# Patient Record
Sex: FEMALE | Race: WHITE | NOT HISPANIC OR LATINO | Employment: OTHER | URBAN - METROPOLITAN AREA
[De-identification: names, ages, dates, MRNs, and addresses within clinical notes are randomized per-mention and may not be internally consistent; named-entity substitution may affect disease eponyms.]

---

## 2017-01-26 RX ORDER — MELOXICAM 15 MG/1
TABLET ORAL
Refills: 0 | OUTPATIENT
Start: 2017-01-26

## 2017-03-19 RX ORDER — MELOXICAM 15 MG/1
TABLET ORAL
Qty: 30 TABLET | Refills: 0 | OUTPATIENT
Start: 2017-03-19

## 2017-03-20 DIAGNOSIS — M17.11 PRIMARY OSTEOARTHRITIS OF RIGHT KNEE: Primary | ICD-10-CM

## 2017-03-20 RX ORDER — MELOXICAM 15 MG/1
15 TABLET ORAL DAILY
Qty: 30 TABLET | Refills: 3 | Status: SHIPPED | OUTPATIENT
Start: 2017-03-20 | End: 2017-05-01

## 2017-05-01 ENCOUNTER — PATIENT MESSAGE (OUTPATIENT)
Dept: ORTHOPEDICS | Facility: CLINIC | Age: 72
End: 2017-05-01

## 2017-05-01 RX ORDER — MELOXICAM 15 MG/1
15 TABLET ORAL DAILY
Qty: 30 TABLET | Refills: 2 | Status: SHIPPED | OUTPATIENT
Start: 2017-05-01 | End: 2017-05-31

## 2017-05-16 ENCOUNTER — PATIENT MESSAGE (OUTPATIENT)
Dept: INTERNAL MEDICINE | Facility: CLINIC | Age: 72
End: 2017-05-16

## 2017-06-08 ENCOUNTER — OFFICE VISIT (OUTPATIENT)
Dept: INTERNAL MEDICINE | Facility: CLINIC | Age: 72
End: 2017-06-08
Payer: MEDICARE

## 2017-06-08 VITALS
WEIGHT: 179 LBS | SYSTOLIC BLOOD PRESSURE: 112 MMHG | BODY MASS INDEX: 25.62 KG/M2 | HEIGHT: 70 IN | HEART RATE: 67 BPM | DIASTOLIC BLOOD PRESSURE: 82 MMHG | OXYGEN SATURATION: 97 %

## 2017-06-08 DIAGNOSIS — M17.11 PRIMARY OSTEOARTHRITIS OF RIGHT KNEE: ICD-10-CM

## 2017-06-08 DIAGNOSIS — B35.1 ONYCHOMYCOSIS: Primary | ICD-10-CM

## 2017-06-08 DIAGNOSIS — R20.0 NUMBNESS OF FOOT: ICD-10-CM

## 2017-06-08 PROCEDURE — 1126F AMNT PAIN NOTED NONE PRSNT: CPT | Mod: S$GLB,,, | Performed by: INTERNAL MEDICINE

## 2017-06-08 PROCEDURE — 1159F MED LIST DOCD IN RCRD: CPT | Mod: S$GLB,,, | Performed by: INTERNAL MEDICINE

## 2017-06-08 PROCEDURE — 99214 OFFICE O/P EST MOD 30 MIN: CPT | Mod: S$GLB,,, | Performed by: INTERNAL MEDICINE

## 2017-06-08 PROCEDURE — 99999 PR PBB SHADOW E&M-EST. PATIENT-LVL III: CPT | Mod: PBBFAC,,, | Performed by: INTERNAL MEDICINE

## 2017-06-08 RX ORDER — TERBINAFINE HYDROCHLORIDE 250 MG/1
TABLET ORAL
Qty: 30 TABLET | Refills: 1 | Status: SHIPPED | OUTPATIENT
Start: 2017-06-08 | End: 2017-12-13

## 2017-06-08 NOTE — PROGRESS NOTES
Subjective:       Patient ID: Aurora Castle is a 71 y.o. female.    Chief Complaint: Hyperglycemia (109 glucose) and Nail Problem    HPI   Had wellness check at work.  130/78.  Bone density nl.    Fasting sugar 109 and she was advised to see a doctor immediately.   Her mother had pre-diabetes.    Walking less due to knee pain.  She has beenless careful with diet lately and BMI 25.6.    Slipped on steps and twisted R knee late april.  Since then r ankle swollen and foot numb.  Knee was swollen for a while.  She has had fluid aspirated and synvisc injection in past.  Taking meloxicam.  Knee much improved.  Intends to see ortho later for synvisc.      Also with L thumb oychomycosis.  Had similar infection of toenail and antibiotics helps.  She is a jeweler.  Review of Systems   Constitutional: Negative for fever and unexpected weight change.   HENT: Negative for congestion and postnasal drip.    Eyes: Negative for pain, discharge and visual disturbance.   Respiratory: Negative for cough, chest tightness, shortness of breath and wheezing.    Cardiovascular: Negative for chest pain and leg swelling.   Gastrointestinal: Negative for abdominal pain, constipation, diarrhea and nausea.   Genitourinary: Negative for difficulty urinating, dysuria and hematuria.   Musculoskeletal: Positive for arthralgias and joint swelling. Negative for back pain and gait problem.   Skin: Negative for rash.   Neurological: Positive for numbness. Negative for headaches.   Psychiatric/Behavioral: Negative for dysphoric mood and sleep disturbance. The patient is not nervous/anxious.        Objective:      Physical Exam   Constitutional: She is oriented to person, place, and time. She appears well-developed and well-nourished.   Musculoskeletal: She exhibits edema (minimal , of R ankle).        Right knee: She exhibits effusion (mild). She exhibits normal range of motion, no ecchymosis, no deformity and no erythema.        Right ankle: She  exhibits swelling (minimal). She exhibits no ecchymosis and no deformity. No tenderness.   Neurological: She is alert and oriented to person, place, and time.   Skin: No rash noted. She is not diaphoretic. No erythema.   White discoloration L medial thumbnail.   Psychiatric: She has a normal mood and affect. Her behavior is normal.       Assessment:       1. Onychomycosis    2. Primary osteoarthritis of right knee    3. Numbness of foot     4.     Isolated prediabetes glucose reading.  Plan:       Aurora was seen today for hyperglycemia and nail problem.    Diagnoses and all orders for this visit:    Onychomycosis  -     AST (SGOT); Future  -     ALT (SGPT); Future    Primary osteoarthritis of right knee    Numbness of foot    Other orders  -     terbinafine HCl (LAMISIL) 250 mg tablet; 1 tab po q day for 6 weeks       Wt loss     rtc PE with labs.     We discussed EMG if foot numbness doesn't resolve       F/u ortho

## 2017-06-22 ENCOUNTER — TELEPHONE (OUTPATIENT)
Dept: INTERNAL MEDICINE | Facility: CLINIC | Age: 72
End: 2017-06-22

## 2017-06-22 DIAGNOSIS — Z12.31 ENCOUNTER FOR SCREENING MAMMOGRAM FOR MALIGNANT NEOPLASM OF BREAST: Primary | ICD-10-CM

## 2017-06-22 NOTE — TELEPHONE ENCOUNTER
----- Message from Liyah Bui sent at 6/22/2017 12:20 PM CDT -----  Contact: pt   Pt is requesting orders for mammogram.    Pt can be reached at [phone].

## 2017-06-28 ENCOUNTER — HOSPITAL ENCOUNTER (OUTPATIENT)
Dept: RADIOLOGY | Facility: HOSPITAL | Age: 72
Discharge: HOME | End: 2017-06-28
Attending: INTERNAL MEDICINE
Payer: MEDICARE

## 2017-06-28 VITALS — WEIGHT: 170 LBS | BODY MASS INDEX: 24.34 KG/M2 | HEIGHT: 70 IN

## 2017-06-28 DIAGNOSIS — Z12.31 ENCOUNTER FOR SCREENING MAMMOGRAM FOR MALIGNANT NEOPLASM OF BREAST: ICD-10-CM

## 2017-06-28 PROCEDURE — 77067 SCR MAMMO BI INCL CAD: CPT | Mod: TC

## 2017-06-28 PROCEDURE — 77067 SCR MAMMO BI INCL CAD: CPT | Mod: 26,,, | Performed by: RADIOLOGY

## 2017-07-07 ENCOUNTER — LAB VISIT (OUTPATIENT)
Dept: LAB | Facility: HOSPITAL | Age: 72
End: 2017-07-07
Attending: INTERNAL MEDICINE
Payer: MEDICARE

## 2017-07-07 DIAGNOSIS — B35.1 ONYCHOMYCOSIS: ICD-10-CM

## 2017-07-07 LAB
ALT SERPL W/O P-5'-P-CCNC: 20 U/L
AST SERPL-CCNC: 20 U/L

## 2017-07-07 PROCEDURE — 84460 ALANINE AMINO (ALT) (SGPT): CPT

## 2017-07-07 PROCEDURE — 84450 TRANSFERASE (AST) (SGOT): CPT

## 2017-07-07 PROCEDURE — 36415 COLL VENOUS BLD VENIPUNCTURE: CPT

## 2017-07-10 RX ORDER — MELOXICAM 15 MG/1
TABLET ORAL
Qty: 90 TABLET | Refills: 2 | Status: SHIPPED | OUTPATIENT
Start: 2017-07-10 | End: 2018-09-06

## 2017-08-17 ENCOUNTER — OFFICE VISIT (OUTPATIENT)
Dept: ORTHOPEDICS | Facility: CLINIC | Age: 72
End: 2017-08-17
Payer: MEDICARE

## 2017-08-17 VITALS
DIASTOLIC BLOOD PRESSURE: 70 MMHG | WEIGHT: 170 LBS | HEIGHT: 70 IN | BODY MASS INDEX: 24.34 KG/M2 | HEART RATE: 69 BPM | SYSTOLIC BLOOD PRESSURE: 139 MMHG

## 2017-08-17 DIAGNOSIS — M17.11 PRIMARY OSTEOARTHRITIS OF RIGHT KNEE: Primary | ICD-10-CM

## 2017-08-17 PROCEDURE — 1159F MED LIST DOCD IN RCRD: CPT | Mod: S$GLB,,, | Performed by: PHYSICIAN ASSISTANT

## 2017-08-17 PROCEDURE — 99213 OFFICE O/P EST LOW 20 MIN: CPT | Mod: 25,S$GLB,, | Performed by: PHYSICIAN ASSISTANT

## 2017-08-17 PROCEDURE — 3008F BODY MASS INDEX DOCD: CPT | Mod: S$GLB,,, | Performed by: PHYSICIAN ASSISTANT

## 2017-08-17 PROCEDURE — 99999 PR PBB SHADOW E&M-EST. PATIENT-LVL III: CPT | Mod: PBBFAC,,, | Performed by: PHYSICIAN ASSISTANT

## 2017-08-17 PROCEDURE — 20610 DRAIN/INJ JOINT/BURSA W/O US: CPT | Mod: RT,S$GLB,, | Performed by: PHYSICIAN ASSISTANT

## 2017-08-17 NOTE — PROGRESS NOTES
Aurora Castle is a 71 y.o. year old her here today for her 1st Synvisc injection for degenerative changes of herright knee . she was last seen and treated in the clinic on 5/27/2016.  She has received an excess of 1 year very good relief from her Synvisc injections is beginning have some soreness and pain in the medial aspect of the right knee and is made phone calls to the clinic to schedule an repeat injections beginning today There has been no significant change in her medical status since her last visit. No Fever, chills, malaise, or unexplained weight change.      Allergies, Medications, past medical and surgical history were reviewed .    Examination of the knee demonstrates  No evidence of edema, erythema , echymosis strength and range of motion are unchanged from previous visit.    The injection site was identified and the skin was prepared with a betadine solution. The right knee joint was injected with 2 ml of Synvisc solution under sterile technique. Aurora Castle tolerated the procedure well, she was advised to rest the knee today, ice and elevation. I may take 3 -6 weeks following the last injection to get the full benefit of the medication.  I will see her back in 1 week. Sooner if he has any problems or concerns.           .

## 2017-08-25 ENCOUNTER — OFFICE VISIT (OUTPATIENT)
Dept: ORTHOPEDICS | Facility: CLINIC | Age: 72
End: 2017-08-25
Payer: MEDICARE

## 2017-08-25 DIAGNOSIS — M17.11 PRIMARY OSTEOARTHRITIS OF RIGHT KNEE: Primary | ICD-10-CM

## 2017-08-25 PROCEDURE — 20610 DRAIN/INJ JOINT/BURSA W/O US: CPT | Mod: RT,S$GLB,, | Performed by: PHYSICIAN ASSISTANT

## 2017-08-25 PROCEDURE — 99499 UNLISTED E&M SERVICE: CPT | Mod: S$GLB,,, | Performed by: PHYSICIAN ASSISTANT

## 2017-08-25 PROCEDURE — 99999 PR PBB SHADOW E&M-EST. PATIENT-LVL II: CPT | Mod: PBBFAC,,, | Performed by: PHYSICIAN ASSISTANT

## 2017-08-25 NOTE — PROGRESS NOTES
Aurora Castle is a 71 y.o. year old her here today for her 2nd Synvisc injection for degenerative changes of her right knee . she was last seen and treated in the clinic on 8/17/2017. There has been no significant change in her medical status since her last visit. No Fever, chills, malaise, or unexplained weight change.      Allergies, Medications, past medical and surgical history were reviewed .    Examination of the knee demonstrates  No evidence of edema, erythema , echymosis strength and range of motion are unchanged from previous visit.    The injection site was identified and the skin was prepared with a betadine solution. The right knee joint was injected with 2 ml of Euflexxa solution under sterile technique. Aurora Castle tolerated the procedure well, she was advised to rest the knee today, ice and elevation. I may take 3 -6 weeks following the last injection to get the full benefit of the medication.  I will see her back in 1 week. Sooner if he has any problems or concerns.           .

## 2017-09-01 ENCOUNTER — OFFICE VISIT (OUTPATIENT)
Dept: ORTHOPEDICS | Facility: CLINIC | Age: 72
End: 2017-09-01
Payer: MEDICARE

## 2017-09-01 VITALS
WEIGHT: 173.38 LBS | HEART RATE: 57 BPM | DIASTOLIC BLOOD PRESSURE: 80 MMHG | BODY MASS INDEX: 24.82 KG/M2 | SYSTOLIC BLOOD PRESSURE: 148 MMHG | HEIGHT: 70 IN

## 2017-09-01 DIAGNOSIS — M17.11 PRIMARY OSTEOARTHRITIS OF RIGHT KNEE: Primary | ICD-10-CM

## 2017-09-01 PROCEDURE — 20610 DRAIN/INJ JOINT/BURSA W/O US: CPT | Mod: RT,S$GLB,, | Performed by: PHYSICIAN ASSISTANT

## 2017-09-01 PROCEDURE — 99499 UNLISTED E&M SERVICE: CPT | Mod: S$GLB,,, | Performed by: PHYSICIAN ASSISTANT

## 2017-09-01 PROCEDURE — 99999 PR PBB SHADOW E&M-EST. PATIENT-LVL III: CPT | Mod: PBBFAC,,, | Performed by: PHYSICIAN ASSISTANT

## 2017-09-01 NOTE — PROGRESS NOTES
Aurora Castle is a 71 y.o. year old her here today for her 3 rd Synvisc injection for degenerative changes of her right knee . she was last seen and treated in the clinic on 8/25/2017. There has been no significant change in her medical status since her last visit. No Fever, chills, malaise, or unexplained weight change.      Allergies, Medications, past medical and surgical history were reviewed .    Examination of the knee demonstrates  No evidence of edema, erythema , echymosis strength and range of motion are unchanged from previous visit.    The injection site was identified and the skin was prepared with a betadine solution. The  right knee  joint was injected with 2 ml of synvisc solution under sterile technique. Aurora Castle tolerated the procedure well, she was advised to rest the knee today, ice and elevation. I may take 3 -6 weeks following the last injection to get the full benefit of the medication.  I will see her back in 3 -6 months. Sooner if he has any problems or concerns.           .

## 2017-10-23 ENCOUNTER — PATIENT MESSAGE (OUTPATIENT)
Dept: INTERNAL MEDICINE | Facility: CLINIC | Age: 72
End: 2017-10-23

## 2017-10-23 DIAGNOSIS — B35.1 ONYCHOMYCOSIS: Primary | ICD-10-CM

## 2017-11-02 ENCOUNTER — INITIAL CONSULT (OUTPATIENT)
Dept: DERMATOLOGY | Facility: CLINIC | Age: 72
End: 2017-11-02
Payer: MEDICARE

## 2017-11-02 DIAGNOSIS — B35.1 ONYCHOMYCOSIS: Primary | ICD-10-CM

## 2017-11-02 PROCEDURE — 99999 PR PBB SHADOW E&M-EST. PATIENT-LVL III: CPT | Mod: PBBFAC,,, | Performed by: NURSE PRACTITIONER

## 2017-11-02 PROCEDURE — 87106 FUNGI IDENTIFICATION YEAST: CPT

## 2017-11-02 PROCEDURE — 87101 SKIN FUNGI CULTURE: CPT

## 2017-11-02 PROCEDURE — 99202 OFFICE O/P NEW SF 15 MIN: CPT | Mod: S$GLB,,, | Performed by: NURSE PRACTITIONER

## 2017-11-02 RX ORDER — NEOMYCIN SULFATE, POLYMYXIN B SULFATE AND HYDROCORTISONE 10; 3.5; 1 MG/ML; MG/ML; [USP'U]/ML
SUSPENSION/ DROPS AURICULAR (OTIC)
Qty: 30 ML | Refills: 3 | Status: SHIPPED | OUTPATIENT
Start: 2017-11-02 | End: 2018-09-06

## 2017-11-02 NOTE — PROGRESS NOTES
Subjective:       Patient ID:  Aurora Castle is a 72 y.o. female who presents for   Chief Complaint   Patient presents with    Nail Problem     left thumb     Nail Problem  - Initial  Affected locations: left fingers  Duration: 4 months  Signs / symptoms: swelling, scaling and rough  Severity: mild to moderate  Timing: constant  Aggravated by: nothing  Relieving factors/Treatments tried: OTC antifungals (Comlpeted Lamisil 250mg PO qday x6 weeks. Completed last month.)  Improvement on treatment: no relief        Review of Systems   Constitutional: Negative for fever, chills, fatigue, night sweats and malaise.   Skin: Positive for daily sunscreen use.   Hematologic/Lymphatic: Does not bruise/bleed easily.        Objective:    Physical Exam   Constitutional: She appears well-developed and well-nourished.   Neurological: She is alert and oriented to person, place, and time.   Psychiatric: She has a normal mood and affect.   Skin:   Areas Examined (abnormalities noted in diagram):   Nails and Digits Inspection Performed             Diagram Legend     Erythematous scaling macule/papule c/w actinic keratosis       Vascular papule c/w angioma      Pigmented verrucoid papule/plaque c/w seborrheic keratosis      Yellow umbilicated papule c/w sebaceous hyperplasia      Irregularly shaped tan macule c/w lentigo     1-2 mm smooth white papules consistent with Milia      Movable subcutaneous cyst with punctum c/w epidermal inclusion cyst      Subcutaneous movable cyst c/w pilar cyst      Firm pink to brown papule c/w dermatofibroma      Pedunculated fleshy papule(s) c/w skin tag(s)      Evenly pigmented macule c/w junctional nevus     Mildly variegated pigmented, slightly irregular-bordered macule c/w mildly atypical nevus      Flesh colored to evenly pigmented papule c/w intradermal nevus       Pink pearly papule/plaque c/w basal cell carcinoma      Erythematous hyperkeratotic cursted plaque c/w SCC      Surgical scar with  no sign of skin cancer recurrence      Open and closed comedones      Inflammatory papules and pustules      Verrucoid papule consistent consistent with wart     Erythematous eczematous patches and plaques     Dystrophic onycholytic nail with subungual debris c/w onychomycosis     Umbilicated papule    Erythematous-base heme-crusted tan verrucoid plaque consistent with inflamed seborrheic keratosis     Erythematous Silvery Scaling Plaque c/w Psoriasis     See annotation      Assessment / Plan:        Onychomycosis- left thumb  -     neomycin-polymyxin-hydrocortisone (CORTISPORIN) 3.5-10,000-1 mg/mL-unit/mL-% otic suspension; 2 drops under affected nail(s) bid  Dispense: 30 mL; Refill: 3  -     Fungal culture , skin, hair, or nails    Recommend trimming nails.Keep your feet clean and dry. Avoid sharing nail tools, such as clippers and scissors. Wear flip-flops or other footwear in a gym shower or locker room    Okay to continue off Lamisil. Will contact patient with results of culture.           Return if symptoms worsen or fail to improve.

## 2017-11-02 NOTE — LETTER
November 2, 2017      Isabelle Vincent MD  [address]  [place] [address]           [facility]  [address]  [place] [address]  Phone: [phone]  Fax: [phone]          Patient: Aurora Castle   MR Number: [id number]   YOB: 1945   Date of Visit: 11/2/2017       Dear Dr. Isabelle Vincent:    Thank you for referring Aurora Castle to me for evaluation. Attached you will find relevant portions of my assessment and plan of care.    If you have questions, please do not hesitate to call me. I look forward to following Aurora Castle along with you.    Sincerely,    Roxie Kramer, NP    Enclosure  CC:  No Recipients    If you would like to receive this communication electronically, please contact [email or web address] or [phone] to request more information on [facility] access.    For providers and/or their staff who would like to refer a patient to [facility], please contact us through our one-stop-shop provider referral line, [facility], at [phone].    If you feel you have received this communication in error or would no longer like to receive these types of communications, please e-mail [email or web address]

## 2017-11-09 ENCOUNTER — PATIENT MESSAGE (OUTPATIENT)
Dept: DERMATOLOGY | Facility: CLINIC | Age: 72
End: 2017-11-09

## 2017-11-09 RX ORDER — CICLOPIROX OLAMINE 7.7 MG/100ML
SUSPENSION TOPICAL 2 TIMES DAILY
Qty: 30 ML | Refills: 2 | Status: SHIPPED | OUTPATIENT
Start: 2017-11-09 | End: 2020-06-02 | Stop reason: CLARIF

## 2017-11-27 ENCOUNTER — PATIENT MESSAGE (OUTPATIENT)
Dept: INTERNAL MEDICINE | Facility: CLINIC | Age: 72
End: 2017-11-27

## 2017-12-06 LAB — FUNGUS BLD CULT: NORMAL

## 2017-12-13 ENCOUNTER — OFFICE VISIT (OUTPATIENT)
Dept: INTERNAL MEDICINE | Facility: CLINIC | Age: 72
End: 2017-12-13
Payer: MEDICARE

## 2017-12-13 VITALS
HEIGHT: 70 IN | BODY MASS INDEX: 25.62 KG/M2 | DIASTOLIC BLOOD PRESSURE: 86 MMHG | HEART RATE: 60 BPM | SYSTOLIC BLOOD PRESSURE: 136 MMHG | WEIGHT: 179 LBS

## 2017-12-13 DIAGNOSIS — G47.00 INSOMNIA, UNSPECIFIED TYPE: ICD-10-CM

## 2017-12-13 DIAGNOSIS — Z00.00 ENCOUNTER FOR PREVENTIVE HEALTH EXAMINATION: Primary | ICD-10-CM

## 2017-12-13 DIAGNOSIS — H91.92 HEARING DECREASED, LEFT: ICD-10-CM

## 2017-12-13 DIAGNOSIS — M17.11 PRIMARY OSTEOARTHRITIS OF RIGHT KNEE: ICD-10-CM

## 2017-12-13 DIAGNOSIS — E55.9 MILD VITAMIN D DEFICIENCY: ICD-10-CM

## 2017-12-13 DIAGNOSIS — Z86.010 HISTORY OF ADENOMATOUS POLYP OF COLON: ICD-10-CM

## 2017-12-13 PROCEDURE — G0439 PPPS, SUBSEQ VISIT: HCPCS | Mod: S$GLB,,, | Performed by: NURSE PRACTITIONER

## 2017-12-13 PROCEDURE — 99999 PR PBB SHADOW E&M-EST. PATIENT-LVL V: CPT | Mod: PBBFAC,,, | Performed by: NURSE PRACTITIONER

## 2017-12-13 NOTE — PROGRESS NOTES
"Aurora Castle presented for a  Medicare AWV and comprehensive Health Risk Assessment today. The following components were reviewed and updated:    · Medical history  · Family History  · Social history  · Allergies and Current Medications  · Health Risk Assessment  · Health Maintenance  · Care Team     ** See Completed Assessments for Annual Wellness Visit within the encounter summary.**       The following assessments were completed:  · Living Situation  · CAGE  · Depression Screening  · Timed Get Up and Go  · Whisper Test  · Cognitive Function Screening  · Nutrition Screening  · ADL Screening  · PAQ Screening    Vitals:    12/13/17 0913   BP: 136/86   Pulse: 60   Weight: 81.2 kg (179 lb 0.2 oz)   Height: 5' 10" (1.778 m)     Body mass index is 25.69 kg/m².  Physical Exam   Constitutional: She is oriented to person, place, and time. She appears well-developed and well-nourished.   HENT:   Head: Normocephalic and atraumatic.   Nose: Nose normal.   Eyes: Conjunctivae and EOM are normal.   Cardiovascular: Normal rate, regular rhythm, normal heart sounds and intact distal pulses.    No murmur heard.  Pulmonary/Chest: Effort normal and breath sounds normal.   Musculoskeletal: Normal range of motion.   Neurological: She is alert and oriented to person, place, and time.   Skin: Skin is warm and dry.   Psychiatric: She has a normal mood and affect. Her behavior is normal. Judgment and thought content normal.   Nursing note and vitals reviewed.        Diagnoses and health risks identified today and associated recommendations/orders:    1. Encounter for preventive health examination  Assessment performed. Health maintenance updated. Chart review completed.    2. Hearing decreased, left  - Ambulatory Referral to Audiology    3. Mild vitamin D deficiency  Stable with supplement. Followed by PCP.    4. History of adenomatous polyp of colon  Stable. Colonoscopy due 2018. Followed by PCP.    5. Primary osteoarthritis of right " knee  S/P knee injections. Still has some pain. Followed by Orthopedics.    6. Insomnia, unspecified type  Chronic. Stable with current regimen. Followed by PCP.      Provided Aurora with a 5-10 year written screening schedule and personal prevention plan. Recommendations were developed using the USPSTF age appropriate recommendations. Education, counseling, and referrals were provided as needed. After Visit Summary printed and given to patient which includes a list of additional screenings\tests needed.    Return for follow up with Primary Care Provider as instructed, ;sooner if problems, HRA in 1 year.    TAJ Williamson

## 2017-12-13 NOTE — PATIENT INSTRUCTIONS
Counseling and Referral of Other Preventative  (Italic type indicates deductible and co-insurance are waived)    Patient Name: Aurora Castle  Today's Date: 12/13/2017      SERVICE LIMITATIONS RECOMMENDATION    Vaccines    · Pneumococcal (once after 65)    · Influenza (annually)    · Hepatitis B (if medium/high risk)    · Prevnar 13      Hepatitis B medium/high risk factors:       - End-stage renal disease       - Hemophiliacs who received Factor VII or         IX concentrates       - Clients of institutions for the mentally             retarded       - Persons who live in the same house as          a HepB carrier       - Homosexual men       - Illicit injectable drug abusers     Pneumococcal: Done, no repeat necessary     Influenza: Done, repeat in one year     Hepatitis B: N/A     Prevnar 13: Done, no repeat necessary    Mammogram (biennial age 50-74)  Annually (age 40 or over)  Done this year, repeat every year    Pap (up to age 70 and after 70 if unknown history or abnormal study last 10 years)    N/A     The USPSTF recommends against screening for cervical cancer in women older than age 65 years who have had adequate prior screening and are not otherwise at high risk for cervical cancer.      Colorectal cancer screening (to age 75)    · Fecal occult blood test (annual)  · Flexible sigmoidoscopy (5y)  · Screening colonoscopy (10y)  · Barium enema   Last done 3/25/2013, recommend to repeat every 5  years    Diabetes self-management training (no USPSTF recommendations)  Requires referral by treating physician for patient with diabetes or renal disease. 10 hours of initial DSMT sessions of no less than 30 minutes each in a continuous 12-month period. 2 hours of follow-up DSMT in subsequent years.  N/A    Bone mass measurements (age 65 & older, biennial)  Requires diagnosis related to osteoporosis or estrogen deficiency. Biennial benefit unless patient has history of long-term glucocorticoid  Last done 5/23/2014,  recommend to repeat every in 4 years    Glaucoma screening (no USPSTF recommendation)  Diabetes mellitus, family history   , age 50 or over    American, age 65 or over  Done this year, repeat every year    Medical nutrition therapy for diabetes or renal disease (no recommended schedule)  Requires referral by treating physician for patient with diabetes or renal disease or kidney transplant within the past 3 years.  Can be provided in same year as diabetes self-management training (DSMT), and CMS recommends medical nutrition therapy take place after DSMT. Up to 3 hours for initial year and 2 hours in subsequent years.  N/A    Cardiovascular screening blood tests (every 5 years)  · Fasting lipid panel  Order as a panel if possible  Done this year, repeat every year    Diabetes screening tests (at least every 3 years, Medicare covers annually or at 6-month intervals for prediabetic patients)  · Fasting blood sugar (FBS) or glucose tolerance test (GTT)  Patient must be diagnosed with one of the following:       - Hypertension       - Dyslipidemia       - Obesity (BMI 30kg/m2)       - Previous elevated impaired FBS or GTT       ... or any two of the following:       - Overweight (BMI 25 but <30)       - Family history of diabetes       - Age 65 or older       - History of gestational diabetes or birth of baby weighing more than 9 pounds  Done this year, repeat every year    HIV screening (annually for increased risk patients)  · HIV-1 and HIV-2 by EIA, or JOE, rapid antibody test or oral mucosa transudate  Patients must be at increased risk for HIV infection per USPSTF guidelines or pregnant. Tests covered annually for patient at increased risk or as requested by the patient. Pregnant patients may receive up to 3 tests during pregnancy.  Risks discussed, screening is not recommended    Smoking cessation counseling (up to 8 sessions per year)  Patients must be asymptomatic of tobacco-related  conditions to receive as a preventative service.  Non-smoker    Subsequent annual wellness visit  At least 12 months since last AWV  Return in one year     The following information is provided to all patients.  This information is to help you find resources for any of the problems found today that may be affecting your health:                Living healthy guide: www.[facility].[place].[facility]      Understanding Diabetes: www.diabetes.org      Eating healthy: www.cdc.gov/healthyweight      CDC home safety checklist: www.cdc.gov/steadi/patient.html      Agency on Aging: www.goea.[place].[facility]      Alcoholics anonymous (AA): www.aa.org      Physical Activity: www.jim.nih.gov/vm3zuau      Tobacco use: www.quitwithusla.org

## 2018-01-24 ENCOUNTER — CLINICAL SUPPORT (OUTPATIENT)
Dept: AUDIOLOGY | Facility: CLINIC | Age: 73
End: 2018-01-24
Payer: MEDICARE

## 2018-01-24 DIAGNOSIS — H90.3 SENSORINEURAL HEARING LOSS, BILATERAL: Primary | ICD-10-CM

## 2018-01-24 PROCEDURE — 92550 TYMPANOMETRY & REFLEX THRESH: CPT | Mod: S$GLB,,, | Performed by: AUDIOLOGIST

## 2018-01-24 PROCEDURE — 92557 COMPREHENSIVE HEARING TEST: CPT | Mod: S$GLB,,, | Performed by: AUDIOLOGIST

## 2018-01-29 ENCOUNTER — OFFICE VISIT (OUTPATIENT)
Dept: URGENT CARE | Facility: CLINIC | Age: 73
End: 2018-01-29
Payer: MEDICARE

## 2018-01-29 VITALS
RESPIRATION RATE: 16 BRPM | WEIGHT: 179 LBS | HEART RATE: 62 BPM | OXYGEN SATURATION: 96 % | BODY MASS INDEX: 25.62 KG/M2 | SYSTOLIC BLOOD PRESSURE: 116 MMHG | DIASTOLIC BLOOD PRESSURE: 76 MMHG | TEMPERATURE: 99 F | HEIGHT: 70 IN

## 2018-01-29 DIAGNOSIS — J10.1 INFLUENZA A: Primary | ICD-10-CM

## 2018-01-29 DIAGNOSIS — R05.9 COUGH: ICD-10-CM

## 2018-01-29 LAB
CTP QC/QA: YES
FLUAV AG NPH QL: POSITIVE
FLUBV AG NPH QL: NEGATIVE

## 2018-01-29 PROCEDURE — 87804 INFLUENZA ASSAY W/OPTIC: CPT | Mod: 59,QW,S$GLB, | Performed by: NURSE PRACTITIONER

## 2018-01-29 PROCEDURE — 99214 OFFICE O/P EST MOD 30 MIN: CPT | Mod: S$GLB,,, | Performed by: NURSE PRACTITIONER

## 2018-01-29 RX ORDER — BENZONATATE 200 MG/1
200 CAPSULE ORAL 3 TIMES DAILY PRN
Qty: 30 CAPSULE | Refills: 0 | Status: SHIPPED | OUTPATIENT
Start: 2018-01-29 | End: 2019-01-29

## 2018-01-29 RX ORDER — OSELTAMIVIR PHOSPHATE 75 MG/1
75 CAPSULE ORAL 2 TIMES DAILY
Qty: 10 CAPSULE | Refills: 0 | Status: SHIPPED | OUTPATIENT
Start: 2018-01-29 | End: 2018-02-03

## 2018-01-29 NOTE — PATIENT INSTRUCTIONS
Influenza (Adult)    Influenza is also called the flu. It is a viral illness that affects the air passages of your lungs. It is different from the common cold. The flu can easily be passed from one to person to another. It may be spread through the air by coughing and sneezing. Or it can be spread by touching the sick person and then touching your own eyes, nose, or mouth.  The flu starts 1 to 3 days after you are exposed to the flu virus. It may last for 1 to 2 weeks but many people feel tired or fatigued for many weeks afterward. You usually dont need to take antibiotics unless you have a complication. This might be an ear or sinus infection or pneumonia.  Symptoms of the flu may be mild or severe. They can include extreme tiredness (wanting to stay in bed all day), chills, fevers, muscle aches, soreness with eye movement, headache, and a dry, hacking cough.  Home care  Follow these guidelines when caring for yourself at home:  · Avoid being around cigarette smoke, whether yours or other peoples.  · Acetaminophen or ibuprofen will help ease your fever, muscle aches, and headache. Dont give aspirin to anyone younger than 18 who has the flu. Aspirin can harm the liver.  · Nausea and loss of appetite are common with the flu. Eat light meals. Drink 6 to 8 glasses of liquids every day. Good choices are water, sport drinks, soft drinks without caffeine, juices, tea, and soup. Extra fluids will also help loosen secretions in your nose and lungs.  · Over-the-counter cold medicines will not make the flu go away faster. But the medicines may help with coughing, sore throat, and congestion in your nose and sinuses. Dont use a decongestant if you have high blood pressure.  · Stay home until your fever has been gone for at least 24 hours without using medicine to reduce fever.  Follow-up care  Follow up with your healthcare provider, or as advised, if you are not getting better over the next week.  If you are age 65 or  older, talk with your provider about getting a pneumococcal vaccine every 5 years. You should also get this vaccine if you have chronic asthma or COPD. All adults should get a flu vaccine every fall. Ask your provider about this.  When to seek medical advice  Call your healthcare provider right away if any of these occur:  · Cough with lots of colored mucus (sputum) or blood in your mucus  · Chest pain, shortness of breath, wheezing, or trouble breathing  · Severe headache, or face, neck, or ear pain  · New rash with fever  · Fever of 100.4°F (38°C) or higher, or as directed by your healthcare provider  · Confusion, behavior change, or seizure  · Severe weakness or dizziness  · You get a new fever or cough after getting better for a few days  Date Last Reviewed: 1/1/2017  © 0054-8239 [employer]. [address] [place] [address]. All rights reserved. This information is not intended as a substitute for professional medical care. Always follow your healthcare professional's instructions.    Please arrange follow up with your primary medical clinic as soon as possible. You must understand that you've received an Urgent Care treatment only and that you may be released before all of your medical problems are known or treated. You, the patient, will arrange for follow up as instructed. If your symptoms worsen or fail to improve you should go to the Emergency Room.

## 2018-01-29 NOTE — PROGRESS NOTES
Subjective:       Patient ID: Aurora Castle is a 72 y.o. female.    Vitals:    01/29/18 0940   BP: 116/76   Pulse: 62   Resp: 16   Temp: 98.9 °F (37.2 °C)       Chief Complaint: Cough    Pt presents to clinic with c/o cough, fever and sore throat x2 days. + exposure to pneumonia in the home.       Cough   This is a new problem. The current episode started yesterday (1 week). The problem has been gradually worsening. The problem occurs every few minutes. The cough is non-productive. Associated symptoms include a fever (at home yesterday 101), headaches, postnasal drip, rhinorrhea and a sore throat. Pertinent negatives include no chest pain, chills, ear congestion, ear pain, eye redness, myalgias, nasal congestion, shortness of breath or wheezing. The symptoms are aggravated by lying down. She has tried OTC cough suppressant for the symptoms. The treatment provided no relief. There is no history of asthma or bronchitis.     Review of Systems   Constitution: Positive for fever (at home yesterday 101). Negative for chills and malaise/fatigue.   HENT: Positive for hoarse voice, postnasal drip, rhinorrhea and sore throat. Negative for congestion and ear pain.    Eyes: Negative for discharge and redness.   Cardiovascular: Negative for chest pain, dyspnea on exertion and leg swelling.   Respiratory: Positive for cough (Dry). Negative for shortness of breath, sputum production and wheezing.    Musculoskeletal: Negative for myalgias.   Gastrointestinal: Negative for abdominal pain and nausea.   Neurological: Positive for headaches.   All other systems reviewed and are negative.      Objective:      Physical Exam   Constitutional: She is oriented to person, place, and time. She appears well-developed and well-nourished. She is cooperative.  Non-toxic appearance. She does not appear ill. No distress.   HENT:   Head: Normocephalic and atraumatic.   Right Ear: Hearing, tympanic membrane, external ear and ear canal normal.    Left Ear: Hearing, tympanic membrane, external ear and ear canal normal.   Nose: Rhinorrhea present. No mucosal edema or nasal deformity. No epistaxis. Right sinus exhibits no maxillary sinus tenderness and no frontal sinus tenderness. Left sinus exhibits no maxillary sinus tenderness and no frontal sinus tenderness.   Mouth/Throat: Uvula is midline and mucous membranes are normal. No trismus in the jaw. Normal dentition. No uvula swelling. Posterior oropharyngeal erythema present. No oropharyngeal exudate, posterior oropharyngeal edema or tonsillar abscesses. Tonsils are 0 on the right. Tonsils are 0 on the left. No tonsillar exudate.   Eyes: Conjunctivae and lids are normal. No scleral icterus.   Sclera clear bilat   Neck: Trachea normal, full passive range of motion without pain and phonation normal. Neck supple.   Cardiovascular: Normal rate, regular rhythm, normal heart sounds, intact distal pulses and normal pulses.    Pulmonary/Chest: Effort normal and breath sounds normal. No respiratory distress.   Abdominal: Soft. Normal appearance and bowel sounds are normal. She exhibits no distension. There is no tenderness.   Musculoskeletal: Normal range of motion. She exhibits no edema or deformity.   Lymphadenopathy:        Head (right side): No submental, no submandibular, no tonsillar, no preauricular and no posterior auricular adenopathy present.        Head (left side): No submental, no submandibular, no tonsillar, no preauricular and no posterior auricular adenopathy present.   Neurological: She is alert and oriented to person, place, and time. She exhibits normal muscle tone. Coordination normal.   Skin: Skin is warm, dry and intact. She is not diaphoretic. No pallor.   Psychiatric: She has a normal mood and affect. Her speech is normal and behavior is normal. Judgment and thought content normal. Cognition and memory are normal.   Nursing note and vitals reviewed.      Results for orders placed or performed  in visit on 01/29/18   POCT Influenza A/B   Result Value Ref Range    Rapid Influenza A Ag Positive (A) Negative    Rapid Influenza B Ag Negative Negative     Acceptable Yes          Assessment:       1. Influenza A    2. Cough        Plan:       Aurora was seen today for cough.    Diagnoses and all orders for this visit:    Influenza A    Cough  -     POCT Influenza A/B    Other orders  -     oseltamivir (TAMIFLU) 75 MG capsule; Take 1 capsule (75 mg total) by mouth 2 (two) times daily.  -     benzonatate (TESSALON) 200 MG capsule; Take 1 capsule (200 mg total) by mouth 3 (three) times daily as needed.

## 2018-03-22 ENCOUNTER — TELEPHONE (OUTPATIENT)
Dept: INTERNAL MEDICINE | Facility: CLINIC | Age: 73
End: 2018-03-22

## 2018-03-22 DIAGNOSIS — Z00.00 ANNUAL PHYSICAL EXAM: Primary | ICD-10-CM

## 2018-03-22 DIAGNOSIS — Z13.6 SCREENING FOR CARDIOVASCULAR CONDITION: ICD-10-CM

## 2018-03-22 NOTE — TELEPHONE ENCOUNTER
----- Message from Maryam Hilton sent at 3/22/2018  2:10 PM CDT -----  Lab Orders Needed    I have scheduled the above patients annual physical. Lab orders need to be placed and scheduled.    Date of Annual Physical: 05/02/2018    Thank You

## 2018-05-02 ENCOUNTER — LAB VISIT (OUTPATIENT)
Dept: LAB | Facility: HOSPITAL | Age: 73
End: 2018-05-02
Attending: INTERNAL MEDICINE
Payer: MEDICARE

## 2018-05-02 ENCOUNTER — OFFICE VISIT (OUTPATIENT)
Dept: INTERNAL MEDICINE | Facility: CLINIC | Age: 73
End: 2018-05-02
Payer: MEDICARE

## 2018-05-02 VITALS
HEIGHT: 70 IN | SYSTOLIC BLOOD PRESSURE: 140 MMHG | OXYGEN SATURATION: 95 % | HEART RATE: 55 BPM | BODY MASS INDEX: 25.41 KG/M2 | DIASTOLIC BLOOD PRESSURE: 78 MMHG | WEIGHT: 177.5 LBS

## 2018-05-02 DIAGNOSIS — Z86.010 PERSONAL HISTORY OF COLONIC POLYPS: ICD-10-CM

## 2018-05-02 DIAGNOSIS — N95.9 MENOPAUSAL DISORDER: ICD-10-CM

## 2018-05-02 DIAGNOSIS — Z00.00 ANNUAL PHYSICAL EXAM: ICD-10-CM

## 2018-05-02 DIAGNOSIS — Z12.31 ENCOUNTER FOR SCREENING MAMMOGRAM FOR MALIGNANT NEOPLASM OF BREAST: ICD-10-CM

## 2018-05-02 DIAGNOSIS — M17.11 PRIMARY OSTEOARTHRITIS OF RIGHT KNEE: ICD-10-CM

## 2018-05-02 DIAGNOSIS — Z86.010 HISTORY OF ADENOMATOUS POLYP OF COLON: ICD-10-CM

## 2018-05-02 DIAGNOSIS — Z12.11 COLON CANCER SCREENING: Primary | ICD-10-CM

## 2018-05-02 LAB
ALBUMIN SERPL BCP-MCNC: 4 G/DL
ALP SERPL-CCNC: 61 U/L
ALT SERPL W/O P-5'-P-CCNC: 19 U/L
ANION GAP SERPL CALC-SCNC: 9 MMOL/L
AST SERPL-CCNC: 22 U/L
BASOPHILS # BLD AUTO: 0.03 K/UL
BASOPHILS NFR BLD: 0.6 %
BILIRUB SERPL-MCNC: 0.6 MG/DL
BUN SERPL-MCNC: 19 MG/DL
CALCIUM SERPL-MCNC: 9.3 MG/DL
CHLORIDE SERPL-SCNC: 108 MMOL/L
CO2 SERPL-SCNC: 24 MMOL/L
CREAT SERPL-MCNC: 0.7 MG/DL
DIFFERENTIAL METHOD: ABNORMAL
EOSINOPHIL # BLD AUTO: 0.2 K/UL
EOSINOPHIL NFR BLD: 4 %
ERYTHROCYTE [DISTWIDTH] IN BLOOD BY AUTOMATED COUNT: 13.9 %
EST. GFR  (AFRICAN AMERICAN): >60 ML/MIN/1.73 M^2
EST. GFR  (NON AFRICAN AMERICAN): >60 ML/MIN/1.73 M^2
GLUCOSE SERPL-MCNC: 96 MG/DL
HCT VFR BLD AUTO: 38.7 %
HGB BLD-MCNC: 12.7 G/DL
LYMPHOCYTES # BLD AUTO: 1.4 K/UL
LYMPHOCYTES NFR BLD: 29.3 %
MCH RBC QN AUTO: 31.8 PG
MCHC RBC AUTO-ENTMCNC: 32.8 G/DL
MCV RBC AUTO: 97 FL
MONOCYTES # BLD AUTO: 0.4 K/UL
MONOCYTES NFR BLD: 8.2 %
NEUTROPHILS # BLD AUTO: 2.8 K/UL
NEUTROPHILS NFR BLD: 57.7 %
PLATELET # BLD AUTO: 196 K/UL
PMV BLD AUTO: 10.5 FL
POTASSIUM SERPL-SCNC: 4.3 MMOL/L
PROT SERPL-MCNC: 6.6 G/DL
RBC # BLD AUTO: 3.99 M/UL
SODIUM SERPL-SCNC: 141 MMOL/L
WBC # BLD AUTO: 4.78 K/UL

## 2018-05-02 PROCEDURE — 36415 COLL VENOUS BLD VENIPUNCTURE: CPT

## 2018-05-02 PROCEDURE — 85025 COMPLETE CBC W/AUTO DIFF WBC: CPT

## 2018-05-02 PROCEDURE — 80053 COMPREHEN METABOLIC PANEL: CPT

## 2018-05-02 PROCEDURE — 99397 PER PM REEVAL EST PAT 65+ YR: CPT | Mod: S$GLB,,, | Performed by: INTERNAL MEDICINE

## 2018-05-02 PROCEDURE — 99999 PR PBB SHADOW E&M-EST. PATIENT-LVL IV: CPT | Mod: PBBFAC,,, | Performed by: INTERNAL MEDICINE

## 2018-05-02 RX ORDER — NAPROXEN SODIUM 220 MG
220 TABLET ORAL 2 TIMES DAILY WITH MEALS
COMMUNITY
End: 2019-06-18

## 2018-05-02 NOTE — PROGRESS NOTES
Subjective:       Patient ID: Aurora Castle is a 72 y.o. female.    Chief Complaint: Annual Exam and Hip Pain (both)    HPI   Continued knee pain due to degenerative disease.  Has seen ortho.  Rides her bike a lot.  Painful hips.  At 5 am has to lie on back.  Sometimes keeps her awake.  No back pain.  Takes aleve or meloxicam.      No h/o htn.    Review of Systems   Constitutional: Negative for activity change and unexpected weight change.   HENT: Negative for hearing loss, rhinorrhea and trouble swallowing.    Eyes: Negative for discharge and visual disturbance.   Respiratory: Negative for chest tightness and wheezing.    Cardiovascular: Negative for chest pain and palpitations.   Gastrointestinal: Negative for blood in stool, constipation, diarrhea and vomiting.   Endocrine: Negative for polydipsia and polyuria.   Genitourinary: Negative for difficulty urinating, dysuria, hematuria and menstrual problem.   Musculoskeletal: Positive for arthralgias. Negative for joint swelling and neck pain.   Neurological: Negative for weakness and headaches.   Psychiatric/Behavioral: Negative for confusion and dysphoric mood.       Objective:      Physical Exam   Constitutional: She is oriented to person, place, and time. She appears well-developed and well-nourished. No distress.   HENT:   Head: Normocephalic and atraumatic.   Right Ear: External ear normal.   Left Ear: External ear normal.   Nose: Nose normal.   Mouth/Throat: Oropharynx is clear and moist.   Eyes: Conjunctivae and EOM are normal. Pupils are equal, round, and reactive to light. Right eye exhibits no discharge. Left eye exhibits no discharge. No scleral icterus.   Neck: Normal range of motion. Neck supple. No JVD present. No thyromegaly present.   Cardiovascular: Normal rate, regular rhythm and normal heart sounds.  Exam reveals no gallop.    No murmur heard.  Pulmonary/Chest: Effort normal and breath sounds normal. No respiratory distress. She has no  wheezes. She has no rales.   Abdominal: Soft. Bowel sounds are normal. She exhibits no distension and no mass. There is no tenderness. There is no rebound and no guarding.   Genitourinary: No breast tenderness, discharge or bleeding.   Musculoskeletal: Normal range of motion. She exhibits no edema or tenderness.   Lymphadenopathy:     She has no cervical adenopathy.   Neurological: She is alert and oriented to person, place, and time. No cranial nerve deficit. Coordination normal.   Skin: Skin is warm and dry. No rash noted.   Psychiatric: She has a normal mood and affect. Her behavior is normal. Judgment and thought content normal.      146/82  Assessment:       1. Colon cancer screening    2. Personal history of colonic polyps    3. Encounter for screening mammogram for malignant neoplasm of breast    4. Menopausal disorder    5. Primary osteoarthritis of right knee    6. History of adenomatous polyp of colon        Plan:       Aurora was seen today for annual exam and hip pain.    Diagnoses and all orders for this visit:    Colon cancer screening  -     Case request GI: COLONOSCOPY    Personal history of colonic polyps  -     Case request GI: COLONOSCOPY    Encounter for screening mammogram for malignant neoplasm of breast  -     Mammo Digital Screening Bilat with Tomosynthesis CAD; Future    Menopausal disorder  -     DXA Bone Density Spine And Hip; Future    Primary osteoarthritis of right knee    History of adenomatous polyp of colon       Monitor BP

## 2018-05-07 DIAGNOSIS — Z12.11 ENCOUNTER FOR COLONOSCOPY DUE TO HISTORY OF COLONIC POLYP: Primary | ICD-10-CM

## 2018-05-07 DIAGNOSIS — Z86.010 ENCOUNTER FOR COLONOSCOPY DUE TO HISTORY OF COLONIC POLYP: Primary | ICD-10-CM

## 2018-05-07 RX ORDER — POLYETHYLENE GLYCOL 3350, SODIUM SULFATE ANHYDROUS, SODIUM BICARBONATE, SODIUM CHLORIDE, POTASSIUM CHLORIDE 236; 22.74; 6.74; 5.86; 2.97 G/4L; G/4L; G/4L; G/4L; G/4L
4 POWDER, FOR SOLUTION ORAL ONCE
Qty: 4000 ML | Refills: 0 | Status: SHIPPED | OUTPATIENT
Start: 2018-05-07 | End: 2018-05-07

## 2018-05-10 ENCOUNTER — PATIENT MESSAGE (OUTPATIENT)
Dept: ADMINISTRATIVE | Facility: OTHER | Age: 73
End: 2018-05-10

## 2018-06-01 ENCOUNTER — SURGERY (OUTPATIENT)
Age: 73
End: 2018-06-01

## 2018-06-01 ENCOUNTER — ANESTHESIA (OUTPATIENT)
Dept: ENDOSCOPY | Facility: HOSPITAL | Age: 73
End: 2018-06-01
Payer: MEDICARE

## 2018-06-01 ENCOUNTER — ANESTHESIA EVENT (OUTPATIENT)
Dept: ENDOSCOPY | Facility: HOSPITAL | Age: 73
End: 2018-06-01
Payer: MEDICARE

## 2018-06-01 ENCOUNTER — HOSPITAL ENCOUNTER (OUTPATIENT)
Facility: HOSPITAL | Age: 73
Discharge: HOME | End: 2018-06-01
Attending: INTERNAL MEDICINE | Admitting: INTERNAL MEDICINE
Payer: MEDICARE

## 2018-06-01 VITALS
HEART RATE: 54 BPM | DIASTOLIC BLOOD PRESSURE: 67 MMHG | TEMPERATURE: 98 F | OXYGEN SATURATION: 100 % | SYSTOLIC BLOOD PRESSURE: 162 MMHG | HEIGHT: 70 IN | BODY MASS INDEX: 24.34 KG/M2 | RESPIRATION RATE: 18 BRPM | WEIGHT: 170 LBS

## 2018-06-01 DIAGNOSIS — Z12.11 COLON CANCER SCREENING: Primary | ICD-10-CM

## 2018-06-01 DIAGNOSIS — Z12.11 SCREENING FOR COLON CANCER: ICD-10-CM

## 2018-06-01 PROCEDURE — 37000009 HC ANESTHESIA EA ADD 15 MINS: Performed by: INTERNAL MEDICINE

## 2018-06-01 PROCEDURE — G0105 COLORECTAL SCRN; HI RISK IND: HCPCS | Mod: GC,,, | Performed by: INTERNAL MEDICINE

## 2018-06-01 PROCEDURE — 25000003 PHARM REV CODE 250: Performed by: NURSE ANESTHETIST, CERTIFIED REGISTERED

## 2018-06-01 PROCEDURE — E9220 PRA ENDO ANESTHESIA: HCPCS | Mod: ,,, | Performed by: NURSE ANESTHETIST, CERTIFIED REGISTERED

## 2018-06-01 PROCEDURE — 37000008 HC ANESTHESIA 1ST 15 MINUTES: Performed by: INTERNAL MEDICINE

## 2018-06-01 PROCEDURE — 25000003 PHARM REV CODE 250: Performed by: INTERNAL MEDICINE

## 2018-06-01 PROCEDURE — G0105 COLORECTAL SCRN; HI RISK IND: HCPCS | Performed by: INTERNAL MEDICINE

## 2018-06-01 PROCEDURE — 63600175 PHARM REV CODE 636 W HCPCS: Performed by: NURSE ANESTHETIST, CERTIFIED REGISTERED

## 2018-06-01 RX ORDER — PROPOFOL 10 MG/ML
VIAL (ML) INTRAVENOUS CONTINUOUS PRN
Status: DISCONTINUED | OUTPATIENT
Start: 2018-06-01 | End: 2018-06-01

## 2018-06-01 RX ORDER — GLYCOPYRROLATE 0.2 MG/ML
INJECTION INTRAMUSCULAR; INTRAVENOUS
Status: DISCONTINUED | OUTPATIENT
Start: 2018-06-01 | End: 2018-06-01

## 2018-06-01 RX ORDER — SODIUM CHLORIDE 9 MG/ML
INJECTION, SOLUTION INTRAVENOUS CONTINUOUS
Status: DISCONTINUED | OUTPATIENT
Start: 2018-06-01 | End: 2018-06-01 | Stop reason: HOSPADM

## 2018-06-01 RX ORDER — LIDOCAINE HCL/PF 100 MG/5ML
SYRINGE (ML) INTRAVENOUS
Status: DISCONTINUED | OUTPATIENT
Start: 2018-06-01 | End: 2018-06-01

## 2018-06-01 RX ORDER — PROPOFOL 10 MG/ML
VIAL (ML) INTRAVENOUS
Status: DISCONTINUED | OUTPATIENT
Start: 2018-06-01 | End: 2018-06-01

## 2018-06-01 RX ORDER — LIDOCAINE HYDROCHLORIDE 10 MG/ML
1 INJECTION INFILTRATION; PERINEURAL ONCE
Status: DISCONTINUED | OUTPATIENT
Start: 2018-06-01 | End: 2018-06-01 | Stop reason: HOSPADM

## 2018-06-01 RX ADMIN — LIDOCAINE HYDROCHLORIDE 50 MG: 20 INJECTION, SOLUTION INTRAVENOUS at 09:06

## 2018-06-01 RX ADMIN — PROPOFOL 70 MG: 10 INJECTION, EMULSION INTRAVENOUS at 09:06

## 2018-06-01 RX ADMIN — SODIUM CHLORIDE: 9 INJECTION, SOLUTION INTRAVENOUS at 09:06

## 2018-06-01 RX ADMIN — GLYCOPYRROLATE 0.2 MG: 0.2 INJECTION, SOLUTION INTRAMUSCULAR; INTRAVENOUS at 09:06

## 2018-06-01 RX ADMIN — PROPOFOL 150 MCG/KG/MIN: 10 INJECTION, EMULSION INTRAVENOUS at 09:06

## 2018-06-01 RX ADMIN — SODIUM CHLORIDE: 9 INJECTION, SOLUTION INTRAVENOUS at 08:06

## 2018-06-01 NOTE — DISCHARGE INSTRUCTIONS
Colonoscopy     A camera attached to a flexible tube with a viewing lens is used to take video pictures.     Colonoscopy is a test to view the inside of your lower digestive tract (colon and rectum). Sometimes it can show the last part of the small intestine (ileum). During the test, small pieces of tissue may be removed for testing. This is called a biopsy. Small growths, such as polyps, may also be removed.   Why is colonoscopy done?  The test is done to help look for colon cancer. And it can help find the source of abdominal pain, bleeding, and changes in bowel habits. It may be needed once a year, depending on factors such as your:  · Age  · Health history  · Family health history  · Symptoms  · Results from any prior colonoscopy  Risks and possible complications  These include:  · Bleeding               · A puncture or tear in the colon   · Risks of anesthesia  · A cancer lesion not being seen  Getting ready   To prepare for the test:  · Talk with your healthcare provider about the risks of the test (see below). Also ask your healthcare provider about alternatives to the test.  · Tell your healthcare provider about any medicines you take. Also tell him or her about any health conditions you may have.  · Make sure your rectum and colon are empty for the test. Follow the diet and bowel prep instructions exactly. If you dont, the test may need to be rescheduled.  · Plan for a friend or family member to drive you home after the test.     Colonoscopy provides an inside view of the entire colon.     You may discuss the results with your doctor right away or at a future visit.  During the test   The test is usually done in the hospital on an outpatient basis. This means you go home the same day. The procedure takes about 30 minutes. During that time:  · You are given relaxing (sedating) medicine through an IV line. You may be drowsy, or fully asleep.  · The healthcare provider will first give you a physical exam to  check for anal and rectal problems.  · Then the anus is lubricated and the scope inserted.  · If you are awake, you may have a feeling similar to needing to have a bowel movement. You may also feel pressure as air is pumped into the colon. Its OK to pass gas during the procedure.  · Biopsy, polyp removal, or other treatments may be done during the test.  After the test   You may have gas right after the test. It can help to try to pass it to help prevent later bloating. Your healthcare provider may discuss the results with you right away. Or you may need to schedule a follow-up visit to talk about the results. After the test, you can go back to your normal eating and other activities. You may be tired from the sedation and need to rest for a few hours.  Date Last Reviewed: 11/1/2016 © 2000-2017 [employer]. [address] [place] [address], [place] [address]. All rights reserved. This information is not intended as a substitute for professional medical care. Always follow your healthcare professional's instructions.

## 2018-06-01 NOTE — ANESTHESIA POSTPROCEDURE EVALUATION
"Anesthesia Post Evaluation    Patient: Aurora Castle    Procedure(s) Performed: Procedure(s) (LRB):  COLONOSCOPY (N/A)    Final Anesthesia Type: general  Patient location during evaluation: PACU  Patient participation: Yes- Able to Participate  Level of consciousness: awake and alert and oriented  Post-procedure vital signs: reviewed and stable  Pain management: adequate  Airway patency: patent  PONV status at discharge: No PONV  Anesthetic complications: no      Cardiovascular status: blood pressure returned to baseline  Respiratory status: unassisted, room air and spontaneous ventilation  Hydration status: euvolemic  Follow-up not needed.        Visit Vitals  /74   Pulse 61   Temp 36.7 °C (98.1 °F)   Resp 20   Ht 5' 10" (1.778 m)   Wt 77.1 kg (170 lb)   SpO2 100%   Breastfeeding? No   BMI 24.39 kg/m²       Pain/Shean Score: Pain Assessment Performed: Yes (6/1/2018  9:53 AM)  Presence of Pain: non-verbal indicators absent (6/1/2018  9:53 AM)  Shena Score: 10 (6/1/2018 10:08 AM)      "

## 2018-06-01 NOTE — [employer] PATIENT INSTRUCTIONS
Discharge Summary/Instructions after an Endoscopic Procedure  Patient Name: Aurora Wray  Patient MRN: [id number]  Patient YOB: 1945 Friday, June 01, 2018  Jim Borges MD  RESTRICTIONS:  During your procedure today, you received medications for sedation.  These   medications may affect your judgment, balance and coordination.  Therefore,   for 24 hours, you have the following restrictions:   - DO NOT drive a car, operate machinery, make legal/financial decisions,   sign important papers or drink alcohol.    ACTIVITY:  The following day: return to full activity including work, except no heavy   lifting, straining or running for 3 days if polyps were removed.  DIET:  Eat and drink normally unless instructed otherwise.     TREATMENT FOR COMMON SIDE EFFECTS:  - Mild abdominal pain, nausea, belching, bloating or excessive gas:  rest,   eat lightly and use a heating pad.  - Sore Throat: treat with throat lozenges and/or gargle with warm salt   water.  - Because air was used during the procedure, expelling large amounts of air   from your rectum or belching is normal.  - If a bowel prep was taken, you may not have a bowel movement for 1-3 days.    This is normal.  SYMPTOMS TO WATCH FOR AND REPORT TO YOUR PHYSICIAN:  1. Abdominal pain or bloating, other than gas cramps.  2. Chest pain.  3. Back pain.  4. Signs of infection such as: chills or fever occurring within 24 hours   after the procedure.  5. Rectal bleeding, which would show as bright red, maroon, or black stools.   (A tablespoon of blood from the rectum is not serious, especially if   hemorrhoids are present.)  6. Vomiting.  7. Weakness or dizziness.  GO DIRECTLY TO THE NEAREST EMERGENCY ROOM IF YOU HAVE ANY OF THE FOLLOWING:      Difficulty breathing              Chills and/or fever over 101 F   Persistent vomiting and/or vomiting blood   Severe abdominal pain   Severe chest pain   Black, tarry stools   Bleeding- more than one tablespoon   Any  other symptom or condition that you feel may need urgent attention  Your doctor recommends these additional instructions:  If any biopsies were taken, your doctors clinic will contact you in 1 to 2   weeks with any results.  - Patient has a contact number available for emergencies.  The signs and   symptoms of potential delayed complications were discussed with the   patient.  Return to normal activities tomorrow.  Written discharge   instructions were provided to the patient.   - Discharge patient to home.   - Resume previous diet.   - Continue present medications.   - Repeat colonoscopy in 5 years for surveillance.   For questions, problems or results please call your physician - Jim Borges MD at Work:  [phone].  [facility], EMERGENCY ROOM PHONE NUMBER: [phone]  IF A COMPLICATION OR EMERGENCY SITUATION ARISES AND YOU ARE UNABLE TO REACH   YOUR PHYSICIAN - GO DIRECTLY TO THE EMERGENCY ROOM.  Jim Borges MD  6/1/2018 9:52:46 AM  This report has been verified and signed electronically.  [employer]

## 2018-06-01 NOTE — PLAN OF CARE
Patient states ready for discharge. Patient educated on discharge instructions. Patient verbalizes understanding.

## 2018-06-01 NOTE — TRANSFER OF CARE
"Anesthesia Transfer of Care Note    Patient: Aurora Castle    Procedure(s) Performed: Procedure(s) (LRB):  COLONOSCOPY (N/A)    Patient location: PACU    Anesthesia Type: general    Transport from OR: Transported from OR on room air with adequate spontaneous ventilation    Post pain: adequate analgesia    Post assessment: no apparent anesthetic complications and tolerated procedure well    Post vital signs: stable    Level of consciousness: sedated    Nausea/Vomiting: no nausea/vomiting    Complications: none    Transfer of care protocol was followed      Last vitals:   Visit Vitals  /83   Pulse (!) 54   Temp 36.7 °C (98.1 °F)   Resp 18   Ht 5' 10" (1.778 m)   Wt 77.1 kg (170 lb)   SpO2 98%   Breastfeeding? No   BMI 24.39 kg/m²     "

## 2018-06-01 NOTE — ANESTHESIA PREPROCEDURE EVALUATION
2018  Aurora Castle is a 72 y.o., female.  Past Medical History:   Diagnosis Date    Arthritis     History of adenomatous polyp of colon     Joint pain     [facility]neumonia     during [facility]     Tendonitis of ankle or foot     Trouble in sleeping     Urinary incontinence      Past Surgical History:   Procedure Laterality Date     SECTION  2009    hemorrhiodectomy      tendon surgery L foot           Anesthesia Evaluation    I have reviewed the Patient Summary Reports.    I have reviewed the Nursing Notes.   I have reviewed the Medications.     Review of Systems  Anesthesia Hx:  No problems with previous Anesthesia  Neg history of prior surgery. Denies Family Hx of Anesthesia complications.   Denies Personal Hx of Anesthesia complications.   Hematology/Oncology:  Hematology Normal   Oncology Normal     EENT/Dental:EENT/Dental Normal   Cardiovascular:  Cardiovascular Normal     Pulmonary:  Pulmonary Normal    Renal/:  Renal/ Normal     Hepatic/GI:  Hepatic/GI Normal    Musculoskeletal:   Arthritis     Neurological:  Neurology Normal    Endocrine:  Endocrine Normal    Dermatological:  Skin Normal    Psych:  Psychiatric Normal           Physical Exam  General:  Well nourished    Airway/Jaw/Neck:  Airway Findings: Mouth Opening: Normal General Airway Assessment: Adult  Mallampati: II  TM Distance: Normal, at least 6 cm        Eyes/Ears/Nose:  EYES/EARS/NOSE FINDINGS: Normal   Dental:  Dental Findings:   Chest/Lungs:  Chest/Lungs Clear    Heart/Vascular:  Heart Findings: Normal Heart murmur: negative Vascular Findings: Normal    Abdomen:  Abdomen Findings: Normal    Musculoskeletal:  Musculoskeletal Findings: Normal   Skin:  Skin Findings: Normal    Mental Status:  Mental Status Findings: Normal        Anesthesia Plan  Type of Anesthesia, risks & benefits discussed:  Anesthesia Type:   general  Patient's Preference: General  Intra-op Monitoring Plan: standard ASA monitors  Intra-op Monitoring Plan Comments:   Post Op Pain Control Plan:   Post Op Pain Control Plan Comments:   Induction:   IV  Beta Blocker:  Patient is not currently on a Beta-Blocker (No further documentation required).       Informed Consent: Patient understands risks and agrees with Anesthesia plan.  Questions answered. Anesthesia consent signed with patient.  ASA Score: 2     Day of Surgery Review of History & Physical: I have interviewed and examined the patient. I have reviewed the patient's H&P dated:  There are no significant changes.  H&P update referred to the surgeon.         Ready For Surgery From Anesthesia Perspective.

## 2018-06-08 ENCOUNTER — TELEPHONE (OUTPATIENT)
Dept: ENDOSCOPY | Facility: HOSPITAL | Age: 73
End: 2018-06-08

## 2018-07-05 ENCOUNTER — HOSPITAL ENCOUNTER (OUTPATIENT)
Dept: RADIOLOGY | Facility: HOSPITAL | Age: 73
Discharge: HOME | End: 2018-07-05
Attending: INTERNAL MEDICINE
Payer: MEDICARE

## 2018-07-05 DIAGNOSIS — Z12.31 ENCOUNTER FOR SCREENING MAMMOGRAM FOR MALIGNANT NEOPLASM OF BREAST: ICD-10-CM

## 2018-07-05 PROCEDURE — 77067 SCR MAMMO BI INCL CAD: CPT | Mod: 26,,, | Performed by: RADIOLOGY

## 2018-07-05 PROCEDURE — 77063 BREAST TOMOSYNTHESIS BI: CPT | Mod: 26,,, | Performed by: RADIOLOGY

## 2018-07-05 PROCEDURE — 77067 SCR MAMMO BI INCL CAD: CPT | Mod: TC

## 2018-08-02 ENCOUNTER — HOSPITAL ENCOUNTER (OUTPATIENT)
Dept: RADIOLOGY | Facility: CLINIC | Age: 73
Discharge: HOME | End: 2018-08-02
Attending: INTERNAL MEDICINE
Payer: MEDICARE

## 2018-08-02 DIAGNOSIS — N95.9 MENOPAUSAL DISORDER: ICD-10-CM

## 2018-08-02 PROCEDURE — 77080 DXA BONE DENSITY AXIAL: CPT | Mod: 26,,, | Performed by: INTERNAL MEDICINE

## 2018-08-02 PROCEDURE — 77080 DXA BONE DENSITY AXIAL: CPT | Mod: TC

## 2018-08-03 ENCOUNTER — PATIENT MESSAGE (OUTPATIENT)
Dept: INTERNAL MEDICINE | Facility: CLINIC | Age: 73
End: 2018-08-03

## 2018-08-14 ENCOUNTER — PES CALL (OUTPATIENT)
Dept: ADMINISTRATIVE | Facility: CLINIC | Age: 73
End: 2018-08-14

## 2018-08-15 DIAGNOSIS — R93.7 ABNORMAL X-RAY OF LUMBAR SPINE: ICD-10-CM

## 2018-08-15 DIAGNOSIS — R93.7 ABNORMAL BONE DENSITY SCREENING: Primary | ICD-10-CM

## 2018-08-16 ENCOUNTER — TELEPHONE (OUTPATIENT)
Dept: INTERNAL MEDICINE | Facility: CLINIC | Age: 73
End: 2018-08-16

## 2018-08-16 NOTE — TELEPHONE ENCOUNTER
----- Message from Isabelle Vincent MD sent at 8/15/2018  5:50 PM CDT -----  pls call - bone density is normal, but 5th lumbar vertebra is denser than others so we should get xray to rule out fracture.  pls schedule.

## 2018-08-17 ENCOUNTER — TELEPHONE (OUTPATIENT)
Dept: INTERNAL MEDICINE | Facility: CLINIC | Age: 73
End: 2018-08-17

## 2018-09-06 ENCOUNTER — HOSPITAL ENCOUNTER (OUTPATIENT)
Dept: RADIOLOGY | Facility: HOSPITAL | Age: 73
Discharge: HOME | End: 2018-09-06
Attending: INTERNAL MEDICINE
Payer: MEDICARE

## 2018-09-06 ENCOUNTER — OFFICE VISIT (OUTPATIENT)
Dept: INTERNAL MEDICINE | Facility: CLINIC | Age: 73
End: 2018-09-06
Payer: MEDICARE

## 2018-09-06 VITALS
BODY MASS INDEX: 25.31 KG/M2 | HEIGHT: 70 IN | WEIGHT: 176.81 LBS | OXYGEN SATURATION: 99 % | DIASTOLIC BLOOD PRESSURE: 70 MMHG | SYSTOLIC BLOOD PRESSURE: 136 MMHG | HEART RATE: 63 BPM

## 2018-09-06 DIAGNOSIS — Z00.00 ENCOUNTER FOR PREVENTIVE HEALTH EXAMINATION: Primary | ICD-10-CM

## 2018-09-06 DIAGNOSIS — Z86.010 HISTORY OF ADENOMATOUS POLYP OF COLON: ICD-10-CM

## 2018-09-06 DIAGNOSIS — R93.7 ABNORMAL BONE DENSITY SCREENING: ICD-10-CM

## 2018-09-06 DIAGNOSIS — M17.11 PRIMARY OSTEOARTHRITIS OF RIGHT KNEE: ICD-10-CM

## 2018-09-06 DIAGNOSIS — E55.9 MILD VITAMIN D DEFICIENCY: ICD-10-CM

## 2018-09-06 DIAGNOSIS — G47.00 INSOMNIA, UNSPECIFIED TYPE: ICD-10-CM

## 2018-09-06 PROCEDURE — 99213 OFFICE O/P EST LOW 20 MIN: CPT | Mod: PBBFAC,25 | Performed by: NURSE PRACTITIONER

## 2018-09-06 PROCEDURE — 72100 X-RAY EXAM L-S SPINE 2/3 VWS: CPT | Mod: 26,,, | Performed by: RADIOLOGY

## 2018-09-06 PROCEDURE — 99999 PR PBB SHADOW E&M-EST. PATIENT-LVL III: CPT | Mod: PBBFAC,,, | Performed by: NURSE PRACTITIONER

## 2018-09-06 PROCEDURE — 72100 X-RAY EXAM L-S SPINE 2/3 VWS: CPT | Mod: TC

## 2018-09-06 PROCEDURE — G0439 PPPS, SUBSEQ VISIT: HCPCS | Mod: ,,, | Performed by: NURSE PRACTITIONER

## 2018-09-06 NOTE — PROGRESS NOTES
"Aurora Castle presented for a  Medicare AWV and comprehensive Health Risk Assessment today. The following components were reviewed and updated:    · Medical history  · Family History  · Social history  · Allergies and Current Medications  · Health Risk Assessment  · Health Maintenance  · Care Team     ** See Completed Assessments for Annual Wellness Visit within the encounter summary.**       The following assessments were completed:  · Living Situation  · CAGE  · Depression Screening  · Timed Get Up and Go  · Whisper Test  · Cognitive Function Screening        · Nutrition Screening  · ADL Screening  · PAQ Screening    Vitals:    09/06/18 1046   BP: 136/70   BP Location: Left arm   Patient Position: Sitting   BP Method: Large (Manual)   Pulse: 63   SpO2: 99%   Weight: 80.2 kg (176 lb 12.9 oz)   Height: 5' 10" (1.778 m)     Body mass index is 25.37 kg/m².  Physical Exam   Constitutional: She is oriented to person, place, and time. Vital signs are normal. She appears well-developed and well-nourished.   HENT:   Head: Normocephalic.   Right Ear: Hearing, tympanic membrane, external ear and ear canal normal.   Left Ear: Hearing, tympanic membrane, external ear and ear canal normal.   Eyes: Conjunctivae, EOM and lids are normal. Pupils are equal, round, and reactive to light. Lids are everted and swept, no foreign bodies found.   Neck: Trachea normal, normal range of motion and full passive range of motion without pain. Neck supple. No JVD present. Carotid bruit is not present.   Cardiovascular: Normal rate, regular rhythm, S1 normal, S2 normal, normal heart sounds, intact distal pulses and normal pulses.   Pulmonary/Chest: Effort normal and breath sounds normal.   Abdominal: Soft. Normal appearance and bowel sounds are normal. There is no hepatosplenomegaly.   Musculoskeletal: Normal range of motion.   Neurological: She is alert and oriented to person, place, and time. She has normal strength and normal reflexes. "   Skin: Skin is warm, dry and intact. Capillary refill takes less than 2 seconds.   Psychiatric: She has a normal mood and affect. Her speech is normal and behavior is normal. Judgment and thought content normal. Cognition and memory are normal.   Nursing note and vitals reviewed.        Diagnoses and health risks identified today and associated recommendations/orders:    1. Encounter for preventive health examination  Exam done    Health Maintenance updated    Records reviewed    2. History of adenomatous polyp of colon  Stable, followed by PCP  C-scope up to date and done this year    3. Mild vitamin D deficiency  Stable, followed by PCP    4. Insomnia, unspecified type  Stable, followed by PCP    5. Primary osteoarthritis of right knee  Stable, followed by PCP    6. BMI 25.0-25.9,adult  BMI reviewed      Provided Aurora with a 5-10 year written screening schedule and personal prevention plan. Recommendations were developed using the USPSTF age appropriate recommendations. Education, counseling, and referrals were provided as needed. After Visit Summary printed and given to patient which includes a list of additional screenings\tests needed.    Follow-up in about 8 months (around 5/6/2019), or with PCP Dr. Vincent for annual PE.    Lorrie Nelson DNP

## 2018-09-06 NOTE — PATIENT INSTRUCTIONS
Counseling and Referral of Other Preventative  (Italic type indicates deductible and co-insurance are waived)    Patient Name: Aurora Castle  Today's Date: 9/6/2018    Health Maintenance       Date Due Completion Date    Influenza Vaccine 10/25/2018 (Originally 8/1/2018) 1/29/2018 (Done)    Override on 1/29/2018: Done    Override on 11/30/2017: Done ([facility] [address])    Override on 2/22/2016: Done    Lipid Panel 06/24/2020 6/24/2015    Mammogram 07/05/2020 7/5/2018    DEXA SCAN 08/02/2022 8/2/2018    TETANUS VACCINE 03/04/2023 3/4/2013    Colonoscopy 06/01/2023 6/1/2018        No orders of the defined types were placed in this encounter.    The following information is provided to all patients.  This information is to help you find resources for any of the problems found today that may be affecting your health:                Living healthy guide: www.Counts include 234 beds at the [facility]      Understanding Diabetes: www.diabetes.org      Eating healthy: www.cdc.gov/healthyweight      CDC home safety checklist: www.cdc.gov/steadi/patient.html      Agency on Aging: www.goea.[place].[facility]      Alcoholics anonymous (AA): www.aa.org      Physical Activity: www.jim.nih.gov/un9zoqd      Tobacco use: www.quitwithusla.org

## 2019-04-18 ENCOUNTER — PES CALL (OUTPATIENT)
Dept: ADMINISTRATIVE | Facility: CLINIC | Age: 74
End: 2019-04-18

## 2019-06-04 ENCOUNTER — PATIENT OUTREACH (OUTPATIENT)
Dept: ADMINISTRATIVE | Facility: HOSPITAL | Age: 74
End: 2019-06-04

## 2019-06-04 ENCOUNTER — TELEPHONE (OUTPATIENT)
Dept: ADMINISTRATIVE | Facility: HOSPITAL | Age: 74
End: 2019-06-04

## 2019-06-04 DIAGNOSIS — Z00.00 ANNUAL PHYSICAL EXAM: Primary | ICD-10-CM

## 2019-06-04 DIAGNOSIS — Z79.1 NSAID LONG-TERM USE: ICD-10-CM

## 2019-06-04 NOTE — PROGRESS NOTES
Health Maintenance Due   Topic Date Due    Mammogram  07/05/2019     Shingles vaccine due.    Pre-visit chart audit complete.

## 2019-06-11 ENCOUNTER — LAB VISIT (OUTPATIENT)
Dept: LAB | Facility: HOSPITAL | Age: 74
End: 2019-06-11
Attending: INTERNAL MEDICINE
Payer: MEDICARE

## 2019-06-11 DIAGNOSIS — Z00.00 ANNUAL PHYSICAL EXAM: ICD-10-CM

## 2019-06-11 DIAGNOSIS — Z79.1 NSAID LONG-TERM USE: ICD-10-CM

## 2019-06-11 LAB
ALBUMIN SERPL BCP-MCNC: 4.1 G/DL (ref 3.5–5.2)
ALP SERPL-CCNC: 60 U/L (ref 55–135)
ALT SERPL W/O P-5'-P-CCNC: 14 U/L (ref 10–44)
ANION GAP SERPL CALC-SCNC: 11 MMOL/L (ref 8–16)
AST SERPL-CCNC: 19 U/L (ref 10–40)
BASOPHILS # BLD AUTO: 0.05 K/UL (ref 0–0.2)
BASOPHILS NFR BLD: 1.1 % (ref 0–1.9)
BILIRUB SERPL-MCNC: 0.6 MG/DL (ref 0.1–1)
BUN SERPL-MCNC: 29 MG/DL (ref 8–23)
CALCIUM SERPL-MCNC: 9.3 MG/DL (ref 8.7–10.5)
CHLORIDE SERPL-SCNC: 106 MMOL/L (ref 95–110)
CO2 SERPL-SCNC: 24 MMOL/L (ref 23–29)
CREAT SERPL-MCNC: 0.7 MG/DL (ref 0.5–1.4)
DIFFERENTIAL METHOD: ABNORMAL
EOSINOPHIL # BLD AUTO: 0.2 K/UL (ref 0–0.5)
EOSINOPHIL NFR BLD: 4 % (ref 0–8)
ERYTHROCYTE [DISTWIDTH] IN BLOOD BY AUTOMATED COUNT: 13.2 % (ref 11.5–14.5)
EST. GFR  (AFRICAN AMERICAN): >60 ML/MIN/1.73 M^2
EST. GFR  (NON AFRICAN AMERICAN): >60 ML/MIN/1.73 M^2
GLUCOSE SERPL-MCNC: 93 MG/DL (ref 70–110)
HCT VFR BLD AUTO: 40 % (ref 37–48.5)
HGB BLD-MCNC: 12.9 G/DL (ref 12–16)
LYMPHOCYTES # BLD AUTO: 1.4 K/UL (ref 1–4.8)
LYMPHOCYTES NFR BLD: 31.3 % (ref 18–48)
MCH RBC QN AUTO: 32.5 PG (ref 27–31)
MCHC RBC AUTO-ENTMCNC: 32.3 G/DL (ref 32–36)
MCV RBC AUTO: 101 FL (ref 82–98)
MONOCYTES # BLD AUTO: 0.4 K/UL (ref 0.3–1)
MONOCYTES NFR BLD: 9.6 % (ref 4–15)
NEUTROPHILS # BLD AUTO: 2.4 K/UL (ref 1.8–7.7)
NEUTROPHILS NFR BLD: 54 % (ref 38–73)
PLATELET # BLD AUTO: 195 K/UL (ref 150–350)
PMV BLD AUTO: 10.8 FL (ref 9.2–12.9)
POTASSIUM SERPL-SCNC: 4 MMOL/L (ref 3.5–5.1)
PROT SERPL-MCNC: 6.6 G/DL (ref 6–8.4)
RBC # BLD AUTO: 3.97 M/UL (ref 4–5.4)
SODIUM SERPL-SCNC: 141 MMOL/L (ref 136–145)
WBC # BLD AUTO: 4.48 K/UL (ref 3.9–12.7)

## 2019-06-11 PROCEDURE — 36415 COLL VENOUS BLD VENIPUNCTURE: CPT | Mod: HCNC

## 2019-06-11 PROCEDURE — 80053 COMPREHEN METABOLIC PANEL: CPT | Mod: HCNC

## 2019-06-11 PROCEDURE — 85025 COMPLETE CBC W/AUTO DIFF WBC: CPT | Mod: HCNC

## 2019-06-18 ENCOUNTER — OFFICE VISIT (OUTPATIENT)
Dept: INTERNAL MEDICINE | Facility: CLINIC | Age: 74
End: 2019-06-18
Payer: MEDICARE

## 2019-06-18 VITALS
HEIGHT: 70 IN | BODY MASS INDEX: 23.99 KG/M2 | OXYGEN SATURATION: 97 % | HEART RATE: 60 BPM | DIASTOLIC BLOOD PRESSURE: 80 MMHG | WEIGHT: 167.56 LBS | SYSTOLIC BLOOD PRESSURE: 128 MMHG

## 2019-06-18 DIAGNOSIS — M17.11 PRIMARY OSTEOARTHRITIS OF RIGHT KNEE: ICD-10-CM

## 2019-06-18 DIAGNOSIS — Z00.00 ANNUAL PHYSICAL EXAM: Primary | ICD-10-CM

## 2019-06-18 PROBLEM — Z12.11 SCREENING FOR COLON CANCER: Status: RESOLVED | Noted: 2018-06-01 | Resolved: 2019-06-18

## 2019-06-18 PROCEDURE — 99999 PR PBB SHADOW E&M-EST. PATIENT-LVL III: CPT | Mod: PBBFAC,HCNC,, | Performed by: INTERNAL MEDICINE

## 2019-06-18 PROCEDURE — 99397 PR PREVENTIVE VISIT,EST,65 & OVER: ICD-10-PCS | Mod: HCNC,S$GLB,, | Performed by: INTERNAL MEDICINE

## 2019-06-18 PROCEDURE — 99397 PER PM REEVAL EST PAT 65+ YR: CPT | Mod: HCNC,S$GLB,, | Performed by: INTERNAL MEDICINE

## 2019-06-18 PROCEDURE — 99999 PR PBB SHADOW E&M-EST. PATIENT-LVL III: ICD-10-PCS | Mod: PBBFAC,HCNC,, | Performed by: INTERNAL MEDICINE

## 2019-06-26 ENCOUNTER — TELEPHONE (OUTPATIENT)
Dept: INTERNAL MEDICINE | Facility: CLINIC | Age: 74
End: 2019-06-26

## 2019-06-26 DIAGNOSIS — Z12.31 ENCOUNTER FOR SCREENING MAMMOGRAM FOR MALIGNANT NEOPLASM OF BREAST: Primary | ICD-10-CM

## 2019-06-26 NOTE — TELEPHONE ENCOUNTER
----- Message from Radha Miller sent at 6/26/2019 11:50 AM CDT -----  Contact: Patient [phone]  Caller is requesting to schedule their annual screening mammogram appointment. Order is not listed in Epic.  Please enter order and contact patient to schedule.    Thank you

## 2019-06-26 NOTE — TELEPHONE ENCOUNTER
----- Message from Radha Millre sent at 6/26/2019 11:50 AM CDT -----  Contact: Patient [phone]  Caller is requesting to schedule their annual screening mammogram appointment. Order is not listed in Epic.  Please enter order and contact patient to schedule.    Thank you

## 2019-07-02 ENCOUNTER — OFFICE VISIT (OUTPATIENT)
Dept: INTERNAL MEDICINE | Facility: CLINIC | Age: 74
End: 2019-07-02
Payer: MEDICARE

## 2019-07-02 VITALS
DIASTOLIC BLOOD PRESSURE: 80 MMHG | WEIGHT: 170.44 LBS | HEIGHT: 70 IN | BODY MASS INDEX: 24.4 KG/M2 | SYSTOLIC BLOOD PRESSURE: 146 MMHG | HEART RATE: 56 BPM

## 2019-07-02 DIAGNOSIS — Z86.010 HISTORY OF ADENOMATOUS POLYP OF COLON: ICD-10-CM

## 2019-07-02 DIAGNOSIS — Z00.00 ENCOUNTER FOR PREVENTIVE HEALTH EXAMINATION: Primary | ICD-10-CM

## 2019-07-02 DIAGNOSIS — E55.9 MILD VITAMIN D DEFICIENCY: ICD-10-CM

## 2019-07-02 DIAGNOSIS — M17.11 PRIMARY OSTEOARTHRITIS OF RIGHT KNEE: ICD-10-CM

## 2019-07-02 DIAGNOSIS — G47.00 INSOMNIA, UNSPECIFIED TYPE: ICD-10-CM

## 2019-07-02 PROCEDURE — 99999 PR PBB SHADOW E&M-EST. PATIENT-LVL IV: CPT | Mod: PBBFAC,HCNC,, | Performed by: NURSE PRACTITIONER

## 2019-07-02 PROCEDURE — G0439 PR MEDICARE ANNUAL WELLNESS SUBSEQUENT VISIT: ICD-10-PCS | Mod: HCNC,S$GLB,, | Performed by: NURSE PRACTITIONER

## 2019-07-02 PROCEDURE — 99999 PR PBB SHADOW E&M-EST. PATIENT-LVL IV: ICD-10-PCS | Mod: PBBFAC,HCNC,, | Performed by: NURSE PRACTITIONER

## 2019-07-02 PROCEDURE — G0439 PPPS, SUBSEQ VISIT: HCPCS | Mod: HCNC,S$GLB,, | Performed by: NURSE PRACTITIONER

## 2019-07-02 NOTE — PROGRESS NOTES
I offered to discuss end of life issues, including information on how to make advance directives that the patient could use to name someone who would make medical decisions on their behalf if they became too ill to make themselves.    ___Patient declined  __Patient is interested, I provided paper work and offered to discuss.

## 2019-07-02 NOTE — PROGRESS NOTES
"Aurora Castle presented for a  Medicare AWV and comprehensive Health Risk Assessment today. The following components were reviewed and updated:    · Medical history  · Family History  · Social history  · Allergies and Current Medications  · Health Risk Assessment  · Health Maintenance  · Care Team     ** See Completed Assessments for Annual Wellness Visit within the encounter summary.**       The following assessments were completed:  · Living Situation  · CAGE  · Depression Screening  · Timed Get Up and Go  · Whisper Test  · Cognitive Function Screening  · Nutrition Screening  · ADL Screening  · PAQ Screening        Vitals:    07/02/19 1041   BP: (!) 146/80   BP Location: Left arm   Patient Position: Sitting   Pulse: (!) 56   Weight: 77.3 kg (170 lb 6.7 oz)   Height: 5' 10" (1.778 m)     Body mass index is 24.45 kg/m².     Physical Exam   Constitutional: She is oriented to person, place, and time. She appears well-developed and well-nourished. No distress.   HENT:   Head: Normocephalic and atraumatic.   Eyes: No scleral icterus.   Neck: Normal range of motion. Neck supple.   Cardiovascular: Normal rate, regular rhythm, normal heart sounds and intact distal pulses.   Pulmonary/Chest: Effort normal and breath sounds normal. No respiratory distress.   Abdominal: She exhibits no distension.   Musculoskeletal: Normal range of motion. She exhibits no edema.   Neurological: She is alert and oriented to person, place, and time.   Skin: Skin is warm and dry. She is not diaphoretic.   Psychiatric: She has a normal mood and affect. Her behavior is normal.         Diagnoses and health risks identified today and associated recommendations/orders:    1. Encounter for preventive health examination  Assessments completed  Preventive health recommendations reviewed  Shingrix discussed  Mammogram already scheduled    2. Mild vitamin D deficiency  Stable.   Controlled with current medical therapy  Followed by PCP.     3. History of " "adenomatous polyp of colon  Stable.   Last colonoscopy was 06/01/18  Followed by PCP.     4. Primary osteoarthritis of right knee  Per patient, pain, "bone one bone feeling" is worsening  Has had knee injections in the past  Followed by orthopedics.     5. Insomnia, unspecified type  Stable.   Controlled with current medical therapy  Followed by PCP.     BP mildly elevated at today's visit.  Patient just saw her PCP two weeks ago and bp was 128/80.  Has not had hypertension in the past. Other than knee complaints, patient generally feeling well.        Provided Aurora with a 5-10 year written screening schedule and personal prevention plan. Recommendations were developed using the USPSTF age appropriate recommendations. Education, counseling, and referrals were provided as needed. After Visit Summary printed and given to patient which includes a list of additional screenings\tests needed.    Follow up in about 1 year (around 7/2/2020) for a routine visit with your primary care provider or sooner if problems arise. .    Jonna Noe NP  "

## 2019-07-02 NOTE — PATIENT INSTRUCTIONS
Counseling and Referral of Other Preventative  (Italic type indicates deductible and co-insurance are waived)    Patient Name: Aurora Castle  Today's Date: 7/2/2019    Health Maintenance       Date Due Completion Date    Shingles Vaccine (2 of 3) 09/22/2010 7/28/2010    Mammogram 07/05/2019 7/5/2018    Influenza Vaccine 08/01/2019 10/17/2018    Override on 1/29/2018: Done    Override on 11/30/2017: Done ([facility] [address])    Override on 2/22/2016: Done    Lipid Panel 06/24/2020 6/24/2015    DEXA SCAN 08/02/2022 8/2/2018    TETANUS VACCINE 03/04/2023 3/4/2013    Colonoscopy 06/01/2023 6/1/2018        No orders of the defined types were placed in this encounter.    The following information is provided to all patients.  This information is to help you find resources for any of the problems found today that may be affecting your health:                Living healthy guide: www.[facility].[place].gov      Understanding Diabetes: www.diabetes.org      Eating healthy: www.cdc.gov/healthyweight      [facility] home safety checklist: www.cdc.gov/steadi/patient.html      Agency on Aging: www.goea.[place].gov      Alcoholics anonymous (AA): www.aa.org      Physical Activity: www.jim.nih.gov/xp3garb      Tobacco use: www.quitwithusla.org

## 2019-07-22 ENCOUNTER — HOSPITAL ENCOUNTER (OUTPATIENT)
Dept: RADIOLOGY | Facility: HOSPITAL | Age: 74
Discharge: HOME | End: 2019-07-22
Attending: INTERNAL MEDICINE
Payer: MEDICARE

## 2019-07-22 DIAGNOSIS — Z12.31 ENCOUNTER FOR SCREENING MAMMOGRAM FOR MALIGNANT NEOPLASM OF BREAST: ICD-10-CM

## 2019-07-22 PROCEDURE — 77063 MAMMO DIGITAL SCREENING BILAT WITH TOMOSYNTHESIS_CAD: ICD-10-PCS | Mod: 26,HCNC,, | Performed by: RADIOLOGY

## 2019-07-22 PROCEDURE — 77067 SCR MAMMO BI INCL CAD: CPT | Mod: 26,HCNC,, | Performed by: RADIOLOGY

## 2019-07-22 PROCEDURE — 77063 BREAST TOMOSYNTHESIS BI: CPT | Mod: 26,HCNC,, | Performed by: RADIOLOGY

## 2019-07-22 PROCEDURE — 77067 SCR MAMMO BI INCL CAD: CPT | Mod: TC,HCNC

## 2019-07-22 PROCEDURE — 77067 MAMMO DIGITAL SCREENING BILAT WITH TOMOSYNTHESIS_CAD: ICD-10-PCS | Mod: 26,HCNC,, | Performed by: RADIOLOGY

## 2019-08-05 DIAGNOSIS — M25.561 RIGHT KNEE PAIN, UNSPECIFIED CHRONICITY: Primary | ICD-10-CM

## 2019-08-06 ENCOUNTER — OFFICE VISIT (OUTPATIENT)
Dept: ORTHOPEDICS | Facility: CLINIC | Age: 74
End: 2019-08-06
Payer: MEDICARE

## 2019-08-06 ENCOUNTER — HOSPITAL ENCOUNTER (OUTPATIENT)
Dept: RADIOLOGY | Facility: HOSPITAL | Age: 74
Discharge: HOME | End: 2019-08-06
Attending: ORTHOPAEDIC SURGERY
Payer: MEDICARE

## 2019-08-06 VITALS — BODY MASS INDEX: 24.46 KG/M2 | WEIGHT: 170.88 LBS | HEIGHT: 70 IN

## 2019-08-06 DIAGNOSIS — M25.561 RIGHT KNEE PAIN, UNSPECIFIED CHRONICITY: ICD-10-CM

## 2019-08-06 DIAGNOSIS — M17.11 PRIMARY OSTEOARTHRITIS OF RIGHT KNEE: Primary | ICD-10-CM

## 2019-08-06 PROCEDURE — 1101F PR PT FALLS ASSESS DOC 0-1 FALLS W/OUT INJ PAST YR: ICD-10-PCS | Mod: HCNC,CPTII,S$GLB, | Performed by: ORTHOPAEDIC SURGERY

## 2019-08-06 PROCEDURE — 1101F PT FALLS ASSESS-DOCD LE1/YR: CPT | Mod: HCNC,CPTII,S$GLB, | Performed by: ORTHOPAEDIC SURGERY

## 2019-08-06 PROCEDURE — 99214 PR OFFICE/OUTPT VISIT, EST, LEVL IV, 30-39 MIN: ICD-10-PCS | Mod: 25,HCNC,S$GLB, | Performed by: ORTHOPAEDIC SURGERY

## 2019-08-06 PROCEDURE — 73564 X-RAY EXAM KNEE 4 OR MORE: CPT | Mod: 26,HCNC,RT, | Performed by: RADIOLOGY

## 2019-08-06 PROCEDURE — 73562 X-RAY EXAM OF KNEE 3: CPT | Mod: 26,59,HCNC,LT | Performed by: RADIOLOGY

## 2019-08-06 PROCEDURE — 20610 DRAIN/INJ JOINT/BURSA W/O US: CPT | Mod: HCNC,RT,S$GLB, | Performed by: ORTHOPAEDIC SURGERY

## 2019-08-06 PROCEDURE — 99999 PR PBB SHADOW E&M-EST. PATIENT-LVL III: CPT | Mod: PBBFAC,HCNC,, | Performed by: ORTHOPAEDIC SURGERY

## 2019-08-06 PROCEDURE — 99214 OFFICE O/P EST MOD 30 MIN: CPT | Mod: 25,HCNC,S$GLB, | Performed by: ORTHOPAEDIC SURGERY

## 2019-08-06 PROCEDURE — 73564 XR KNEE ORTHO RIGHT WITH FLEXION: ICD-10-PCS | Mod: 26,HCNC,RT, | Performed by: RADIOLOGY

## 2019-08-06 PROCEDURE — 73562 XR KNEE ORTHO RIGHT WITH FLEXION: ICD-10-PCS | Mod: 26,59,HCNC,LT | Performed by: RADIOLOGY

## 2019-08-06 PROCEDURE — 99999 PR PBB SHADOW E&M-EST. PATIENT-LVL III: ICD-10-PCS | Mod: PBBFAC,HCNC,, | Performed by: ORTHOPAEDIC SURGERY

## 2019-08-06 PROCEDURE — 20610 LARGE JOINT ASPIRATION/INJECTION: R KNEE: ICD-10-PCS | Mod: HCNC,RT,S$GLB, | Performed by: ORTHOPAEDIC SURGERY

## 2019-08-06 PROCEDURE — 73562 X-RAY EXAM OF KNEE 3: CPT | Mod: TC,HCNC,LT

## 2019-08-06 RX ORDER — TRIAMCINOLONE ACETONIDE 40 MG/ML
40 INJECTION, SUSPENSION INTRA-ARTICULAR; INTRAMUSCULAR
Status: DISCONTINUED | OUTPATIENT
Start: 2019-08-06 | End: 2019-08-06 | Stop reason: HOSPADM

## 2019-08-06 RX ADMIN — TRIAMCINOLONE ACETONIDE 40 MG: 40 INJECTION, SUSPENSION INTRA-ARTICULAR; INTRAMUSCULAR at 01:08

## 2019-08-06 NOTE — PROCEDURES
Large Joint Aspiration/Injection: R knee  Date/Time: 8/6/2019 1:05 PM  Performed by: Zack Patton III, MD  Authorized by: Zack Patton III, MD     Consent Done?:  Yes (Verbal)  Indications:  Pain  Timeout: Prior to procedure the correct patient, procedure, and site was verified      Location:  Knee  Site:  R knee  Prep: Patient was prepped and draped in usual sterile fashion    Needle size:  21 G  Approach: anterior peripatellar.  Medications:  40 mg triamcinolone acetonide 40 mg/mL  Patient tolerance:  Patient tolerated the procedure well with no immediate complications

## 2019-08-06 NOTE — LETTER
August 6, 2019      Isabelle Vincent MD  [address]  [facility] [address]           [facility] Orthoped[facility]  [address]  [facility] [address]  Phone: [phone]          Patient: Aurora Castle   MR Number: [id number]   YOB: 1945   Date of Visit: 8/6/2019       Dear Dr. Isabelle Vincent:    Thank you for referring Aurora Castle to me for evaluation. Attached you will find relevant portions of my assessment and plan of care.    If you have questions, please do not hesitate to call me. I look forward to following Aurora Castle along with you.    Sincerely,    Zack Patton III, MD    Enclosure  CC:  No Recipients    If you would like to receive this communication electronically, please contact [email or web address] or [phone] to request more information on [employer] Link access.    For providers and/or their staff who would like to refer a patient to [facility], please contact us through our one-stop-shop provider referral line, [facility], at [phone].    If you feel you have received this communication in error or would no longer like to receive these types of communications, please e-mail [email or web address]

## 2019-08-06 NOTE — PROGRESS NOTES
Subjective:     HPI:   Aurora Castle is a 73 y.o. female who presents for evaluation of right knee pain    She saw  in 2015 and had a steroid injection.  She saw Dr. [facility] in 2016 who did 2 steroid injections.  She has seen Alex in 2016 in 2017 who did a Synvisc-One injection in that around 3 Synvisc injections.     She complains of global moderate to severe activity-related right knee pain. No groin anterior thigh radicular pain or paresthesias    She takes Aleve 2 tablets twice a day that is better than the meloxicam she has been on the past.  She also is on glucosamine and CVD or oil.  Steroid in synthetic injections described above.  She has done home exercise conditioning program that she printed out from the Internet for his knee arthritis. She wears a brace if she is going to be standing for prolonged periods of time. No assistive devices.  She can walk about a miles she rides her bike 3 miles a day.  She says she has difficulty when she 1st gets up after sitting she feels like she limps when she walks but once she gets going she does okay    She is a .  She has a  at home    No significant complicating medical history       ROS:  The updated medical history is in the chart and has been reviewed. A review of systems is updated and there is no reported vision changes, ear/nose/mouth/throat complaints,  chest pain, shortness of breath, abdominal pain, urological complaints, fevers or chills, psychiatric complaints. Musculoskeletal and neurologcial symptoms are as documented. All other systems are negative.      Objective:   Exam:  There were no vitals filed for this visit.  Body mass index is 24.52 kg/m².    Physical examination included assessment of the patient's general appearance with particular attention to development, nutrition, body habitus, attention to grooming, and any evidence of distress.  Constitutional: The patient is a well-developed, well-nourished patient in  no acute distress.   Cardiovascular: Vascular examination included warmth and capillary refill as well inspection for edema and assessment of pedal pulses. Pulses are palpable and regular.  Musculoskeletal: Gait was assessed as to whether it was steady, non-antalgic, and/or required the use of an assist device. The patient was also asked to walk independently and get onto the examination table.  Skin: The skin was examined for any obvious rashes or lesions in the extremity.  Neurologic: Sensation is intact to light touch in the extremity. The patient has good coordination without hyperreflexia and is alert and oriented to person, place and time and has normal mood and affect.     All of the above were examined and found to be within normal limits except for the following pertinent clinical findings:    Antalgic gait with a limp favoring the right knee.  degree knee range of motion 13° valgus alignment she opens to valgus stress with a soft endpoint she does not correct to neutral.  She has got some minimal tenderness to the lateral joint line there is a mild effusion, significant flexion contracture but no extensor lag, knee stable to varus anterior-posterior stresses      Imaging:  Indication:  Right knee pain  Exam Ordered: Radiographs of the right knee include a standing anteroposterior view, a standing posterioanterior view, a lateral view in full flexion, and a sunrise view  Details of Examination: Todays exam shows evidence of joint space narrowing, osteophyte formation, and subchondral sclerosis, all consistent with degenerative arthritis of the knee.  No other significant findings are noted.  Impression:  Degenerative Arthritis, Right Knee    Severe valgus, significant progression compared to x-rays in 2015      Assessment:     Primary osteoarthritis of right knee [M17.11]  Severe valgus     Plan:       The above findings were discussed with patient length. We discussed the risks of conservative versus  "surgical management knee arthritis. Conservative management consisting of anti-inflammatory medications, glucosamine/chondroitin sulfate, weight loss, physical therapy, activity modification, as well as injections (lubricant versus corticosteroid) was discussed at length.     We discussed that her definitive solution be right total knee replacement.  Would plan on a Sigma knee but would need TC3 and MBT back up given the severity of her valgus deformity and laxity of her MCL    She wants to try to put that off until 20/20.  In the meantime we will get her set up with an  brace.  Will give her information sheet about non operative management and any home exercise conditioning program.  She would also like an injection today.    We can see her back in 3 months for recheck no x-rays in the meantime she would like to pursue synthetic gel injections either Alex or I can do those    Procedure:  The patient would like to proceed with a steroid injection into the knee.  We discussed the risks and benefits of cortisone injections.  After sterile preparation 1 cc of 40 mg of triamcinolone and 4 cc of 1% lidocaine was injected into the knee.  The patient tolerated the injection without any difficulty.  We discussed that this can be repeated every 3-4 months at the earliest.   Right knee    Orders Placed This Encounter   Procedures    Large Joint Aspiration/Injection: R knee     This order was created via procedure documentation    HME - OTHER     Order Specific Question:   Type of Equipment:     Answer:    brace right knee, offload lateral     Order Specific Question:   Height:     Answer:   5' 10" (1.778 m)     Order Specific Question:   Weight:     Answer:   77.5 kg (170 lb 14.4 oz)       Past Medical History:   Diagnosis Date    Arthritis     History of adenomatous polyp of colon     Joint pain     Pneumonia     during college     Tendonitis of ankle or foot     Trouble in sleeping     Urinary " incontinence        Past Surgical History:   Procedure Laterality Date     SECTION  2009    COLONOSCOPY N/A 2018    [facility]    COLONOSCOPY N/A 3/25/2013    Performed by [facility]iley [facility] at [facility] (4TH F[facility])    hemorrhiodectomy      tendon surgery L foot         Family History   Problem Relation Age of Onset    Heart disease Mother 78        MI    Parkinsonism Father     No Known Problems Daughter     Melanoma Neg Hx     Psoriasis Neg Hx     Lupus Neg Hx     Eczema Neg Hx     Acne Neg Hx        Social History     Socioeconomic History    Marital status:      Spouse name: Not on file    Number of children: 1    Years of education: Not on file    Highest education level: Not on file   Occupational History    Occupation: jeweler   Social Needs    Financial resource strain: Not on file    Food insecurity:     Worry: Not on file     Inability: Not on file    Transportation needs:     Medical: Not on file     Non-medical: Not on file   Tobacco Use    Smoking status: Never Smoker    Smokeless tobacco: Never Used   Substance and Sexual Activity    Alcohol use: Yes     Alcohol/week: 1.8 oz     Types: 3 Glasses of wine per week     Comment: 5oz of wine daily    Drug use: No    Sexual activity: Yes     Partners: Male   Lifestyle    Physical activity:     Days per week: Not on file     Minutes per session: Not on file    Stress: Not on file   Relationships    Social connections:     Talks on phone: Not on file     Gets together: Not on file     Attends Zoroastrianism service: Not on file     Active member of club or organization: Not on file     Attends meetings of clubs or organizations: Not on file     Relationship status: Not on file   Other Topics Concern    Are you pregnant or think you may be? No    Breast-feeding No   Social History Narrative    Biking, walking.

## 2019-10-11 ENCOUNTER — PATIENT MESSAGE (OUTPATIENT)
Dept: ORTHOPEDICS | Facility: CLINIC | Age: 74
End: 2019-10-11

## 2019-10-19 ENCOUNTER — PATIENT OUTREACH (OUTPATIENT)
Dept: ADMINISTRATIVE | Facility: OTHER | Age: 74
End: 2019-10-19

## 2019-10-28 ENCOUNTER — TELEPHONE (OUTPATIENT)
Dept: ORTHOPEDICS | Facility: CLINIC | Age: 74
End: 2019-10-28

## 2019-11-01 ENCOUNTER — OFFICE VISIT (OUTPATIENT)
Dept: DERMATOLOGY | Facility: CLINIC | Age: 74
End: 2019-11-01
Payer: MEDICARE

## 2019-11-01 DIAGNOSIS — Z12.83 SCREENING FOR SKIN CANCER: ICD-10-CM

## 2019-11-01 DIAGNOSIS — L82.1 SEBORRHEIC KERATOSES: ICD-10-CM

## 2019-11-01 DIAGNOSIS — D18.00 HEMANGIOMA, UNSPECIFIED SITE: ICD-10-CM

## 2019-11-01 DIAGNOSIS — L81.4 LENTIGO: ICD-10-CM

## 2019-11-01 DIAGNOSIS — L57.8 ACTINIC ELASTOSIS: ICD-10-CM

## 2019-11-01 DIAGNOSIS — L57.0 AK (ACTINIC KERATOSIS): Primary | ICD-10-CM

## 2019-11-01 DIAGNOSIS — D22.9 MULTIPLE BENIGN NEVI: ICD-10-CM

## 2019-11-01 PROCEDURE — 99999 PR PBB SHADOW E&M-EST. PATIENT-LVL II: CPT | Mod: PBBFAC,HCNC,, | Performed by: DERMATOLOGY

## 2019-11-01 PROCEDURE — 99214 OFFICE O/P EST MOD 30 MIN: CPT | Mod: 25,HCNC,S$GLB, | Performed by: DERMATOLOGY

## 2019-11-01 PROCEDURE — 17000 DESTRUCT PREMALG LESION: CPT | Mod: HCNC,S$GLB,, | Performed by: DERMATOLOGY

## 2019-11-01 PROCEDURE — 17000 PR DESTRUCTION(LASER SURGERY,CRYOSURGERY,CHEMOSURGERY),PREMALIGNANT LESIONS,FIRST LESION: ICD-10-PCS | Mod: HCNC,S$GLB,, | Performed by: DERMATOLOGY

## 2019-11-01 PROCEDURE — 99999 PR PBB SHADOW E&M-EST. PATIENT-LVL II: ICD-10-PCS | Mod: PBBFAC,HCNC,, | Performed by: DERMATOLOGY

## 2019-11-01 PROCEDURE — 17003 DESTRUCT PREMALG LES 2-14: CPT | Mod: HCNC,S$GLB,, | Performed by: DERMATOLOGY

## 2019-11-01 PROCEDURE — 99499 RISK ADDL DX/OHS AUDIT: ICD-10-PCS | Mod: HCNC,S$GLB,, | Performed by: DERMATOLOGY

## 2019-11-01 PROCEDURE — 99214 PR OFFICE/OUTPT VISIT, EST, LEVL IV, 30-39 MIN: ICD-10-PCS | Mod: 25,HCNC,S$GLB, | Performed by: DERMATOLOGY

## 2019-11-01 PROCEDURE — 99499 UNLISTED E&M SERVICE: CPT | Mod: HCNC,S$GLB,, | Performed by: DERMATOLOGY

## 2019-11-01 PROCEDURE — 17003 DESTRUCTION, PREMALIGNANT LESIONS; SECOND THROUGH 14 LESIONS: ICD-10-PCS | Mod: HCNC,S$GLB,, | Performed by: DERMATOLOGY

## 2019-11-01 NOTE — PROGRESS NOTES
Subjective:       Patient ID:  Aurora Castle is a 74 y.o. female who presents for   Chief Complaint   Patient presents with    Skin Check     TBSE     Patient is here today for a mole check. yes  Pt has a history of sun exposure in the past. yes  Pt recalls several blistering sunburns in the past- no  Pt has history of tanning bed use- no  Pt has  had moles removed in the past- no  Pt has history of melanoma in first degree relatives-  no  No previous hx of skin cancer      Review of Systems   Constitutional: Negative for fever, chills and fatigue.   Skin: Positive for daily sunscreen use and wears hat. Negative for recent sunburn.   Hematologic/Lymphatic: Bruises/bleeds easily.        Objective:    Physical Exam   Constitutional: She appears well-developed and well-nourished. No distress.   Neurological: She is alert and oriented to person, place, and time. She is not disoriented.   Psychiatric: She has a normal mood and affect.   Skin:   Areas Examined (abnormalities noted in diagram):   Scalp / Hair Palpated and Inspected  Head / Face Inspection Performed  Neck Inspection Performed  Chest / Axilla Inspection Performed  Abdomen Inspection Performed  Genitals / Buttocks / Groin Inspection Performed  Back Inspection Performed  RUE Inspected  LUE Inspection Performed  RLE Inspected  LLE Inspection Performed  Nails and Digits Inspection Performed                   Diagram Legend     Erythematous scaling macule/papule c/w actinic keratosis       Vascular papule c/w angioma      Pigmented verrucoid papule/plaque c/w seborrheic keratosis      Yellow umbilicated papule c/w sebaceous hyperplasia      Irregularly shaped tan macule c/w lentigo     1-2 mm smooth white papules consistent with Milia      Movable subcutaneous cyst with punctum c/w epidermal inclusion cyst      Subcutaneous movable cyst c/w pilar cyst      Firm pink to brown papule c/w dermatofibroma      Pedunculated fleshy papule(s) c/w skin tag(s)       Evenly pigmented macule c/w junctional nevus     Mildly variegated pigmented, slightly irregular-bordered macule c/w mildly atypical nevus      Flesh colored to evenly pigmented papule c/w intradermal nevus       Pink pearly papule/plaque c/w basal cell carcinoma      Erythematous hyperkeratotic cursted plaque c/w SCC      Surgical scar with no sign of skin cancer recurrence      Open and closed comedones      Inflammatory papules and pustules      Verrucoid papule consistent consistent with wart     Erythematous eczematous patches and plaques     Dystrophic onycholytic nail with subungual debris c/w onychomycosis     Umbilicated papule    Erythematous-base heme-crusted tan verrucoid plaque consistent with inflamed seborrheic keratosis     Erythematous Silvery Scaling Plaque c/w Psoriasis     See annotation      Assessment / Plan:        AK (actinic keratosis)  Cryosurgery Procedure Note    Verbal consent from the patient is obtained including, but not limited to, risk of hypopigmentation/hyperpigmentation, scar, recurrence of lesion. The patient is aware of the precancerous quality and need for treatment of these lesions. Liquid nitrogen cryosurgery is applied to the 4 actinic keratoses, as detailed in the physical exam, to produce a freeze injury. The patient is aware that blisters may form and is instructed on wound care with gentle cleansing and use of vaseline ointment to keep moist until healed. The patient is supplied a handout on cryosurgery and is instructed to call if lesions do not completely resolve.    Seborrheic keratoses  These are benign inherited growths without a malignant potential. Reassurance given to patient. No treatment is necessary.     Multiple benign nevi  TBSE body skin examination performed today including at least 12 points as noted in physical examination. No lesions suspicious for malignancy noted.  Reassurance provided.  Instructed patient to observe lesion(s) for changes and follow  up in clinic if changes are noted. Discussed ABCDE's of moles and brochure provided.      Lentigo  This is a benign hyperpigmented sun induced lesion. Daily sun protection will reduce the number of new lesions. Treatment of these benign lesions are considered cosmetic.    Hemangioma, skin  This is a benign vascular lesion. Reassurance given. No treatment required.       Screening for skin cancer  Patient instructed in importance in daily sun protection of at least spf 30. Sun avoidance and topical protection discussed.     Patient encouraged to wear hat for all outdoor exposure.     Also discussed sun protective clothing.    Actinic Elastosis/purpura  -Apply Am Lactin lotion or cream to arms and hands nightly. Available over-the counter.                 No follow-ups on file.

## 2019-11-01 NOTE — PATIENT INSTRUCTIONS
SEBORRHEIC KERATOSES        What causes seborrheic keratoses?    Seborrheic keratoses are harmless, common skin growths that first appear during adult life.  As time goes by, more growths appear.  Some persons have a very large number of them.  Seborrheic keratoses appear on both covered and uncovered parts of the body; they are not caused by sunlight.  The tendency to develop seborrheic keratoses is inherited.    Seborrheic keratoses are harmless and never become malignant.  They begin as slightly raised, light brown spots.  Gradually they thicken and take on a rough wartlike surface.  They slowly darken and may turn black.  These color changes are harmless.  Seborrheic keratoses are superficial and look as if they were stuck on the skin.  Persons who have had several seborrheic keratoses can usually recognize this type of benign growth.  However, if you are concerned or unsure about any growth, consult me.    Treatment    Seborrheic keratoses can easily be removed in the office.  The only reason for removing a seborrheic keratosis is your wish to get rid of it.      CRYOSURGERY      Your doctor has used a method called cryosurgery to treat your skin condition. Cryosurgery refers to the use of very cold substances to treat a variety of skin conditions such as warts, pre-skin cancers, molluscum contagiosum, sun spots, and several benign growths. The substance we use in cryosurgery is liquid nitrogen and is so cold (-195 degrees Celsius) that is burns when administered.     Following treatment in the office, the skin may immediately burn and become red. You may find the area around the lesion is affected as well. It is sometimes necessary to treat not only the lesion, but a small area of the surrounding normal skin to achieve a good response.     A blister, and even a blood filled blister, may form after treatment.   This is a normal response. If the blister is painful, it is acceptable to sterilize a needle and with  rubbing alcohol and gently pop the blister. It is important that you gently wash the area with soap and warm water as the blister fluid may contain wart virus if a wart was treated. Do no remove the roof of the blister.     The area treated can take anywhere from 1-3 weeks to heal. Healing time depends on the kind skin lesion treated, the location, and how aggressively the lesion was treated. It is recommended that the areas treated are covered with Vaseline or bacitracin ointment and a band-aid. If a band-aid is not practical, just ointment applied several times per day will do. Keeping these areas moist will speed the healing time.    Treatment with liquid nitrogen can leave a scar. In dark skin, it may be a light or dark scar, in light skin it may be a white or pink scar. These will generally fade with time, but may never go away completely.     If you have any concerns after your treatment, please feel free to call the office.       [address] [place] [address]/ [phone] FAX/ [email or web address]

## 2019-11-19 ENCOUNTER — OFFICE VISIT (OUTPATIENT)
Dept: ORTHOPEDICS | Facility: CLINIC | Age: 74
End: 2019-11-19
Payer: MEDICARE

## 2019-11-19 VITALS — BODY MASS INDEX: 24.27 KG/M2 | WEIGHT: 169.56 LBS | HEIGHT: 70 IN

## 2019-11-19 DIAGNOSIS — M17.11 PRIMARY OSTEOARTHRITIS OF RIGHT KNEE: Primary | ICD-10-CM

## 2019-11-19 PROCEDURE — 1159F MED LIST DOCD IN RCRD: CPT | Mod: HCNC,S$GLB,, | Performed by: ORTHOPAEDIC SURGERY

## 2019-11-19 PROCEDURE — 1101F PR PT FALLS ASSESS DOC 0-1 FALLS W/OUT INJ PAST YR: ICD-10-PCS | Mod: HCNC,CPTII,S$GLB, | Performed by: ORTHOPAEDIC SURGERY

## 2019-11-19 PROCEDURE — 1101F PT FALLS ASSESS-DOCD LE1/YR: CPT | Mod: HCNC,CPTII,S$GLB, | Performed by: ORTHOPAEDIC SURGERY

## 2019-11-19 PROCEDURE — 1126F PR PAIN SEVERITY QUANTIFIED, NO PAIN PRESENT: ICD-10-PCS | Mod: HCNC,S$GLB,, | Performed by: ORTHOPAEDIC SURGERY

## 2019-11-19 PROCEDURE — 99999 PR PBB SHADOW E&M-EST. PATIENT-LVL III: ICD-10-PCS | Mod: PBBFAC,HCNC,, | Performed by: ORTHOPAEDIC SURGERY

## 2019-11-19 PROCEDURE — 20610 LARGE JOINT ASPIRATION/INJECTION: R KNEE: ICD-10-PCS | Mod: HCNC,RT,S$GLB, | Performed by: ORTHOPAEDIC SURGERY

## 2019-11-19 PROCEDURE — 1159F PR MEDICATION LIST DOCUMENTED IN MEDICAL RECORD: ICD-10-PCS | Mod: HCNC,S$GLB,, | Performed by: ORTHOPAEDIC SURGERY

## 2019-11-19 PROCEDURE — 20610 DRAIN/INJ JOINT/BURSA W/O US: CPT | Mod: HCNC,RT,S$GLB, | Performed by: ORTHOPAEDIC SURGERY

## 2019-11-19 PROCEDURE — 99212 OFFICE O/P EST SF 10 MIN: CPT | Mod: HCNC,25,S$GLB, | Performed by: ORTHOPAEDIC SURGERY

## 2019-11-19 PROCEDURE — 1126F AMNT PAIN NOTED NONE PRSNT: CPT | Mod: HCNC,S$GLB,, | Performed by: ORTHOPAEDIC SURGERY

## 2019-11-19 PROCEDURE — 99999 PR PBB SHADOW E&M-EST. PATIENT-LVL III: CPT | Mod: PBBFAC,HCNC,, | Performed by: ORTHOPAEDIC SURGERY

## 2019-11-19 PROCEDURE — 99212 PR OFFICE/OUTPT VISIT, EST, LEVL II, 10-19 MIN: ICD-10-PCS | Mod: HCNC,25,S$GLB, | Performed by: ORTHOPAEDIC SURGERY

## 2019-11-19 RX ADMIN — TRIAMCINOLONE ACETONIDE 40 MG: 40 INJECTION, SUSPENSION INTRA-ARTICULAR; INTRAMUSCULAR at 09:11

## 2019-11-19 NOTE — PROGRESS NOTES
Subjective:     HPI:   Aurora Castle is a 74 y.o. female who presents  For repeat evaluation  Right valgus knee arthritis     we saw her 3 months ago in August.  We did a steroid injection give her a knee home exercise program.  We ordered an  brace for she says she was fitted for but never got it.      Overall she says she is doing okay the knee is  Moderate to severe symptomatic and getting progressively worse she thinks she might want to proceed with knee replacement after the 1st of the year      Objective:   Exam:    degree knee range of motion 13 degree valgus alignment      Imaging:   none today      Assessment:     Primary osteoarthritis of right knee [M17.11]   severe valgus     Plan:        she would like another steroid injection today.  We will get her refitted and reorder the  brace she would like to try that.       she can call us back if she would like to discuss timing for surgery we would need to see her back for preoperative visit otherwise we can continue non operative measures        Orders Placed This Encounter   Procedures    Large Joint Aspiration/Injection: R knee     This order was created via procedure documentation       Past Medical History:   Diagnosis Date    Arthritis     History of adenomatous polyp of colon     Joint pain     Pneumonia     during college     Tendonitis of ankle or foot     Trouble in sleeping     Urinary incontinence        Past Surgical History:   Procedure Laterality Date     SECTION  2009    COLONOSCOPY N/A 2018    Procedure: COLONOSCOPY;  Surgeon: Jim Borges MD;  Location: [facility] ([address] [place] [address]);  Service: Endoscopy;  Laterality: N/A;    hemorrhiodectomy      tendon surgery L foot         Family History   Problem Relation Age of Onset    Heart disease Mother 78        MI    Parkinsonism Father     No Known Problems Daughter     Melanoma Neg Hx     Psoriasis Neg Hx     Lupus Neg Hx     Eczema Neg Hx      Acne Neg Hx        Social History     Socioeconomic History    Marital status:      Spouse name: Not on file    Number of children: 1    Years of education: Not on file    Highest education level: Not on file   Occupational History    Occupation: jeweler   Social Needs    Financial resource strain: Not on file    Food insecurity:     Worry: Not on file     Inability: Not on file    Transportation needs:     Medical: Not on file     Non-medical: Not on file   Tobacco Use    Smoking status: Never Smoker    Smokeless tobacco: Never Used   Substance and Sexual Activity    Alcohol use: Yes     Alcohol/week: 3.0 standard drinks     Types: 3 Glasses of wine per week     Comment: 5oz of wine daily    Drug use: No    Sexual activity: Yes     Partners: Male   Lifestyle    Physical activity:     Days per week: Not on file     Minutes per session: Not on file    Stress: Not on file   Relationships    Social connections:     Talks on phone: Not on file     Gets together: Not on file     Attends Nondenominational service: Not on file     Active member of club or organization: Not on file     Attends meetings of clubs or organizations: Not on file     Relationship status: Not on file   Other Topics Concern    Are you pregnant or think you may be? No    Breast-feeding No   Social History Narrative    Biking, walking.

## 2019-11-19 NOTE — PROCEDURES
Large Joint Aspiration/Injection: R knee  Date/Time: 11/19/2019 9:00 AM  Performed by: Zack Patton III, MD  Authorized by: Zack Patton III, MD     Consent Done?:  Yes (Verbal)  Indications:  Pain  Timeout: Prior to procedure the correct patient, procedure, and site was verified    Anesthesia  Local anesthesia used  Anesthetic: lidocaine 1% without epinephrine  Anesthetic total: 4mL    Location:  Knee  Site:  R knee  Prep: Patient was prepped and draped in usual sterile fashion    Needle size:  21 G  Approach: anterior peripatellar.  Medications:  40 mg triamcinolone acetonide 40 mg/mL  Patient tolerance:  Patient tolerated the procedure well with no immediate complications

## 2019-11-21 RX ORDER — TRIAMCINOLONE ACETONIDE 40 MG/ML
40 INJECTION, SUSPENSION INTRA-ARTICULAR; INTRAMUSCULAR
Status: DISCONTINUED | OUTPATIENT
Start: 2019-11-19 | End: 2019-11-21 | Stop reason: HOSPADM

## 2019-12-02 ENCOUNTER — PATIENT MESSAGE (OUTPATIENT)
Dept: ORTHOPEDICS | Facility: CLINIC | Age: 74
End: 2019-12-02

## 2019-12-16 ENCOUNTER — PATIENT MESSAGE (OUTPATIENT)
Dept: ORTHOPEDICS | Facility: CLINIC | Age: 74
End: 2019-12-16

## 2020-02-20 ENCOUNTER — PATIENT MESSAGE (OUTPATIENT)
Dept: INTERNAL MEDICINE | Facility: CLINIC | Age: 75
End: 2020-02-20

## 2020-03-02 DIAGNOSIS — M17.11 PRIMARY OSTEOARTHRITIS OF RIGHT KNEE: Primary | ICD-10-CM

## 2020-03-05 ENCOUNTER — OFFICE VISIT (OUTPATIENT)
Dept: ORTHOPEDICS | Facility: CLINIC | Age: 75
End: 2020-03-05
Payer: MEDICARE

## 2020-03-05 ENCOUNTER — HOSPITAL ENCOUNTER (OUTPATIENT)
Dept: RADIOLOGY | Facility: HOSPITAL | Age: 75
Discharge: HOME | End: 2020-03-05
Attending: ORTHOPAEDIC SURGERY
Payer: MEDICARE

## 2020-03-05 VITALS — WEIGHT: 168 LBS | HEIGHT: 70 IN | BODY MASS INDEX: 24.05 KG/M2

## 2020-03-05 DIAGNOSIS — M17.11 PRIMARY OSTEOARTHRITIS OF RIGHT KNEE: Primary | ICD-10-CM

## 2020-03-05 DIAGNOSIS — M17.11 PRIMARY OSTEOARTHRITIS OF RIGHT KNEE: ICD-10-CM

## 2020-03-05 PROCEDURE — 99213 OFFICE O/P EST LOW 20 MIN: CPT | Mod: 25,HCNC,S$GLB, | Performed by: ORTHOPAEDIC SURGERY

## 2020-03-05 PROCEDURE — 1101F PT FALLS ASSESS-DOCD LE1/YR: CPT | Mod: HCNC,CPTII,S$GLB, | Performed by: ORTHOPAEDIC SURGERY

## 2020-03-05 PROCEDURE — 1159F PR MEDICATION LIST DOCUMENTED IN MEDICAL RECORD: ICD-10-PCS | Mod: HCNC,S$GLB,, | Performed by: ORTHOPAEDIC SURGERY

## 2020-03-05 PROCEDURE — 99999 PR PBB SHADOW E&M-EST. PATIENT-LVL III: ICD-10-PCS | Mod: PBBFAC,HCNC,, | Performed by: ORTHOPAEDIC SURGERY

## 2020-03-05 PROCEDURE — 1125F AMNT PAIN NOTED PAIN PRSNT: CPT | Mod: HCNC,S$GLB,, | Performed by: ORTHOPAEDIC SURGERY

## 2020-03-05 PROCEDURE — 73562 X-RAY EXAM OF KNEE 3: CPT | Mod: 26,HCNC,LT, | Performed by: RADIOLOGY

## 2020-03-05 PROCEDURE — 99213 PR OFFICE/OUTPT VISIT, EST, LEVL III, 20-29 MIN: ICD-10-PCS | Mod: 25,HCNC,S$GLB, | Performed by: ORTHOPAEDIC SURGERY

## 2020-03-05 PROCEDURE — 20610 DRAIN/INJ JOINT/BURSA W/O US: CPT | Mod: HCNC,RT,S$GLB, | Performed by: ORTHOPAEDIC SURGERY

## 2020-03-05 PROCEDURE — 99999 PR PBB SHADOW E&M-EST. PATIENT-LVL III: CPT | Mod: PBBFAC,HCNC,, | Performed by: ORTHOPAEDIC SURGERY

## 2020-03-05 PROCEDURE — 73562 XR KNEE ORTHO RIGHT WITH FLEXION: ICD-10-PCS | Mod: 26,HCNC,LT, | Performed by: RADIOLOGY

## 2020-03-05 PROCEDURE — 73562 X-RAY EXAM OF KNEE 3: CPT | Mod: TC,HCNC,LT

## 2020-03-05 PROCEDURE — 73564 XR KNEE ORTHO RIGHT WITH FLEXION: ICD-10-PCS | Mod: 26,HCNC,RT, | Performed by: RADIOLOGY

## 2020-03-05 PROCEDURE — 1125F PR PAIN SEVERITY QUANTIFIED, PAIN PRESENT: ICD-10-PCS | Mod: HCNC,S$GLB,, | Performed by: ORTHOPAEDIC SURGERY

## 2020-03-05 PROCEDURE — 1101F PR PT FALLS ASSESS DOC 0-1 FALLS W/OUT INJ PAST YR: ICD-10-PCS | Mod: HCNC,CPTII,S$GLB, | Performed by: ORTHOPAEDIC SURGERY

## 2020-03-05 PROCEDURE — 20610 LARGE JOINT ASPIRATION/INJECTION: R KNEE: ICD-10-PCS | Mod: HCNC,RT,S$GLB, | Performed by: ORTHOPAEDIC SURGERY

## 2020-03-05 PROCEDURE — 1159F MED LIST DOCD IN RCRD: CPT | Mod: HCNC,S$GLB,, | Performed by: ORTHOPAEDIC SURGERY

## 2020-03-05 PROCEDURE — 73564 X-RAY EXAM KNEE 4 OR MORE: CPT | Mod: 26,HCNC,RT, | Performed by: RADIOLOGY

## 2020-03-05 RX ORDER — TRIAMCINOLONE ACETONIDE 40 MG/ML
40 INJECTION, SUSPENSION INTRA-ARTICULAR; INTRAMUSCULAR
Status: DISCONTINUED | OUTPATIENT
Start: 2020-03-05 | End: 2020-03-08 | Stop reason: HOSPADM

## 2020-03-05 RX ADMIN — TRIAMCINOLONE ACETONIDE 40 MG: 40 INJECTION, SUSPENSION INTRA-ARTICULAR; INTRAMUSCULAR at 11:03

## 2020-03-05 NOTE — PROGRESS NOTES
Subjective:     HPI:   Aurora Castle is a 74 y.o. 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  It is limiting her walking and activities of daily living.  She has failed non operative management.    To review:   She saw  in 2015 and had a steroid injection.  She saw Dr. [facility] in 2016 who did 2 steroid injections.  She has seen Alex in 2016 in 2017 who did a Synvisc-One injection in that around 3 Synvisc injections.      She complains of global moderate to severe activity-related right knee pain. No groin anterior thigh radicular pain or paresthesias     She takes Aleve 2 tablets twice a day that is better than the meloxicam she has been on the past.  She also is on glucosamine and CVD or oil.  Steroid in synthetic injections described above.  She has done home exercise conditioning program that she printed out from the Internet for his knee arthritis. She wears a brace if she is going to be standing for prolonged periods of time. No assistive devices.  She can walk about a miles she rides her bike 3 miles a day.  She says she has difficulty when she 1st gets up after sitting she feels like she limps when she walks but once she gets going she does okay     She is a .  She has a  at home     No significant complicating medical history     Objective:   Body mass index is 24.1 kg/m².  Exam:    Antalgic gait with a limp favoring the right knee.  degree knee range of motion 25° valgus alignment she opens to valgus stress with a soft endpoint she does not correct to neutral.  She has got some  tenderness to the medial, patellofemoral, and lateral joint line  there is a moderate effusion, significant flexion contracture but no extensor lag, knee stable to varus anterior-posterior stresses    Imaging:  Indication:  Right knee pain  Exam Ordered: Radiographs of the right knee include a standing anteroposterior view, a standing posterioanterior view, a lateral view in full flexion, and a sunrise view  Details of Examination: Todays exam shows evidence of joint space narrowing, osteophyte formation, and subchondral sclerosis, all consistent with degenerative arthritis of the knee.  No other significant findings are noted.  Impression:  Degenerative Arthritis, Right Knee  Severe grade 4 valgus      Assessment:     Primary osteoarthritis of right knee [M17.11]       Plan:       We will do a right knee steroid injection today.    Patient would like to proceed with a right total knee replacement July 15th    This patient has significant symptoms in their knee that are affecting their quality of life and daily activities.  They have tried non-operative treatment including analgesics, an exercise program, and activity modification, but the symptoms have persisted. I believe they make a good candidate for knee arthroplasty.     The risks and benefits of knee arthroplasty have been discussed with the patient which include, but are not limited to infections (including severe sequelae), potential component failure, fracture, DVT, pulmonary embolus, nerve palsy, poor range of motion, the possibility of bone grafting, persistent pain, wound healing complications, transfusions, medical complications and death.     Pre-operative planning will include the following:  A pre-surgical evaluation by will be arranged.  Pre-op orders will be placed.  We will make arrangements with the operating room for proper time and staffing.  We will make Social Service arrangements for the patient.    Implants:   Company: [employer]  System: [employer]    DVT prophylaxis: ASA 81mg BID x1 month  Dispo: outpatient  PT    Admission status: Outpatient/23hr OBS                  Location: [facility]                            Will plan on a right total knee replacement July 15, 2020 and [facility] [place].  Severe valgus deformity: MBT/TC3 backup            Orders Placed This Encounter   Procedures    Large Joint Aspiration/Injection: R knee     This order was created via procedure documentation       Medications Ordered This Encounter   Medications    triamcinolone acetonide injection 40 mg        Past Medical History:   Diagnosis Date    Arthritis     History of adenomatous polyp of colon     Joint pain     Pneumonia     during college     Tendonitis of ankle or foot     Trouble in sleeping     Urinary incontinence        Past Surgical History:   Procedure Laterality Date     SECTION  2009    COLONOSCOPY N/A 2018    Procedure: COLONOSCOPY;  Surgeon: Jim Borges MD;  Location: [facility] ([address] [place] [address]);  Service: Endoscopy;  Laterality: N/A;    hemorrhiodectomy      tendon surgery L foot         Family History   Problem Relation Age of Onset    Heart disease Mother 78        MI    Parkinsonism Father     No Known Problems Daughter     Melanoma Neg Hx     Psoriasis Neg Hx     Lupus Neg Hx     Eczema Neg Hx     Acne Neg Hx        Social History     Socioeconomic History    Marital status:      Spouse name: Not on file    Number of children: 1    Years of education: Not on file    Highest education level: Not on file   Occupational History    Occupation: jeweler   Social Needs    Financial resource strain: Not on file    Food insecurity:     Worry: Not on file     Inability: Not on file    Transportation needs:     Medical: Not on file     Non-medical: Not on file   Tobacco Use    Smoking status: Never Smoker    Smokeless tobacco: Never Used   Substance and Sexual Activity    Alcohol use: Yes     Alcohol/week: 3.0 standard drinks     Types: 3 Glasses of wine per week     Comment: 5oz of wine  daily    Drug use: No    Sexual activity: Yes     Partners: Male   Lifestyle    Physical activity:     Days per week: Not on file     Minutes per session: Not on file    Stress: Not on file   Relationships    Social connections:     Talks on phone: Not on file     Gets together: Not on file     Attends [employer]ist service: Not on file     Active member of club or organization: Not on file     Attends meetings of clubs or organizations: Not on file     Relationship status: Not on file   Other Topics Concern    Are you pregnant or think you may be? No    Breast-feeding No   Social History Narrative    Biking, walking.

## 2020-03-05 NOTE — PROCEDURES
Large Joint Aspiration/Injection: R knee  Performed by: Zack Patton III, MD  Authorized by: Zack Patton III, MD  Date/Time: 3/5/2020 11:20 AM    Consent Done?:  Yes (Verbal)  Indications:  pain  Timeout: Immediately prior to procedure a time out was called to verify the correct patient, procedure, equipment, support staff and site/side marked as required.  Prep:Patient was prepped and draped in the usual sterile fashion.        Anesthesia  Local anesthesia used  Anesthetic: lidocaine 1% without epinephrine    Details:   Needle size: 21 G    Medications: 40 mg triamcinolone acetonide 40 mg/mL  Patient tolerance:  patient tolerated the procedure well with no immediate complications

## 2020-04-03 ENCOUNTER — TELEPHONE (OUTPATIENT)
Dept: ORTHOPEDICS | Facility: CLINIC | Age: 75
End: 2020-04-03

## 2020-04-13 ENCOUNTER — PATIENT MESSAGE (OUTPATIENT)
Dept: DERMATOLOGY | Facility: CLINIC | Age: 75
End: 2020-04-13

## 2020-04-14 ENCOUNTER — PATIENT MESSAGE (OUTPATIENT)
Dept: DERMATOLOGY | Facility: CLINIC | Age: 75
End: 2020-04-14

## 2020-04-17 ENCOUNTER — PATIENT MESSAGE (OUTPATIENT)
Dept: DERMATOLOGY | Facility: CLINIC | Age: 75
End: 2020-04-17

## 2020-04-20 ENCOUNTER — OFFICE VISIT (OUTPATIENT)
Dept: DERMATOLOGY | Facility: CLINIC | Age: 75
End: 2020-04-20
Payer: MEDICARE

## 2020-04-20 DIAGNOSIS — D69.2 SENILE PURPURA: Primary | ICD-10-CM

## 2020-04-20 PROCEDURE — 1159F PR MEDICATION LIST DOCUMENTED IN MEDICAL RECORD: ICD-10-PCS | Mod: HCNC,95,, | Performed by: DERMATOLOGY

## 2020-04-20 PROCEDURE — 1101F PR PT FALLS ASSESS DOC 0-1 FALLS W/OUT INJ PAST YR: ICD-10-PCS | Mod: HCNC,CPTII,95, | Performed by: DERMATOLOGY

## 2020-04-20 PROCEDURE — 99499 RISK ADDL DX/OHS AUDIT: ICD-10-PCS | Mod: HCNC,95,, | Performed by: DERMATOLOGY

## 2020-04-20 PROCEDURE — 99213 PR OFFICE/OUTPT VISIT, EST, LEVL III, 20-29 MIN: ICD-10-PCS | Mod: HCNC,95,, | Performed by: DERMATOLOGY

## 2020-04-20 PROCEDURE — 1159F MED LIST DOCD IN RCRD: CPT | Mod: HCNC,95,, | Performed by: DERMATOLOGY

## 2020-04-20 PROCEDURE — 99213 OFFICE O/P EST LOW 20 MIN: CPT | Mod: HCNC,95,, | Performed by: DERMATOLOGY

## 2020-04-20 PROCEDURE — 1101F PT FALLS ASSESS-DOCD LE1/YR: CPT | Mod: HCNC,CPTII,95, | Performed by: DERMATOLOGY

## 2020-04-20 PROCEDURE — 99499 UNLISTED E&M SERVICE: CPT | Mod: HCNC,95,, | Performed by: DERMATOLOGY

## 2020-04-20 NOTE — PROGRESS NOTES
TELEMEDICINE DERMATOLOGY CONSULTATION  The patient location is:home  The chief complaint leading to consultation is:  Bump on arm    Total time spent with patient: 10 minutes  Each patient to whom he or she provides medical services by telemedicine is:  (1) informed of the relationship between the physician and patient and the respective role of any other health care provider with respect to management of the patient; and (2) notified that he or she may decline to receive medical services by telemedicine and may withdraw from such care at any time.      Subjective:       Patient ID:  Aurora Castle is a 74 y.o. female who presents for spot on arm not going away.    HPI  75 yo female, no history of skin cancer, here for evaluation of a spot  On the right forearm.  She noticed it over the past month.   It is not hurting, changing, growing, itching, or bleeding.  She has covered  It with a  Bandaid.  She does bruise easily but has not had a bruise like this in the past.  She works outside in the yard, does not wear long sleeves.  No known trauma to the site.  Takes fish oil and other vitamins.    Review of Systems   Skin: Negative for itching and rash.   Hematologic/Lymphatic: Bruises/bleeds easily.        Objective:    Physical Exam   Constitutional: She appears well-developed and well-nourished. No distress.   Neurological: She is alert and oriented to person, place, and time. She is not disoriented.   Psychiatric: She has a normal mood and affect.   Skin:   Areas Examined (abnormalities noted in diagram):   RUE Inspected              Diagram Legend     Erythematous scaling macule/papule c/w actinic keratosis       Vascular papule c/w angioma      Pigmented verrucoid papule/plaque c/w seborrheic keratosis      Yellow umbilicated papule c/w sebaceous hyperplasia      Irregularly shaped tan macule c/w lentigo     1-2 mm smooth white papules consistent with Milia      Movable subcutaneous cyst with punctum c/w  epidermal inclusion cyst      Subcutaneous movable cyst c/w pilar cyst      Firm pink to brown papule c/w dermatofibroma      Pedunculated fleshy papule(s) c/w skin tag(s)      Evenly pigmented macule c/w junctional nevus     Mildly variegated pigmented, slightly irregular-bordered macule c/w mildly atypical nevus      Flesh colored to evenly pigmented papule c/w intradermal nevus       Pink pearly papule/plaque c/w basal cell carcinoma      Erythematous hyperkeratotic cursted plaque c/w SCC      Surgical scar with no sign of skin cancer recurrence      Open and closed comedones      Inflammatory papules and pustules      Verrucoid papule consistent consistent with wart     Erythematous eczematous patches and plaques     Dystrophic onycholytic nail with subungual debris c/w onychomycosis     Umbilicated papule    Erythematous-base heme-crusted tan verrucoid plaque consistent with inflamed seborrheic keratosis     Erythematous Silvery Scaling Plaque c/w Psoriasis     See annotation                Last cbc 6/2019 normal platelets    Assessment / Plan:        Senile purpura    Reassured  Discussed sun protection, wearing long sleeves when doing yard work  OTC supplements/vitamins can sometimes worsen  Can use Arnika cream OTC  Laser prn in future if does not resorb           Follow up if symptoms worsen or fail to improve.

## 2020-04-23 ENCOUNTER — OFFICE VISIT (OUTPATIENT)
Dept: INTERNAL MEDICINE | Facility: CLINIC | Age: 75
End: 2020-04-23
Payer: MEDICARE

## 2020-04-23 DIAGNOSIS — R60.9 EDEMA, UNSPECIFIED TYPE: ICD-10-CM

## 2020-04-23 DIAGNOSIS — G47.00 INSOMNIA, UNSPECIFIED TYPE: ICD-10-CM

## 2020-04-23 DIAGNOSIS — M17.11 PRIMARY OSTEOARTHRITIS OF RIGHT KNEE: ICD-10-CM

## 2020-04-23 DIAGNOSIS — Z01.818 PREOP EXAMINATION: Primary | ICD-10-CM

## 2020-04-23 PROCEDURE — 1159F MED LIST DOCD IN RCRD: CPT | Mod: HCNC,95,, | Performed by: HOSPITALIST

## 2020-04-23 PROCEDURE — 1101F PR PT FALLS ASSESS DOC 0-1 FALLS W/OUT INJ PAST YR: ICD-10-PCS | Mod: HCNC,CPTII,95, | Performed by: HOSPITALIST

## 2020-04-23 PROCEDURE — 1159F PR MEDICATION LIST DOCUMENTED IN MEDICAL RECORD: ICD-10-PCS | Mod: HCNC,95,, | Performed by: HOSPITALIST

## 2020-04-23 PROCEDURE — 1101F PT FALLS ASSESS-DOCD LE1/YR: CPT | Mod: HCNC,CPTII,95, | Performed by: HOSPITALIST

## 2020-04-23 PROCEDURE — 99213 PR OFFICE/OUTPT VISIT, EST, LEVL III, 20-29 MIN: ICD-10-PCS | Mod: HCNC,95,, | Performed by: HOSPITALIST

## 2020-04-23 PROCEDURE — 99213 OFFICE O/P EST LOW 20 MIN: CPT | Mod: HCNC,95,, | Performed by: HOSPITALIST

## 2020-04-23 RX ORDER — NAPROXEN SODIUM 220 MG
440 TABLET ORAL 2 TIMES DAILY WITH MEALS
COMMUNITY
End: 2021-04-13

## 2020-04-23 NOTE — ASSESSMENT & PLAN NOTE
Naproxen related   Does not eat excess salt   Edema- I suggested avoidance of added salt,avoidance of NSAID's, unless advised or ordered  and suggested Limb elevation and lei hose use

## 2020-04-23 NOTE — PROGRESS NOTES
"Virtual pre op evaluation   Video, Audio   location of the patient- Home  Consent obtained for virtual evaluation     Each patient to whom he or she provides medical services by telemedicine is:  (1) informed of the relationship between the physician and patient and the respective role of any other health care provider with respect to management of the patient; and (2) notified that he or she may decline to receive medical services by telemedicine and may withdraw from such care at any time.    Chief complaint-Preoperative evaluation , Perioperative Medical management, complication reduction plan     Date of Evaluation- 04/23/2020          History of present illness- I had the pleasure of evaluating  this pleasant 74 y.o. lady i prior to her elective Orthopedic surgery. The patient is new to me .  Goes by " Susan "  I have obtained the history by speaking to the patient and by reviewing the electronic health records.    Events leading up to surgery / History of presenting illness -    Rt knee replacement     She has been troubled with moderate-severe  Right knee  pain for the past 4 years  . Pain increases with activity, standing up on it  , some times at rest also , and decreases with 2  Aleves twice a day  and resting.  No previous Rt knee surgeries    Relevant health conditions of significance for the perioperative period/ History of presenting illness -    Subjectively describes health as' very good    Health conditions of significance for the perioperative period      Works as a  - sells in line , at [employer] , whole sale    Lives with  and the dog  2 story house - Bed room upstairs   Both of them works from home   Works down stairs   Help available post op -     Stays active   Rides her bicycle 45 minutes most days   Does exercises   100 sit ups every day   Takers stairs     Not known to have heart disease , Diabetes Mellitus, Lung disease , HTN, DVT ,PE    Active cardiac " conditions- none    Revised cardiac risk index predictors- None     Functional capacity -Examples of physical activity rides her bicycle   grows vegetables ,  house work and  can take 1 flight of stairs----- She can undertake all the above activities without  chest pain,chest tightness, Shortness of breath ,dizziness,lightheadedness making her exercise tolerance more,   than 4 Mets.       Review of symptoms    ROS    Constitutional - No significant weight changes ,No fever, no chills  Eyes- No new vision changes  ENT- STOP BANG - age over 50  There is no height or weight on file to calculate BMI.  Cardiac-As above   Respiratory- No cough, expectoration and  no hemoptysis  GI- Bowel movements  regular  No overt GI/ blood losses   Menopause 50's  -No dysuria and  no urinary hesitancy   MS-As above/ No muscle,Joint pain  Neurologic-No unilateral weakness   Psychiatric- No depression,Anxiety and  no suicidal, homicidal ideation   Endocrine-  Prednisone use for over 3 weeks -no  Hematological/Lymphatic-No spontaneous bruising, bleeding    Past Medical history- reviewed in Epic-    Pertinent negatives-   DVT  Pulmonary embolism  Vascular stenting     Past Surgical history - reviewed in EPIC    No Anaesthetic,Bleeding ,Cardiac problems with previous surgeries  No history of post op delirium   No history of post operative  nausea, vomiting     Family history- reviewed in [facility]    FH- No anesthesia,bleeding / venous thrombosis ,problems  family     Social history- reviewed in EPIC      Help available post op     Medications and Allergies - reviewed in EPIC    Exam    Physical Exam  General appearance-Conscious,Coherent  Eyes- No conjunctival icterus  ENT-Oral cavity- moist  , Hearing grossly normal   Respiratory - No audible wheeze and Normal Respiratory Effort   Peripheral pitting pedal edema-- mild, no calf pain   Musculoskeletal- No finger Clubbing.  Psychiatric - normal effect,Orientation    Investigations    Review  of Medicine tests      Review of clinical lab tests:  Lab Results   Component Value Date    CREATININE 0.7 06/11/2019    HGB 12.9 06/11/2019     06/11/2019         Review of old records- Was done and information gathered regards to events leading to surgery and health conditions of significance in the perioperative period         Assessment and plan    New problems to me      Preoperative  risk assessment    Cardiac    Revised cardiac risk index ( RCRI ) predictors-0 ---.Functional capacity  Is more than 4 Mets. She will be undergoing a Orthopedic procedure that carries a intermediate risk     Risk of a major Cardiac event ( Defined as death, myocardial infarction, or cardiac arrest at 30 days after noncardiac surgery), based on RCRI score     -3.9%       No further cardiac work up is indicated prior to proceeding with the surgery          Perioperative Medical management / Optimization    Osteoarthritis     For replacement     Insomnia    Going through Menopause  Rt knee pain contributing      Edema    Naproxen related   Does not eat excess salt   Edema- I suggested avoidance of added salt,avoidance of NSAID's, unless advised or ordered  and suggested Limb elevation and lei hose use        Preventive perioperative care    Thromboembolic prophylaxis:  Her risk factors for thrombosis include surgical procedure and age.I suggest  thromboembolic prophylaxis.I suggested being active in the post operative period.   The patient is a candidate for extended DVT prophylaxis    Postoperative pulmonary complication prophylaxis-Risk factors for post operative pulmonary complications include  age over 65  years - I suggest incentive Spirometry use ,  early ambulation ,  end tidal carbon dioxide  Monitoring ,  oral care  and  head end of bed elevation      Renal complication prophylaxis- . I suggest keeping her well hydrated a in the perioperative period.    Surgical site Infection Prophylaxis-I  suggest appropriate  antibiotic for Prophylaxis against Surgical site infections    In view of Regional anesthesia  the patient  is at risk of postoperative urinary retention.  I suggest avoidance / minimizing the of  Benzodiazepines,Anticholinergic medication,antihistamines ( Benadryl) , if possible in the perioperative period. I suggest using the minimum possible use of opioids for the minimum period of time in the perioperative period. Benadryl avoidance suggested     This visit was focussed on Preoperative evaluation , Perioperative Medical management, complication reduction plans and I suggest that the patient follows up with the primary care ,relevant sub specialists for on going health care      I appreciate the opportunity to be involved in this  pleasant  lady's care.Please feel free to contact me if there were any questions about this consultation     Patient is Medically  optimized   for the above planned surgery/ procedure-Pending examination     Patient/ care giver/ Family member was instructed to call and update me about any changes to health,  medication, office visits ,testing out side of the [facility] , hospitalizations between now and surgery      Dr JANIE Stafford MD MRCP ( ),[facility]   [facility]  [facility]   Cell [phone]     Aleve - 4 years - Renal, Ulcer effects discussed     COVID symptoms     No fever   No cough , sore throat  No loss of taste or smell  No muscle aches   No diarrhea   No nausea, vomiting   No SOB

## 2020-04-26 ENCOUNTER — PATIENT MESSAGE (OUTPATIENT)
Dept: ORTHOPEDICS | Facility: CLINIC | Age: 75
End: 2020-04-26

## 2020-04-27 ENCOUNTER — PATIENT MESSAGE (OUTPATIENT)
Dept: ORTHOPEDICS | Facility: CLINIC | Age: 75
End: 2020-04-27

## 2020-05-13 ENCOUNTER — TELEPHONE (OUTPATIENT)
Dept: ORTHOPEDICS | Facility: CLINIC | Age: 75
End: 2020-05-13

## 2020-05-13 DIAGNOSIS — M17.11 PRIMARY OSTEOARTHRITIS OF RIGHT KNEE: Primary | ICD-10-CM

## 2020-05-13 NOTE — TELEPHONE ENCOUNTER
I called this patient regarding her surgery. She stated that her vacation plans have been cancelled and is willing to have surgery in the middle of June. I told her I would talk to Dr. Patton and then will touch base with her. The patient verbalized understanding and has no further questions.

## 2020-05-14 ENCOUNTER — TELEPHONE (OUTPATIENT)
Dept: ORTHOPEDICS | Facility: CLINIC | Age: 75
End: 2020-05-14

## 2020-05-14 DIAGNOSIS — Z01.818 PRE-OP TESTING: Primary | ICD-10-CM

## 2020-05-14 NOTE — TELEPHONE ENCOUNTER
I called the patient about rescheduling her surgery. I gave her a potential surgery date of 6/8/2020. The patient wants to talk it over with her  and then will call back to confirm.

## 2020-05-14 NOTE — TELEPHONE ENCOUNTER
I called the patient regarding scheduling appointments for surgery. The patient is scheduled for surgery with Dr. Patton on 06/08/2020.     The patient is scheduled for the Joint Class on 06/02/2020.     The patient has a pre-op appointment with Emily Márquez on 05/18/2020.     The patient has a post-op appointment with Emily Márquez on 06/22/2020.     The patient is scheduled for the COVID-19 swab test for pre-op testing on 06/06/2020 at the [facility] (across from the [facility] on [facility]).     The patient understands the locations for these appointments. The patient has no further questions.

## 2020-05-18 ENCOUNTER — OFFICE VISIT (OUTPATIENT)
Dept: ORTHOPEDICS | Facility: CLINIC | Age: 75
End: 2020-05-18
Payer: MEDICARE

## 2020-05-18 ENCOUNTER — HOSPITAL ENCOUNTER (OUTPATIENT)
Dept: RADIOLOGY | Facility: HOSPITAL | Age: 75
Discharge: HOME | End: 2020-05-18
Attending: PHYSICIAN ASSISTANT
Payer: MEDICARE

## 2020-05-18 VITALS
SYSTOLIC BLOOD PRESSURE: 131 MMHG | DIASTOLIC BLOOD PRESSURE: 74 MMHG | HEART RATE: 62 BPM | TEMPERATURE: 97 F | WEIGHT: 174.94 LBS | BODY MASS INDEX: 25.04 KG/M2 | HEIGHT: 70 IN

## 2020-05-18 DIAGNOSIS — M17.11 PRIMARY OSTEOARTHRITIS OF RIGHT KNEE: ICD-10-CM

## 2020-05-18 DIAGNOSIS — M17.11 PRIMARY OSTEOARTHRITIS OF RIGHT KNEE: Primary | ICD-10-CM

## 2020-05-18 DIAGNOSIS — Z96.651 STATUS POST TOTAL RIGHT KNEE REPLACEMENT: ICD-10-CM

## 2020-05-18 PROCEDURE — 73560 X-RAY EXAM OF KNEE 1 OR 2: CPT | Mod: 26,HCNC,RT, | Performed by: RADIOLOGY

## 2020-05-18 PROCEDURE — 99999 PR PBB SHADOW E&M-EST. PATIENT-LVL IV: ICD-10-PCS | Mod: PBBFAC,HCNC,, | Performed by: PHYSICIAN ASSISTANT

## 2020-05-18 PROCEDURE — 73560 X-RAY EXAM OF KNEE 1 OR 2: CPT | Mod: TC,HCNC,RT

## 2020-05-18 PROCEDURE — 99499 NO LOS: ICD-10-PCS | Mod: HCNC,S$GLB,, | Performed by: PHYSICIAN ASSISTANT

## 2020-05-18 PROCEDURE — 73560 XR KNEE 1 OR 2 VIEW RIGHT: ICD-10-PCS | Mod: 26,HCNC,RT, | Performed by: RADIOLOGY

## 2020-05-18 PROCEDURE — 99999 PR PBB SHADOW E&M-EST. PATIENT-LVL IV: CPT | Mod: PBBFAC,HCNC,, | Performed by: PHYSICIAN ASSISTANT

## 2020-05-18 PROCEDURE — 99499 UNLISTED E&M SERVICE: CPT | Mod: HCNC,S$GLB,, | Performed by: PHYSICIAN ASSISTANT

## 2020-05-18 RX ORDER — FENTANYL CITRATE 50 UG/ML
25 INJECTION, SOLUTION INTRAMUSCULAR; INTRAVENOUS EVERY 5 MIN PRN
Status: CANCELLED | OUTPATIENT
Start: 2020-05-18

## 2020-05-18 RX ORDER — ONDANSETRON 2 MG/ML
4 INJECTION INTRAMUSCULAR; INTRAVENOUS EVERY 8 HOURS PRN
Status: CANCELLED | OUTPATIENT
Start: 2020-05-18

## 2020-05-18 RX ORDER — MUPIROCIN 20 MG/G
1 OINTMENT TOPICAL
Status: CANCELLED | OUTPATIENT
Start: 2020-05-18

## 2020-05-18 RX ORDER — TALC
6 POWDER (GRAM) TOPICAL NIGHTLY PRN
Status: CANCELLED | OUTPATIENT
Start: 2020-05-18

## 2020-05-18 RX ORDER — NALOXONE HCL 0.4 MG/ML
0.02 VIAL (ML) INJECTION
Status: CANCELLED | OUTPATIENT
Start: 2020-05-18

## 2020-05-18 RX ORDER — CELECOXIB 100 MG/1
200 CAPSULE ORAL DAILY
Status: CANCELLED | OUTPATIENT
Start: 2020-05-18

## 2020-05-18 RX ORDER — OXYCODONE HYDROCHLORIDE 5 MG/1
5 TABLET ORAL
Status: CANCELLED | OUTPATIENT
Start: 2020-05-18

## 2020-05-18 RX ORDER — AMOXICILLIN 250 MG
1 CAPSULE ORAL 2 TIMES DAILY
Status: CANCELLED | OUTPATIENT
Start: 2020-05-18

## 2020-05-18 RX ORDER — LIDOCAINE HYDROCHLORIDE 10 MG/ML
1 INJECTION, SOLUTION EPIDURAL; INFILTRATION; INTRACAUDAL; PERINEURAL
Status: CANCELLED | OUTPATIENT
Start: 2020-05-18

## 2020-05-18 RX ORDER — PREGABALIN 25 MG/1
75 CAPSULE ORAL
Status: CANCELLED | OUTPATIENT
Start: 2020-05-18

## 2020-05-18 RX ORDER — SODIUM CHLORIDE 9 MG/ML
INJECTION, SOLUTION INTRAVENOUS
Status: CANCELLED | OUTPATIENT
Start: 2020-05-18

## 2020-05-18 RX ORDER — BISACODYL 10 MG
10 SUPPOSITORY, RECTAL RECTAL EVERY 12 HOURS PRN
Status: CANCELLED | OUTPATIENT
Start: 2020-05-18

## 2020-05-18 RX ORDER — MUPIROCIN 20 MG/G
1 OINTMENT TOPICAL 2 TIMES DAILY
Status: CANCELLED | OUTPATIENT
Start: 2020-05-18 | End: 2020-05-23

## 2020-05-18 RX ORDER — ASPIRIN 81 MG/1
81 TABLET ORAL 2 TIMES DAILY
Status: CANCELLED | OUTPATIENT
Start: 2020-05-18

## 2020-05-18 RX ORDER — PREGABALIN 25 MG/1
75 CAPSULE ORAL NIGHTLY
Status: CANCELLED | OUTPATIENT
Start: 2020-05-18

## 2020-05-18 RX ORDER — MORPHINE SULFATE 10 MG/ML
2 INJECTION, SOLUTION INTRAMUSCULAR; INTRAVENOUS
Status: CANCELLED | OUTPATIENT
Start: 2020-05-18

## 2020-05-18 RX ORDER — CELECOXIB 100 MG/1
400 CAPSULE ORAL
Status: CANCELLED | OUTPATIENT
Start: 2020-05-18

## 2020-05-18 RX ORDER — MIDAZOLAM HYDROCHLORIDE 1 MG/ML
1 INJECTION INTRAMUSCULAR; INTRAVENOUS EVERY 5 MIN PRN
Status: CANCELLED | OUTPATIENT
Start: 2020-05-18

## 2020-05-18 RX ORDER — OXYCODONE HYDROCHLORIDE 5 MG/1
10 TABLET ORAL
Status: CANCELLED | OUTPATIENT
Start: 2020-05-18

## 2020-05-18 RX ORDER — FAMOTIDINE 20 MG/1
20 TABLET, FILM COATED ORAL 2 TIMES DAILY
Status: CANCELLED | OUTPATIENT
Start: 2020-05-18

## 2020-05-18 RX ORDER — ACETAMINOPHEN 500 MG
1000 TABLET ORAL EVERY 6 HOURS
Status: CANCELLED | OUTPATIENT
Start: 2020-05-18 | End: 2020-05-20

## 2020-05-18 RX ORDER — POLYETHYLENE GLYCOL 3350 17 G/17G
17 POWDER, FOR SOLUTION ORAL DAILY
Status: CANCELLED | OUTPATIENT
Start: 2020-05-18

## 2020-05-18 RX ORDER — SODIUM CHLORIDE 0.9 % (FLUSH) 0.9 %
10 SYRINGE (ML) INJECTION
Status: CANCELLED | OUTPATIENT
Start: 2020-05-18

## 2020-05-18 RX ORDER — ROPIVACAINE HYDROCHLORIDE 2 MG/ML
8 INJECTION, SOLUTION EPIDURAL; INFILTRATION; PERINEURAL CONTINUOUS
Status: CANCELLED | OUTPATIENT
Start: 2020-05-18

## 2020-05-18 RX ORDER — SODIUM CHLORIDE 9 MG/ML
INJECTION, SOLUTION INTRAVENOUS CONTINUOUS
Status: CANCELLED | OUTPATIENT
Start: 2020-05-18 | End: 2020-05-19

## 2020-05-18 NOTE — H&P (VIEW-ONLY)
CC: Right knee pain    Aurora Castle is a 74 y.o. female with 5 year history of Right knee pain. Pain is worse with activity and weight bearing.  Patient has experienced interference of activities of daily living due to decreased range of motion and an increase in joint pain and swelling.  Patient has failed non-operative treatment including NSAIDs, corticosteroid injections, viscosupplement injections, and activity modification.  Aurora Castle currently ambulates independently.     Relevant medical conditions of significance in perioperative period:  None    Past Medical History:   Diagnosis Date    Arthritis     History of adenomatous polyp of colon     Joint pain     Pneumonia     during [facility]     Tendonitis of ankle or foot     Trouble in sleeping     Urinary incontinence        Past Surgical History:   Procedure Laterality Date     SECTION  2009    COLONOSCOPY N/A 2018    Procedure: COLONOSCOPY;  Surgeon: Jim Borges MD;  [facility] ([address] [place] [address]);  Service: Endoscopy;  Laterality: N/A;    hemorrhiodectomy      tendon surgery L foot         Family History   Problem Relation Age of Onset    Heart disease Mother 78        MI    Parkinsonism Father     No Known Problems Daughter     Melanoma Neg Hx     Psoriasis Neg Hx     Lupus Neg Hx     Eczema Neg Hx     Acne Neg Hx        Review of patient's allergies indicates:   Allergen Reactions    Erythromycin Itching and Swelling     Eye swelling   Local eye ointment         Current Outpatient Medications:     ascorbic acid (VITAMIN C) 1000 MG tablet, Take 1 tablet by mouth Daily., Disp: , Rfl:     BIOTIN ORAL, Take 5,000 mcg by mouth once daily., Disp: , Rfl:     cholecalciferol, vitamin D3, (VITAMIN D3) 5,000 unit Tab, Take 1 tablet by mouth once daily., Disp: , Rfl:     doxylamine succinate (UNISOM, DOXYLAMINE, ORAL), Take by mouth every evening., Disp: , Rfl:     fish oil-omega-3 fatty acids 300-1,000 mg  "capsule, Take 2 g by mouth once daily., Disp: , Rfl:     glucosamine-chondroit-vit C-Mn (GLUCOSAMINE-CHONDROITIN COMPLX) 134-532-83-2.5 mg Cap, Take 1 capsule by mouth once daily. , Disp: , Rfl:     MELATONIN ORAL, Take by mouth every evening. , Disp: , Rfl:     MULTIVITAMIN WITH MINERALS (MULTIVITAMIN & MINERAL FORMULA) Tab, Take 1 tablet by mouth Daily.   'iris-lert', Disp: , Rfl:     naproxen sodium (ANAPROX) 220 MG tablet, Take 440 mg by mouth 2 (two) times daily with meals., Disp: , Rfl:     ciclopirox (LOPROX) 0.77 % Susp, Apply topically 2 (two) times daily. (Patient not taking: Reported on 5/18/2020), Disp: 30 mL, Rfl: 2    VIT C/VIT E/LUTEIN/MIN/OMEGA-3 (OCUVITE ORAL), Take 1 tablet by mouth once daily., Disp: , Rfl:     Review of Systems:  Constitutional: no fever or chills  Eyes: no visual changes  ENT: no nasal congestion or sore throat  Respiratory: no cough or shortness of breath  Cardiovascular: no chest pain or palpitations  Gastrointestinal: no nausea or vomiting, tolerating diet  Genitourinary: no hematuria or dysuria  Integument/Breast: no rash or pruritis  Hematologic/Lymphatic: no easy bruising or lymphadenopathy  Musculoskeletal: positive for knee pain  Neurological: no seizures or tremors  Behavioral/Psych: no auditory or visual hallucinations  Endocrine: no heat or cold intolerance    PE:  /74 (BP Location: Left arm, Patient Position: Sitting, BP Method: Large (Automatic))   Pulse 62   Temp 97 °F (36.1 °C) (Oral)   Ht 5' 10" (1.778 m)   Wt 79.3 kg (174 lb 15 oz)   BMI 25.10 kg/m²   General: Pleasant, cooperative, NAD   Gait: antalgic  HEENT: NCAT, sclera nonicteric   Lungs: Respirations clear bilaterally; equal and unlabored.   CV: S1S2; 2+ bilateral upper and lower extremity pulses.   Skin: Intact throughout with no rashes, erythema, or lesions  Extremities: No LE edema,  no erythema or warmth of the skin in either lower extremity.    Right knee exam:  Knee Range of " Motion: active, pain with passive range of motion  Effusion:none  Condition of skin:intact  Location of tenderness:Lateral joint line   Strength:5 of 5 quadriceps strength and 5 of 5 hamstring strength  Stability:  MCL: grade I    Hip Examination:full painless range of motion, without tenderness    Radiographs: Radiographs reveal advanced degenerative changes including subchondral cyst formation, subchondral sclerosis, osteophyte formation, joint space narrowing.     Knee Alignment:  Moderate valgus    Diagnosis: osteoarthritis Right knee    Plan: Right total knee arthroplasty    Due to the serious nature of total joint infection and the high prevalence of community acquired MRSA, vancomycin will be used perioperatively.

## 2020-05-18 NOTE — H&P
CC: Right knee pain    Aurora Castle is a 74 y.o. female with 5 year history of Right knee pain. Pain is worse with activity and weight bearing.  Patient has experienced interference of activities of daily living due to decreased range of motion and an increase in joint pain and swelling.  Patient has failed non-operative treatment including NSAIDs, corticosteroid injections, viscosupplement injections, and activity modification.  Aurora Castle currently ambulates independently.     Relevant medical conditions of significance in perioperative period:  None    Past Medical History:   Diagnosis Date    Arthritis     History of adenomatous polyp of colon     Joint pain     [facility]neumonia     during [facility]     Tendonitis of ankle or foot     Trouble in sleeping     Urinary incontinence        Past Surgical History:   Procedure Laterality Date     SECTION  2009    COLONOSCOPY N/A 2018    Procedure: COLONOSCOPY;  Surgeon: Jim Borges MD;  Location: [facility] ([address] [place] [address]);  Service: Endoscopy;  Laterality: N/A;    hemorrhiodectomy      tendon surgery L foot         Family History   Problem Relation Age of Onset    Heart disease Mother 78        MI    Parkinsonism Father     No Known Problems Daughter     Melanoma Neg Hx     Psoriasis Neg Hx     Lupus Neg Hx     Eczema Neg Hx     Acne Neg Hx        Review of patient's allergies indicates:   Allergen Reactions    Erythromycin Itching and Swelling     Eye swelling   Local eye ointment         Current Outpatient Medications:     ascorbic acid (VITAMIN C) 1000 MG tablet, Take 1 tablet by mouth Daily., Disp: , Rfl:     BIOTIN ORAL, Take 5,000 mcg by mouth once daily., Disp: , Rfl:     cholecalciferol, vitamin D3, (VITAMIN D3) 5,000 unit Tab, Take 1 tablet by mouth once daily., Disp: , Rfl:     doxylamine succinate (UNISOM, DOXYLAMINE, ORAL), Take by mouth every evening., Disp: , Rfl:     fish oil-omega-3 fatty acids 300-1,000 mg  "capsule, Take 2 g by mouth once daily., Disp: , Rfl:     glucosamine-chondroit-vit C-Mn (GLUCOSAMINE-CHONDROITIN COMPLX) 731-014-56-2.5 mg Cap, Take 1 capsule by mouth once daily. , Disp: , Rfl:     MELATONIN ORAL, Take by mouth every evening. , Disp: , Rfl:     MULTIVITAMIN WITH MINERALS (MULTIVITAMIN & MINERAL FORMULA) Tab, Take 1 tablet by mouth Daily.   'iris-lert', Disp: , Rfl:     naproxen sodium (ANAPROX) 220 MG tablet, Take 440 mg by mouth 2 (two) times daily with meals., Disp: , Rfl:     ciclopirox (LOPROX) 0.77 % Susp, Apply topically 2 (two) times daily. (Patient not taking: Reported on 5/18/2020), Disp: 30 mL, Rfl: 2    VIT C/VIT E/LUTEIN/MIN/OMEGA-3 (OCUVITE ORAL), Take 1 tablet by mouth once daily., Disp: , Rfl:     Review of Systems:  Constitutional: no fever or chills  Eyes: no visual changes  ENT: no nasal congestion or sore throat  Respiratory: no cough or shortness of breath  Cardiovascular: no chest pain or palpitations  Gastrointestinal: no nausea or vomiting, tolerating diet  Genitourinary: no hematuria or dysuria  Integument/Breast: no rash or pruritis  Hematologic/Lymphatic: no easy bruising or lymphadenopathy  Musculoskeletal: positive for knee pain  Neurological: no seizures or tremors  Behavioral/Psych: no auditory or visual hallucinations  Endocrine: no heat or cold intolerance    PE:  /74 (BP Location: Left arm, Patient Position: Sitting, BP Method: Large (Automatic))   Pulse 62   Temp 97 °F (36.1 °C) (Oral)   Ht 5' 10" (1.778 m)   Wt 79.3 kg (174 lb 15 oz)   BMI 25.10 kg/m²   General: Pleasant, cooperative, NAD   Gait: antalgic  HEENT: NCAT, sclera nonicteric   Lungs: Respirations clear bilaterally; equal and unlabored.   CV: S1S2; 2+ bilateral upper and lower extremity pulses.   Skin: Intact throughout with no rashes, erythema, or lesions  Extremities: No LE edema,  no erythema or warmth of the skin in either lower extremity.    Right knee exam:  Knee Range of " Motion: active, pain with passive range of motion  Effusion:none  Condition of skin:intact  Location of tenderness:Lateral joint line   Strength:5 of 5 quadriceps strength and 5 of 5 hamstring strength  Stability:  MCL: grade I    Hip Examination:full painless range of motion, without tenderness    Radiographs: Radiographs reveal advanced degenerative changes including subchondral cyst formation, subchondral sclerosis, osteophyte formation, joint space narrowing.     Knee Alignment:  Moderate valgus    Diagnosis: osteoarthritis Right knee    Plan: Right total knee arthroplasty    Due to the serious nature of total joint infection and the high prevalence of community acquired MRSA, vancomycin will be used perioperatively.

## 2020-05-18 NOTE — PROGRESS NOTES
Aurora Castle is a 74 y.o. year old here today for a pre-operative visit in preparation for a Right total knee arthroplasty to be performed by  Dr. Patton on 6/8/2020.  she was last seen and treated in the clinic on 3/5/2020. she will be medically optimized by the pre op center. There has been no significant change in medical status since last visit. No fever, chills, malaise, or unexplained weight change.      Allergies, Medications, past medical and surgical history reviewed.    Focused examination performed.    Patient declined to see Dr. Patton today in clinic.   All questions answered. Patient encouraged to call with questions. Contact information given.     Pre, johann, and post operative procedures and expectations discussed. Questions were answered. Aurora Castle has been educated and is ready to proceed with surgery. Approximately 30 minutes was spent discussing surgical outcomes, plans, procedures pre, johann, and post operative expections and care.  Surgical consent signed.    Aurora Castle will contact us if there are any questions, concerns, or changes in medical status prior to surgery.       COVID-19 test date: 6/6

## 2020-05-20 DIAGNOSIS — Z01.818 PRE-OP TESTING: Primary | ICD-10-CM

## 2020-05-20 DIAGNOSIS — M79.604 PAIN OF RIGHT LOWER EXTREMITY: ICD-10-CM

## 2020-05-20 NOTE — ANESTHESIA PAT ROS NOTE
05/20/2020  Aurora Castle is a 74 y.o., female.      Pre-op Assessment          Review of Systems  Anesthesia Hx:  History of prior surgery of interest to airway management or planning: Previous anesthesia: General Denies Family Hx of Anesthesia complications.   Denies Personal Hx of Anesthesia complications.   Social:  Non-Smoker, Social Alcohol Use    Hematology/Oncology:  Hematology Normal   Oncology Normal     EENT/Dental:EENT/Dental Normal   Cardiovascular:  Cardiovascular Normal   Denies Angina.  Functional Capacity good / => 4 METS    Pulmonary:   Denies Shortness of breath.  Denies Recent URI.    Renal/:  Renal/ Normal     Hepatic/GI:  Hepatic/GI Normal    Musculoskeletal:  Joint Disease:  Arthritis, Osteoarthritis RIGHT KNEE   Neurological:  Pain , onset is chronic , quality of aching/dull , alleviating factors are NAPROXEN. Arthritis, Osteoarthritis    Endocrine:  Endocrine Normal    Psych:  Psychiatric Normal           Physical Exam  General:  Well nourished    Airway/Jaw/Neck:  Airway Findings: Mouth Opening: Normal Tongue: Normal  Jaw/Neck Findings:  Neck ROM: Normal ROM      Dental:  Dental Findings: In tact   Chest/Lungs:  Chest/Lungs Findings: Clear to auscultation     Heart/Vascular:  Heart Findings: Rate: Normal  Rhythm: Regular Rhythm  Sounds: Normal        Mental Status:  Mental Status Findings:  Cooperative, Alert and Oriented         Anesthesia Assessment: Preoperative EQUATION    Planned Procedure: Procedure(s) (LRB):  ARTHROPLASTY, KNEE, TOTAL-DEPUY-SIGMA/DEPUY-TC3/MBT back up (Right)  Requested Anesthesia Type:Regional  Surgeon: Zack Patton III, MD  Service: Orthopedics  Known or anticipated Date of Surgery:6/8/2020    Surgeon notes: reviewed    Previous anesthesia records:colonoscopy 6/1/18    Last PCP note: within [facility] , Dr Epstein 6/18/19    Other important  co-morbidities: none      Tests already available:  No recent tests.               Plan:    Testing:  BMP, EKG, Hematology Profile and PT/INR   Pre-anesthesia  visit       Visit focus: possible regional anesthesia and/or nerve block      Consultation: Perioperative Hospitalist-had virtual visit with Dr Stafford 4/23/20; optimization pending exam       Navigation: Tests to be scheduled.              Consults to be scheduled.             Results will be tracked by Preop Clinic.      6/3/2020-LABS/EKG REVIEWED. PATIENT OPTIMIZED BY .

## 2020-05-20 NOTE — PRE-PROCEDURE INSTRUCTIONS
Chart review, triage plan initiated. Phone contact-left message informing pt that the POC  will contact her to schedule appointments prior to surgery; instructed to stop vitamins, supplements and NSAIDs for one week prior to surgery.

## 2020-05-22 ENCOUNTER — PATIENT MESSAGE (OUTPATIENT)
Dept: SURGERY | Facility: HOSPITAL | Age: 75
End: 2020-05-22

## 2020-06-02 ENCOUNTER — INITIAL CONSULT (OUTPATIENT)
Dept: INTERNAL MEDICINE | Facility: CLINIC | Age: 75
End: 2020-06-02
Payer: MEDICARE

## 2020-06-02 ENCOUNTER — HOSPITAL ENCOUNTER (OUTPATIENT)
Dept: PREADMISSION TESTING | Facility: HOSPITAL | Age: 75
Discharge: HOME | End: 2020-06-02
Attending: ANESTHESIOLOGY
Payer: MEDICARE

## 2020-06-02 ENCOUNTER — HOSPITAL ENCOUNTER (OUTPATIENT)
Dept: CARDIOLOGY | Facility: CLINIC | Age: 75
Discharge: HOME | End: 2020-06-02
Payer: MEDICARE

## 2020-06-02 VITALS
HEIGHT: 70 IN | WEIGHT: 175.81 LBS | OXYGEN SATURATION: 98 % | DIASTOLIC BLOOD PRESSURE: 71 MMHG | BODY MASS INDEX: 25.17 KG/M2 | TEMPERATURE: 98 F | SYSTOLIC BLOOD PRESSURE: 135 MMHG | HEART RATE: 78 BPM

## 2020-06-02 DIAGNOSIS — Z01.818 PRE-OP TESTING: ICD-10-CM

## 2020-06-02 DIAGNOSIS — R60.9 EDEMA, UNSPECIFIED TYPE: ICD-10-CM

## 2020-06-02 DIAGNOSIS — M17.11 PRIMARY OSTEOARTHRITIS OF RIGHT KNEE: ICD-10-CM

## 2020-06-02 DIAGNOSIS — D75.89 MACROCYTOSIS: ICD-10-CM

## 2020-06-02 PROCEDURE — 93005 ELECTROCARDIOGRAM TRACING: CPT | Mod: HCNC,S$GLB,, | Performed by: ANESTHESIOLOGY

## 2020-06-02 PROCEDURE — 99999 PR PBB SHADOW E&M-EST. PATIENT-LVL II: CPT | Mod: PBBFAC,HCNC,, | Performed by: HOSPITALIST

## 2020-06-02 PROCEDURE — 93010 ELECTROCARDIOGRAM REPORT: CPT | Mod: HCNC,S$GLB,, | Performed by: INTERNAL MEDICINE

## 2020-06-02 PROCEDURE — 99999 PR PBB SHADOW E&M-EST. PATIENT-LVL II: ICD-10-PCS | Mod: PBBFAC,HCNC,, | Performed by: HOSPITALIST

## 2020-06-02 PROCEDURE — 99499 NO LOS: ICD-10-PCS | Mod: HCNC,S$GLB,, | Performed by: HOSPITALIST

## 2020-06-02 PROCEDURE — 99499 UNLISTED E&M SERVICE: CPT | Mod: HCNC,S$GLB,, | Performed by: HOSPITALIST

## 2020-06-02 PROCEDURE — 93005 EKG 12-LEAD: ICD-10-PCS | Mod: HCNC,S$GLB,, | Performed by: ANESTHESIOLOGY

## 2020-06-02 PROCEDURE — 93010 EKG 12-LEAD: ICD-10-PCS | Mod: HCNC,S$GLB,, | Performed by: INTERNAL MEDICINE

## 2020-06-02 NOTE — ASSESSMENT & PLAN NOTE
Edema- I suggested avoidance of added salt,avoidance of NSAID's, unless advised or ordered  and suggested Limb elevation and lei hose use

## 2020-06-02 NOTE — PROGRESS NOTES
"[facility] - Pre Op Consult  Progress Note    Patient Name: Aurora Castle  MRN: [id number]  Date of Evaluation- 06/02/2020  PCP- Isabelle Vincent MD    Future cases for Aurora Castle [[id number]]     Case ID Status Date Time Jamar Procedure Provider Location    [id number] [facility] 6/8/2020  7:00  ARTHROPLASTY, KNEE, TOTAL-DEPUY-SIGMA/DEPUY-TC3/MBT back up Zack Patton III, MD [[id number]] [facility]        Rt   HPI:  History of present illness- I had the pleasure of meeting this pleasant 74 y.o. lady in the pre op clinic prior to her elective Orthopedic surgery. The patient is new to me .  Goes by " [employer] "    I have obtained the history by speaking to the patient and by reviewing the electronic health records.    Events leading up to surgery / History of presenting illness -    She has been troubled with moderate-severe  Rt knee   pain for 4 years .   Tried conservative treatments  Pain increases with activity , night and decreases with resting.  2  Aleves twice a day   helps- holding starting 6/2/2020 .  No previous Rt knee surgeries      Relevant health conditions of significance for the perioperative period/ History of presenting illness -    Subjectively describes health as' very good       Works as a  - sells on line , at [employer] , whole sale     Lives with  and the dog  2 story house - Bed room upstairs   Both of them works from home   Works down stairs   Help available post op -      Stays active   Rides her bicycle 45 minutes most days   Does exercises   100 sit ups every day   Takes stairs     Eats healthy   Grows her own vegetables    Not known to have heart disease , Diabetes Mellitus, HTN, Lung disease         Subjective/ Objective:       Chief complaint-Preoperative evaluation, Perioperative Medical management, complication reduction plan     Active cardiac conditions- none    Revised cardiac risk index predictors- none    Functional capacity -Examples of physical activity " -rides her bicycle   grows vegetables ,  house work and  can take 1 flight of stairs---- She can undertake all the above activities without  chest pain,chest tightness, Shortness of breath ,dizziness,lightheadedness making her exercise tolerance more,   than 4 Mets.     Review of Systems   Constitutional: Negative for chills and fever.        No unusual weight changes     HENT:        JOSE GUADALUPE score  1/ 8            Age over 50      Eyes:        No new visual changes  Reading glasses   Respiratory:        No cough , phlegm    No Hemoptysis   Cardiovascular:        As noted   Gastrointestinal:        No overt GI/ blood losses  Bowel movements- Regular   LMP- early 50's   Endocrine:        Prednisone use > 20 mg daily for 3 weeks- none    Genitourinary: Negative for dysuria.        No urinary hesitancy    Musculoskeletal:        As above      Skin: Negative for rash.   Neurological: Negative for syncope.        No unilateral weakness   Hematological:        Current use of Anticoagulants  none   Psychiatric/Behavioral:        No Depression,Anxiety       No vascular stenting             No anesthesia, bleeding, cardiac problems, PONV  with previous surgeries/procedures.  Medications and Allergies reviewed in epic.   FH- No anesthesia,bleeding / venous thrombosis ,problems in family     Physical Exam      Physical Exam  Constitutional- General appearance-Conscious,Coherent  Eyes- No conjunctival icterus,pupils  round  and reactive to light   ENT-Oral cavity- moist  , Hearing grossly normal   Neck- No thyromegaly ,Trachea -central, No jugular venous distension,   No Carotid Bruit   Cardiovascular -Heart Sounds- Normal  and  no murmur   , No gallop rhythm   Respiratory - Normal Respiratory Effort, Normal breath sounds,  no wheeze  and  no forced expiratory wheeze    Peripheral pitting pedal edema-- mild, no calf pain   Gastrointestinal -Soft abdomen, No palpable masses, Non Tender,Liver,Spleen not palpable. No-- free  fluid and shifting dullness  Musculoskeletal- No finger Clubbing. Strength grossly normal   Lymphatic-No Palpable cervical, axillary,Inguinal lymphadenopathy   Psychiatric - normal effect,Orientation  Rt Dorsalis pedis pulses-palpable    Lt Dorsalis pedis pulses- palpable   Rt Posterior tibial pulses -palpable   Left posterior tibial pulses -palpable   Miscellaneous -  Surgical scarlower abdomen   and  no renal bruit    Investigations  Lab and Imaging have been reviewed in epic.    Review of Medicine tests    EKG- I had independently reviewed the EKG from--6/2/2020   It was reported to be showing     Sinus bradycardia  Normal ECG  When compared with ECG of 16-AUG-2001 10:31,  No significant change was found    Review of clinical lab tests:  Lab Results   Component Value Date    CREATININE 0.7 06/02/2020    HGB 12.7 06/02/2020     06/02/2020           Review of old records- Was done and information gathered regards to events leading to surgery and health conditions of significance in the perioperative period.        Preoperative cardiac risk assessment-  The patient does not have any active cardiac conditions . Revised cardiac risk index predictors- 0---.Functional capacity is more than 4 Mets. She will be undergoing a Orthopedic procedure that carries a Moderate Risk risk     Risk of a major Cardiac event ( Defined as death, myocardial infarction, or cardiac arrest at 30 days after noncardiac surgery), based on RCRI score     3.9%          No further cardiac work up is indicated prior to proceeding with the surgery          American Society of Anesthesiologists Physical status classification ( ASA ) class: 2     Postoperative pulmonary complication risk assessment:      ARISCAT ( Canet) risk index- risk class -  Low, if duration of surgery is under 3 hours, intermediate, if duration of surgery is over 3 hours          Assessment/Plan:     Primary osteoarthritis of right knee  For surgery      Macrocytosis  Long standing   B12 was not low in the past     Edema  Edema- I suggested avoidance of added salt,avoidance of NSAID's, unless advised or ordered  and suggested Limb elevation and lei hose use        Preventive perioperative care    Thromboembolic prophylaxis:  Her risk factors for thrombosis include surgical procedure and age.I suggest  thromboembolic prophylaxis ( mechanical/pharmacological, weighing the risk benefits of pharmacological agent use considering johann procedural bleeding )  during the perioperative period.I suggested being active in the post operative period.   The patient is a candidate for extended DVT prophylaxis     Postoperative pulmonary complication prophylaxis-Risk factors for post operative pulmonary complications include age over 65 years- I suggest incentive spirometry use, early ambulation and end tidal carbon dioxide monitoring  , oral care , head end of bed elevation      Renal complication prophylaxis-I suggest keeping her well hydrated  in the perioperative period      Surgical site Infection Prophylaxis-I  suggest appropriate antibiotic for Prophylaxis against Surgical site infections  No reported Staph infection      In view of regional anesthesia  the patient  is at risk of postoperative urinary retention.  I suggest avoidance / minimizing the of  Benzodiazepines,Anticholinergic medication,antihistamines ( Benadryl) , if possible in the perioperative period. I suggest using the minimum possible use of opioids for the minimum period of time in the perioperative period. Benadryl avoidance suggested      This visit was focused on Preoperative evaluation, Perioperative Medical management, complication reduction plans. I suggest that the patient follows up with primary care or relevant sub specialists for ongoing health care.    I appreciate the opportunity to be involved in this patients care. Please feel free to contact me if there were any questions about this  "consultation.    Patient is optimized     Patient  was instructed to call and update me about any changes to health,  medication, office visits ,testing out side of the johann operative care center , hospitalizations between now and surgery     Mary Beth Stafford MD  Perioperative Medicine  [facility]   Pager [phone]    COVID screening     No fever -  No cough-   No SOB-  No sore throat -  No loss of taste or smell -  No muscle aches -  No nausea, vomiting , diarrhea-  -  6/4- 19 01    Called to follow up , to address any concerns with the up coming surgery or any questions on Medication instructions   Unable to speak   Left a message to call, if needed   Vitals - 1 value per visit 6/2/2020   SYSTOLIC 135   DIASTOLIC 71   PULSE 78   TEMPERATURE 97.8   RESPIRATIONS    SPO2 98   Weight (lb) 175.8   Weight (kg) 79.742   HEIGHT 5' 10"   BODY MASS INDEX 25.22       "

## 2020-06-02 NOTE — DISCHARGE INSTRUCTIONS
Your surgery has been scheduled for:__________________________________________    You should report to:  ____[facility], located on the [place] side of the first floor of the           [facility] ([phone])  ____[facility], located on the [facility] side of the            Second floor of the [facility] ([phone])  ____3rd Floor [facility] located on the [facility] side of the [facility] [phone]  Please Note   - Tell your doctor if you take Aspirin, products containing Aspirin, herbal medications  or blood thinners, such as Coumadin, Ticlid, or Plavix.  (Consult your provider regarding holding or stopping before surgery).  - Arrange for someone to drive you home following surgery.  You will not be allowed to leave the surgical facility alone or drive yourself home following sedation and anesthesia.  Before Surgery  - Stop taking all vitamins/ herbal medications 14days prior to surgery  - No Motrin/Advil (Ibuprofen) 1 day before surgery  - No Aleve (Naproxen) 7 days before surgery  - Stop Taking Asprin, products containing Asprin _____days before surgery  - Stop taking blood thinners_______days before surgery  - No Goody's/BC  Powder 7 days before surgery  - Refrain from drinking alcoholic beverages for 24hours before and after surgery  - Stop or limit smoking _________days before surgery  - You may take Tylenol for pain  Night before Surgery   Stop ALL solid food, gum, candy (including vitamins) 8 hours before arrival time.  (Please note: If your surgeon gives you different eating and drinking instructions, please follow surgeon's directions.)   Stop all CLOUDY liquids: coffee with creamer, formula, tube feeds, cloudy juices, non-human milk and breast milk with additives, 6 hours prior to arrival time.   Stop plain breast milk 4 hours prior to arrival time.   The patient should be ENCOURAGED to drink  carbohydrate-rich clear liquids (sports drinks, clear juices) until 2 hours prior to arrival time.   CLEAR liquids include only water, black coffee NO creamer, clear oral rehydration drinks, clear sports drinks or clear fruit juices (no orange juice, no pulpy juices, no apple cider). Advise patients if they can read newsprint through the liquid, it qualifies as clear liquid.    IF IN DOUBT, drink water instead.   - Take a shower or bath (shower is recommended).  Bathe with Hibiclens soap or an antibacterial soap from the neck down.  If not supplied by your surgeon, hibiclens soap will need to be purchased over the counter in pharmacy.  Rinse soap off thoroughly.  - Shampoo your hair with your regular shampoo  The Day of Surgery  · NOTHING TO  DRINK 2 hours before arrival time. If you are told to take medication on the morning of surgery, it may be taken with a sip of water.   - Take another bath or shower with hibiclens or any antibacterial soap, to reduce the chance of infection.  - Take heart and blood pressure medications with a small sip of water, as advised by the perioperative team.  - Do not take fluid pills  - You may brush your teeth and rinse your mouth, but do not swall any additional water.   - Do not apply perfumes, powder, body lotions or deodorant on the day of surgery.  - Nail polish should be removed.  - Do not wear makeup or moisturizer  - Wear comfortable clothes, such as a button front shirt and loose fitting pants.  - Leave all jewelry, including body piercings, and valuables at home.    - Bring any devices you will neeed after surgery such as crutches or canes.  - If you have sleep apnea, please bring your CPAP machine  In the event that your physical condition changes including the onset of a cold or respiratory illness, or if you have to delay or cancel your surgery, please notify your surgeon.    Anesthesia: Regional Anesthesia    Youre scheduled for surgery. During surgery, youll  receive medicine called anesthesia to keep you comfortable and pain-free. Your surgeon has decided that youll receive regional anesthesia. This sheet tells you what to expect with this type of anesthesia.  What is regional anesthesia?  Regional anesthesia numbs one region of your body. The anesthesia may be given around nerves or into veins in your arms, neck, or legs (nerve block or [place] block). Or it may be sent into the spinal fluid (spinal anesthesia) or into the space just outside the spinal fluid (epidural anesthesia). You may also be given sedatives to help you relax.  Nerve block or [place] block  A small area of the body, such as an arm or leg, can be numbed using a nerve block or [place] block.  · Nerve block. During a nerve block, your skin is numbed. A needle is then inserted near nerves that serve the area to be numbed. Anesthetic is sent through the needle.  · IV regional or [place] block. For this type of block, an IV line is put into a vein. The blood flow to the area to be numbed is blocked for a short time. Anesthetic is sent through the IV.  Spinal anesthesia  Spinal anesthesia numbs your body from about the waist down.  · Anesthetic is injected into the spinal fluid. This is a substance that surrounds the spinal cord in your spinal column. The anesthetic blocks pain traveling from the body to the brain.  · To receive the anesthetic, your skin is numbed at the injection site on your back.  · A needle is then inserted into the spinal space. Anesthetic is sent into the spinal fluid through the needle.  Epidural anesthesia  Epidural anesthesia is most commonly used during childbirth and may also be used after surgical procedures of the chest, belly, and legs.  · Anesthetic is injected into the epidural space. This is just outside the dural sac which contains the spinal fluid.  · To receive the anesthetic, your skin is numbed at the injection site on your back.  · A needle is then inserted into the epidural  space. Anesthetic is sent into the epidural space through the needle.  · A small flexible catheter may be attached to the needle and left in place. This allows for continuous injections or infusions of anesthetic.  Anesthesia tools and medicines that might be near you during your procedure  · Local anesthetic. This medicine is given through a needle numbs one region of your body.  · Electrocardiography leads (electrodes). These are used to record your heart rate and rhythm.  · Blood pressure cuff. A cuff is placed on your arm to keep track of your blood pressure.  · Pulse oximeter. This small clip is placed on the end of the finger. It measures your blood oxygen level.  · Sedatives. These medicines may be given through an IV. They help to relax you and keep you comfortable. You may stay awake or sleep lightly.  · Oxygen. You may be given oxygen through a facemask.  Risks and possible complications  Regional anesthesia carries some risks. These include:  · Nausea and vomiting  · Headache  · Backache  · Decreased blood pressure  · Allergic reaction to the anesthetic  · Ongoing numbness (rare)  · Irregular heartbeat (rare)  · Cardiac arrest (rare)   Date Last Reviewed: 12/1/2016  © 6925-1069 [employer] [address] [place] [address], [place] [address]. All rights reserved. This information is not intended as a substitute for professional medical care. Always follow your healthcare professional's instructions.

## 2020-06-02 NOTE — OUTPATIENT SUBJECTIVE & OBJECTIVE
Outpatient Subjective & Objective     Chief complaint-Preoperative evaluation, Perioperative Medical management, complication reduction plan     Active cardiac conditions- none    Revised cardiac risk index predictors- none    Functional capacity -Examples of physical activity -rides her bicycle   grows vegetables ,  house work and  can take 1 flight of stairs---- She can undertake all the above activities without  chest pain,chest tightness, Shortness of breath ,dizziness,lightheadedness making her exercise tolerance more,   than 4 Mets.     Review of Systems   Constitutional: Negative for chills and fever.        No unusual weight changes     HENT:        LANCEG score  1/ 8            Age over 50      Eyes:        No new visual changes  Reading glasses   Respiratory:        No cough , phlegm    No Hemoptysis   Cardiovascular:        As noted   Gastrointestinal:        No overt GI/ blood losses  Bowel movements- Regular   LMP- early 50's   Endocrine:        Prednisone use > 20 mg daily for 3 weeks- none    Genitourinary: Negative for dysuria.        No urinary hesitancy    Musculoskeletal:        As above      Skin: Negative for rash.   Neurological: Negative for syncope.        No unilateral weakness   Hematological:        Current use of Anticoagulants  none   Psychiatric/Behavioral:        No Depression,Anxiety       No vascular stenting             No anesthesia, bleeding, cardiac problems, PONV  with previous surgeries/procedures.  Medications and Allergies reviewed in epic.   FH- No anesthesia,bleeding / venous thrombosis ,problems in family     Physical Exam      Physical Exam  Constitutional- General appearance-Conscious,Coherent  Eyes- No conjunctival icterus,pupils  round  and reactive to light   ENT-Oral cavity- moist  , Hearing grossly normal   Neck- No thyromegaly ,Trachea -central, No jugular venous distension,   No Carotid Bruit   Cardiovascular -Heart Sounds- Normal  and  no murmur   , No gallop  rhythm   Respiratory - Normal Respiratory Effort, Normal breath sounds,  no wheeze  and  no forced expiratory wheeze    Peripheral pitting pedal edema-- mild, no calf pain   Gastrointestinal -Soft abdomen, No palpable masses, Non Tender,Liver,Spleen not palpable. No-- free fluid and shifting dullness  Musculoskeletal- No finger Clubbing. Strength grossly normal   Lymphatic-No Palpable cervical, axillary,Inguinal lymphadenopathy   Psychiatric - normal effect,Orientation  Rt Dorsalis pedis pulses-palpable    Lt Dorsalis pedis pulses- palpable   Rt Posterior tibial pulses -palpable   Left posterior tibial pulses -palpable   Miscellaneous -  Surgical scarlower abdomen   and  no renal bruit    Investigations  Lab and Imaging have been reviewed in [facility].    Review of Medicine tests    EKG- I had independently reviewed the EKG from--6/2/2020   It was reported to be showing     Sinus bradycardia  Normal ECG  When compared with ECG of 16-AUG-2001 10:31,  No significant change was found    Review of clinical lab tests:  Lab Results   Component Value Date    CREATININE 0.7 06/02/2020    HGB 12.7 06/02/2020     06/02/2020           Review of old records- Was done and information gathered regards to events leading to surgery and health conditions of significance in the perioperative period.    Outpatient Subjective & Objective

## 2020-06-02 NOTE — LETTER
June 2, 2020      Zack Patton III, MD  [address] [facility] [address]           [facility] - Pre Op Consult  [address] [facility] [address]  Phone: [phone]          Patient: Aurora Castle   MR Number: [id number]   YOB: 1945   Date of Visit: 6/2/2020       Dear Dr. Zack Patton III:    Thank you for referring Aurora Castle to me for evaluation. Attached you will find relevant portions of my assessment and plan of care.    If you have questions, please do not hesitate to call me. I look forward to following Aurroa Castle along with you.    Sincerely,    Mary Beth Stafford MD    Enclosure  CC:  No Recipients    If you would like to receive this communication electronically, please contact [email or web address] or [phone] to request more information on [employer] access.    For providers and/or their staff who would like to refer a patient to [facility], please contact us through our one-stop-shop provider referral line, [facility], at [phone].    If you feel you have received this communication in error or would no longer like to receive these types of communications, please e-mail [email or web address]

## 2020-06-02 NOTE — HPI
"History of present illness- I had the pleasure of meeting this pleasant 74 y.o. lady in the pre op clinic prior to her elective Orthopedic surgery. The patient is new to me .  Goes by " Susan "    I have obtained the history by speaking to the patient and by reviewing the electronic health records.    Events leading up to surgery / History of presenting illness -    She has been troubled with moderate-severe  Rt knee   pain for 4 years .   Tried conservative treatments  Pain increases with activity , night and decreases with resting.  2  Aleves twice a day  helps- holding starting 6/2/2020 .  No previous Rt knee surgeries      Relevant health conditions of significance for the perioperative period/ History of presenting illness -    Subjectively describes health as' very good       Works as a  - sells on line , at [employer] , whole sale     Lives with  and the dog  2 story house - Bed room upstairs   Both of them works from home   Works down stairs   Help available post op -      Stays active   Rides her bicycle 45 minutes most days   Does exercises   100 sit ups every day   Takes stairs     Eats healthy   Grows her own vegetables    Not known to have heart disease , Diabetes Mellitus, HTN, Lung disease     "

## 2020-06-03 DIAGNOSIS — Z96.651 STATUS POST TOTAL RIGHT KNEE REPLACEMENT: Primary | ICD-10-CM

## 2020-06-03 DIAGNOSIS — M17.11 PRIMARY OSTEOARTHRITIS OF RIGHT KNEE: ICD-10-CM

## 2020-06-03 RX ORDER — ONDANSETRON 8 MG/1
8 TABLET, ORALLY DISINTEGRATING ORAL EVERY 12 HOURS PRN
Qty: 20 TABLET | Refills: 0 | Status: SHIPPED | OUTPATIENT
Start: 2020-06-03 | End: 2020-10-14

## 2020-06-03 RX ORDER — OXYCODONE HYDROCHLORIDE 5 MG/1
TABLET ORAL
Qty: 50 TABLET | Refills: 0 | Status: SHIPPED | OUTPATIENT
Start: 2020-06-03 | End: 2020-06-15 | Stop reason: SDUPTHER

## 2020-06-03 RX ORDER — DOCUSATE SODIUM 100 MG/1
100 CAPSULE, LIQUID FILLED ORAL 2 TIMES DAILY PRN
Qty: 60 CAPSULE | Refills: 0 | Status: SHIPPED | OUTPATIENT
Start: 2020-06-03 | End: 2020-10-14

## 2020-06-03 RX ORDER — DEXTROMETHORPHAN HYDROBROMIDE, GUAIFENESIN 5; 100 MG/5ML; MG/5ML
650 LIQUID ORAL EVERY 8 HOURS PRN
Qty: 120 TABLET | Refills: 0 | Status: SHIPPED | OUTPATIENT
Start: 2020-06-03 | End: 2020-10-14

## 2020-06-03 RX ORDER — CELECOXIB 200 MG/1
200 CAPSULE ORAL DAILY
Qty: 30 CAPSULE | Refills: 0 | Status: SHIPPED | OUTPATIENT
Start: 2020-06-03 | End: 2020-07-06

## 2020-06-03 RX ORDER — ASPIRIN 81 MG/1
81 TABLET ORAL 2 TIMES DAILY
Qty: 60 TABLET | Refills: 0 | Status: SHIPPED | OUTPATIENT
Start: 2020-06-03 | End: 2020-10-14

## 2020-06-05 ENCOUNTER — TELEPHONE (OUTPATIENT)
Dept: ORTHOPEDICS | Facility: CLINIC | Age: 75
End: 2020-06-05

## 2020-06-05 ENCOUNTER — ANESTHESIA EVENT (OUTPATIENT)
Dept: SURGERY | Facility: HOSPITAL | Age: 75
End: 2020-06-05
Payer: MEDICARE

## 2020-06-05 NOTE — TELEPHONE ENCOUNTER
I called the patient today regarding surgery on 6/8/2020 with Dr. Zack Patton. I informed the patient that her surgery will be at  [facility]. I informed the patient they must arrive at 9:00am and their surgery will start at 11:00am. The patient understands the information that I provided them. The patient has no further questions.

## 2020-06-06 ENCOUNTER — LAB VISIT (OUTPATIENT)
Dept: INTERNAL MEDICINE | Facility: CLINIC | Age: 75
End: 2020-06-06
Payer: MEDICARE

## 2020-06-06 DIAGNOSIS — Z01.818 PRE-OP TESTING: ICD-10-CM

## 2020-06-06 LAB — SARS-COV-2 RNA RESP QL NAA+PROBE: NOT DETECTED

## 2020-06-06 PROCEDURE — U0003 INFECTIOUS AGENT DETECTION BY NUCLEIC ACID (DNA OR RNA); SEVERE ACUTE RESPIRATORY SYNDROME CORONAVIRUS 2 (SARS-COV-2) (CORONAVIRUS DISEASE [COVID-19]), AMPLIFIED PROBE TECHNIQUE, MAKING USE OF HIGH THROUGHPUT TECHNOLOGIES AS DESCRIBED BY CMS-2020-01-R: HCPCS | Mod: HCNC

## 2020-06-08 ENCOUNTER — HOSPITAL ENCOUNTER (OUTPATIENT)
Facility: HOSPITAL | Age: 75
Discharge: HOME | End: 2020-06-09
Attending: ORTHOPAEDIC SURGERY | Admitting: ORTHOPAEDIC SURGERY
Payer: MEDICARE

## 2020-06-08 ENCOUNTER — ANESTHESIA (OUTPATIENT)
Dept: SURGERY | Facility: HOSPITAL | Age: 75
End: 2020-06-08
Payer: MEDICARE

## 2020-06-08 DIAGNOSIS — M17.11 PRIMARY OSTEOARTHRITIS OF RIGHT KNEE: ICD-10-CM

## 2020-06-08 PROCEDURE — 25000003 PHARM REV CODE 250: Performed by: ANESTHESIOLOGY

## 2020-06-08 PROCEDURE — 64448 NJX AA&/STRD FEM NRV NFS IMG: CPT | Performed by: STUDENT IN AN ORGANIZED HEALTH CARE EDUCATION/TRAINING PROGRAM

## 2020-06-08 PROCEDURE — 63600175 PHARM REV CODE 636 W HCPCS: Performed by: ANESTHESIOLOGY

## 2020-06-08 PROCEDURE — 27201423 OPTIME MED/SURG SUP & DEVICES STERILE SUPPLY: Performed by: ORTHOPAEDIC SURGERY

## 2020-06-08 PROCEDURE — 63600175 PHARM REV CODE 636 W HCPCS: Performed by: STUDENT IN AN ORGANIZED HEALTH CARE EDUCATION/TRAINING PROGRAM

## 2020-06-08 PROCEDURE — D9220A PRA ANESTHESIA: ICD-10-PCS | Mod: ANES,,, | Performed by: ANESTHESIOLOGY

## 2020-06-08 PROCEDURE — 36000711: Performed by: ORTHOPAEDIC SURGERY

## 2020-06-08 PROCEDURE — 27447 PR TOTAL KNEE ARTHROPLASTY: ICD-10-PCS | Mod: RT,,, | Performed by: ORTHOPAEDIC SURGERY

## 2020-06-08 PROCEDURE — 37000009 HC ANESTHESIA EA ADD 15 MINS: Performed by: ORTHOPAEDIC SURGERY

## 2020-06-08 PROCEDURE — C1751 CATH, INF, PER/CENT/MIDLINE: HCPCS | Performed by: STUDENT IN AN ORGANIZED HEALTH CARE EDUCATION/TRAINING PROGRAM

## 2020-06-08 PROCEDURE — 99900035 HC TECH TIME PER 15 MIN (STAT)

## 2020-06-08 PROCEDURE — 97161 PT EVAL LOW COMPLEX 20 MIN: CPT

## 2020-06-08 PROCEDURE — 25000003 PHARM REV CODE 250: Performed by: NURSE ANESTHETIST, CERTIFIED REGISTERED

## 2020-06-08 PROCEDURE — 94799 UNLISTED PULMONARY SVC/PX: CPT

## 2020-06-08 PROCEDURE — 36000710: Performed by: ORTHOPAEDIC SURGERY

## 2020-06-08 PROCEDURE — D9220A PRA ANESTHESIA: Mod: CRNA,,, | Performed by: NURSE ANESTHETIST, CERTIFIED REGISTERED

## 2020-06-08 PROCEDURE — 97530 THERAPEUTIC ACTIVITIES: CPT

## 2020-06-08 PROCEDURE — 94761 N-INVAS EAR/PLS OXIMETRY MLT: CPT

## 2020-06-08 PROCEDURE — C1776 JOINT DEVICE (IMPLANTABLE): HCPCS | Performed by: ORTHOPAEDIC SURGERY

## 2020-06-08 PROCEDURE — 63600175 PHARM REV CODE 636 W HCPCS: Performed by: PHYSICIAN ASSISTANT

## 2020-06-08 PROCEDURE — 64448 NJX AA&/STRD FEM NRV NFS IMG: CPT | Mod: 59,RT,, | Performed by: ANESTHESIOLOGY

## 2020-06-08 PROCEDURE — 76942 PR U/S GUIDANCE FOR NEEDLE GUIDANCE: ICD-10-PCS | Mod: 26,,, | Performed by: ANESTHESIOLOGY

## 2020-06-08 PROCEDURE — 64448 PR NERVE BLOCK INJ, ANES/STEROID, FEMORAL, CONT INFUSION, INCL IMAG GUIDANCE: ICD-10-PCS | Mod: 59,RT,, | Performed by: ANESTHESIOLOGY

## 2020-06-08 PROCEDURE — 25000003 PHARM REV CODE 250: Performed by: ORTHOPAEDIC SURGERY

## 2020-06-08 PROCEDURE — 27447 TOTAL KNEE ARTHROPLASTY: CPT | Mod: RT,,, | Performed by: ORTHOPAEDIC SURGERY

## 2020-06-08 PROCEDURE — 76942 ECHO GUIDE FOR BIOPSY: CPT | Performed by: STUDENT IN AN ORGANIZED HEALTH CARE EDUCATION/TRAINING PROGRAM

## 2020-06-08 PROCEDURE — C1713 ANCHOR/SCREW BN/BN,TIS/BN: HCPCS | Performed by: ORTHOPAEDIC SURGERY

## 2020-06-08 PROCEDURE — 25000003 PHARM REV CODE 250: Performed by: PHYSICIAN ASSISTANT

## 2020-06-08 PROCEDURE — 63600175 PHARM REV CODE 636 W HCPCS: Performed by: NURSE ANESTHETIST, CERTIFIED REGISTERED

## 2020-06-08 PROCEDURE — 27200710 HC EPIDURAL INFUSION PUMP SET: Performed by: STUDENT IN AN ORGANIZED HEALTH CARE EDUCATION/TRAINING PROGRAM

## 2020-06-08 PROCEDURE — 71000039 HC RECOVERY, EACH ADD'L HOUR: Performed by: ORTHOPAEDIC SURGERY

## 2020-06-08 PROCEDURE — 27100025 HC TUBING, SET FLUID WARMER: Performed by: ANESTHESIOLOGY

## 2020-06-08 PROCEDURE — 63600175 PHARM REV CODE 636 W HCPCS: Performed by: ORTHOPAEDIC SURGERY

## 2020-06-08 PROCEDURE — 37000008 HC ANESTHESIA 1ST 15 MINUTES: Performed by: ORTHOPAEDIC SURGERY

## 2020-06-08 PROCEDURE — D9220A PRA ANESTHESIA: ICD-10-PCS | Mod: CRNA,,, | Performed by: NURSE ANESTHETIST, CERTIFIED REGISTERED

## 2020-06-08 PROCEDURE — S0028 INJECTION, FAMOTIDINE, 20 MG: HCPCS | Performed by: NURSE ANESTHETIST, CERTIFIED REGISTERED

## 2020-06-08 PROCEDURE — 71000033 HC RECOVERY, INTIAL HOUR: Performed by: ORTHOPAEDIC SURGERY

## 2020-06-08 PROCEDURE — D9220A PRA ANESTHESIA: Mod: ANES,,, | Performed by: ANESTHESIOLOGY

## 2020-06-08 PROCEDURE — 76942 ECHO GUIDE FOR BIOPSY: CPT | Mod: 26,,, | Performed by: ANESTHESIOLOGY

## 2020-06-08 PROCEDURE — 97116 GAIT TRAINING THERAPY: CPT | Mod: 59

## 2020-06-08 PROCEDURE — 97165 OT EVAL LOW COMPLEX 30 MIN: CPT

## 2020-06-08 DEVICE — CEMENT BONE WHOLE BATCH: Type: IMPLANTABLE DEVICE | Site: KNEE | Status: FUNCTIONAL

## 2020-06-08 DEVICE — COMPONENT SIGMA FEM PS SZ4 R: Type: IMPLANTABLE DEVICE | Site: KNEE | Status: FUNCTIONAL

## 2020-06-08 DEVICE — INSERT TIB STBL 1 X 8MM: Type: IMPLANTABLE DEVICE | Site: KNEE | Status: FUNCTIONAL

## 2020-06-08 DEVICE — PATELLA OVAL 38MM: Type: IMPLANTABLE DEVICE | Site: KNEE | Status: FUNCTIONAL

## 2020-06-08 DEVICE — TRAY TIBIAL CEMENT SZ 3 COBAL: Type: IMPLANTABLE DEVICE | Site: KNEE | Status: FUNCTIONAL

## 2020-06-08 RX ORDER — SODIUM CHLORIDE 9 MG/ML
INJECTION, SOLUTION INTRAVENOUS CONTINUOUS
Status: DISCONTINUED | OUTPATIENT
Start: 2020-06-08 | End: 2020-06-09 | Stop reason: HOSPADM

## 2020-06-08 RX ORDER — CELECOXIB 200 MG/1
200 CAPSULE ORAL DAILY
Status: DISCONTINUED | OUTPATIENT
Start: 2020-06-09 | End: 2020-06-09 | Stop reason: HOSPADM

## 2020-06-08 RX ORDER — ONDANSETRON 2 MG/ML
4 INJECTION INTRAMUSCULAR; INTRAVENOUS EVERY 8 HOURS PRN
Status: DISCONTINUED | OUTPATIENT
Start: 2020-06-08 | End: 2020-06-09 | Stop reason: HOSPADM

## 2020-06-08 RX ORDER — MIDAZOLAM HYDROCHLORIDE 1 MG/ML
INJECTION, SOLUTION INTRAMUSCULAR; INTRAVENOUS
Status: DISCONTINUED | OUTPATIENT
Start: 2020-06-08 | End: 2020-06-08

## 2020-06-08 RX ORDER — POLYETHYLENE GLYCOL 3350 17 G/17G
17 POWDER, FOR SOLUTION ORAL DAILY
Status: DISCONTINUED | OUTPATIENT
Start: 2020-06-09 | End: 2020-06-09 | Stop reason: HOSPADM

## 2020-06-08 RX ORDER — OXYCODONE HYDROCHLORIDE 5 MG/1
5 TABLET ORAL
Status: DISCONTINUED | OUTPATIENT
Start: 2020-06-08 | End: 2020-06-08 | Stop reason: HOSPADM

## 2020-06-08 RX ORDER — LIDOCAINE HYDROCHLORIDE 20 MG/ML
INJECTION INTRAVENOUS
Status: DISCONTINUED | OUTPATIENT
Start: 2020-06-08 | End: 2020-06-08

## 2020-06-08 RX ORDER — MEPERIDINE HYDROCHLORIDE 50 MG/ML
6.25 INJECTION INTRAMUSCULAR; INTRAVENOUS; SUBCUTANEOUS ONCE
Status: COMPLETED | OUTPATIENT
Start: 2020-06-08 | End: 2020-06-08

## 2020-06-08 RX ORDER — GLYCOPYRROLATE 0.2 MG/ML
INJECTION INTRAMUSCULAR; INTRAVENOUS
Status: DISCONTINUED | OUTPATIENT
Start: 2020-06-08 | End: 2020-06-08

## 2020-06-08 RX ORDER — FAMOTIDINE 10 MG/ML
INJECTION INTRAVENOUS
Status: DISCONTINUED | OUTPATIENT
Start: 2020-06-08 | End: 2020-06-08

## 2020-06-08 RX ORDER — FENTANYL CITRATE 50 UG/ML
25 INJECTION, SOLUTION INTRAMUSCULAR; INTRAVENOUS EVERY 5 MIN PRN
Status: COMPLETED | OUTPATIENT
Start: 2020-06-08 | End: 2020-06-08

## 2020-06-08 RX ORDER — SODIUM CHLORIDE 9 MG/ML
INJECTION, SOLUTION INTRAVENOUS
Status: COMPLETED | OUTPATIENT
Start: 2020-06-08 | End: 2020-06-08

## 2020-06-08 RX ORDER — MIDAZOLAM HYDROCHLORIDE 1 MG/ML
1 INJECTION INTRAMUSCULAR; INTRAVENOUS EVERY 5 MIN PRN
Status: DISCONTINUED | OUTPATIENT
Start: 2020-06-08 | End: 2020-06-08 | Stop reason: HOSPADM

## 2020-06-08 RX ORDER — ROPIVACAINE HYDROCHLORIDE 2 MG/ML
5 INJECTION, SOLUTION EPIDURAL; INFILTRATION; PERINEURAL
Status: COMPLETED | OUTPATIENT
Start: 2020-06-08 | End: 2020-06-08

## 2020-06-08 RX ORDER — TRANEXAMIC ACID 100 MG/ML
INJECTION, SOLUTION INTRAVENOUS
Status: DISCONTINUED | OUTPATIENT
Start: 2020-06-08 | End: 2020-06-08

## 2020-06-08 RX ORDER — FAMOTIDINE 20 MG/1
20 TABLET, FILM COATED ORAL 2 TIMES DAILY
Status: DISCONTINUED | OUTPATIENT
Start: 2020-06-08 | End: 2020-06-09 | Stop reason: HOSPADM

## 2020-06-08 RX ORDER — CEFAZOLIN SODIUM 1 G/3ML
2 INJECTION, POWDER, FOR SOLUTION INTRAMUSCULAR; INTRAVENOUS
Status: COMPLETED | OUTPATIENT
Start: 2020-06-08 | End: 2020-06-08

## 2020-06-08 RX ORDER — VANCOMYCIN HCL IN 5 % DEXTROSE 1G/250ML
1000 PLASTIC BAG, INJECTION (ML) INTRAVENOUS
Status: COMPLETED | OUTPATIENT
Start: 2020-06-08 | End: 2020-06-08

## 2020-06-08 RX ORDER — ONDANSETRON 2 MG/ML
4 INJECTION INTRAMUSCULAR; INTRAVENOUS DAILY PRN
Status: DISCONTINUED | OUTPATIENT
Start: 2020-06-08 | End: 2020-06-08 | Stop reason: HOSPADM

## 2020-06-08 RX ORDER — SODIUM CHLORIDE 0.9 % (FLUSH) 0.9 %
10 SYRINGE (ML) INJECTION
Status: DISCONTINUED | OUTPATIENT
Start: 2020-06-08 | End: 2020-06-08 | Stop reason: HOSPADM

## 2020-06-08 RX ORDER — ONDANSETRON 2 MG/ML
INJECTION INTRAMUSCULAR; INTRAVENOUS
Status: DISCONTINUED | OUTPATIENT
Start: 2020-06-08 | End: 2020-06-08

## 2020-06-08 RX ORDER — BISACODYL 10 MG
10 SUPPOSITORY, RECTAL RECTAL EVERY 12 HOURS PRN
Status: DISCONTINUED | OUTPATIENT
Start: 2020-06-08 | End: 2020-06-09 | Stop reason: HOSPADM

## 2020-06-08 RX ORDER — CELECOXIB 200 MG/1
400 CAPSULE ORAL
Status: COMPLETED | OUTPATIENT
Start: 2020-06-08 | End: 2020-06-08

## 2020-06-08 RX ORDER — NALOXONE HCL 0.4 MG/ML
0.02 VIAL (ML) INJECTION
Status: DISCONTINUED | OUTPATIENT
Start: 2020-06-08 | End: 2020-06-09 | Stop reason: HOSPADM

## 2020-06-08 RX ORDER — TRANEXAMIC ACID 100 MG/ML
1000 INJECTION, SOLUTION INTRAVENOUS
Status: DISCONTINUED | OUTPATIENT
Start: 2020-06-08 | End: 2020-06-08 | Stop reason: HOSPADM

## 2020-06-08 RX ORDER — ROPIVACAINE HYDROCHLORIDE 5 MG/ML
INJECTION, SOLUTION EPIDURAL; INFILTRATION; PERINEURAL
Status: DISCONTINUED | OUTPATIENT
Start: 2020-06-08 | End: 2020-06-08

## 2020-06-08 RX ORDER — ROPIVACAINE/EPI/CLONIDINE/KET 2.46-0.005
SYRINGE (ML) INJECTION
Status: DISCONTINUED | OUTPATIENT
Start: 2020-06-08 | End: 2020-06-08 | Stop reason: HOSPADM

## 2020-06-08 RX ORDER — MORPHINE SULFATE 2 MG/ML
2 INJECTION, SOLUTION INTRAMUSCULAR; INTRAVENOUS
Status: DISCONTINUED | OUTPATIENT
Start: 2020-06-08 | End: 2020-06-09 | Stop reason: HOSPADM

## 2020-06-08 RX ORDER — MUPIROCIN 20 MG/G
1 OINTMENT TOPICAL
Status: COMPLETED | OUTPATIENT
Start: 2020-06-08 | End: 2020-06-08

## 2020-06-08 RX ORDER — PREGABALIN 75 MG/1
75 CAPSULE ORAL
Status: COMPLETED | OUTPATIENT
Start: 2020-06-08 | End: 2020-06-08

## 2020-06-08 RX ORDER — LIDOCAINE HYDROCHLORIDE AND EPINEPHRINE 15; 5 MG/ML; UG/ML
INJECTION, SOLUTION EPIDURAL
Status: DISCONTINUED | OUTPATIENT
Start: 2020-06-08 | End: 2020-06-08

## 2020-06-08 RX ORDER — TALC
6 POWDER (GRAM) TOPICAL NIGHTLY PRN
Status: DISCONTINUED | OUTPATIENT
Start: 2020-06-08 | End: 2020-06-08 | Stop reason: HOSPADM

## 2020-06-08 RX ORDER — AMOXICILLIN 250 MG
1 CAPSULE ORAL 2 TIMES DAILY
Status: DISCONTINUED | OUTPATIENT
Start: 2020-06-08 | End: 2020-06-09 | Stop reason: HOSPADM

## 2020-06-08 RX ORDER — SODIUM CHLORIDE 9 MG/ML
INJECTION, SOLUTION INTRAVENOUS CONTINUOUS PRN
Status: DISCONTINUED | OUTPATIENT
Start: 2020-06-08 | End: 2020-06-08

## 2020-06-08 RX ORDER — VANCOMYCIN HYDROCHLORIDE 1 G/20ML
INJECTION, POWDER, LYOPHILIZED, FOR SOLUTION INTRAVENOUS
Status: DISCONTINUED | OUTPATIENT
Start: 2020-06-08 | End: 2020-06-08 | Stop reason: HOSPADM

## 2020-06-08 RX ORDER — OXYCODONE HYDROCHLORIDE 5 MG/1
5 TABLET ORAL
Status: DISCONTINUED | OUTPATIENT
Start: 2020-06-08 | End: 2020-06-09 | Stop reason: HOSPADM

## 2020-06-08 RX ORDER — FENTANYL CITRATE 50 UG/ML
25 INJECTION, SOLUTION INTRAMUSCULAR; INTRAVENOUS EVERY 5 MIN PRN
Status: DISCONTINUED | OUTPATIENT
Start: 2020-06-08 | End: 2020-06-08 | Stop reason: HOSPADM

## 2020-06-08 RX ORDER — PROPOFOL 10 MG/ML
VIAL (ML) INTRAVENOUS CONTINUOUS PRN
Status: DISCONTINUED | OUTPATIENT
Start: 2020-06-08 | End: 2020-06-08

## 2020-06-08 RX ORDER — ESMOLOL HYDROCHLORIDE 10 MG/ML
INJECTION INTRAVENOUS
Status: DISCONTINUED | OUTPATIENT
Start: 2020-06-08 | End: 2020-06-08

## 2020-06-08 RX ORDER — ROPIVACAINE HYDROCHLORIDE 2 MG/ML
8 INJECTION, SOLUTION EPIDURAL; INFILTRATION; PERINEURAL CONTINUOUS
Status: DISCONTINUED | OUTPATIENT
Start: 2020-06-08 | End: 2020-06-09 | Stop reason: HOSPADM

## 2020-06-08 RX ORDER — MUPIROCIN 20 MG/G
1 OINTMENT TOPICAL 2 TIMES DAILY
Status: DISCONTINUED | OUTPATIENT
Start: 2020-06-08 | End: 2020-06-09 | Stop reason: HOSPADM

## 2020-06-08 RX ORDER — ACETAMINOPHEN 500 MG
1000 TABLET ORAL EVERY 6 HOURS
Status: DISCONTINUED | OUTPATIENT
Start: 2020-06-08 | End: 2020-06-09 | Stop reason: HOSPADM

## 2020-06-08 RX ORDER — CEFAZOLIN SODIUM 1 G/3ML
2 INJECTION, POWDER, FOR SOLUTION INTRAMUSCULAR; INTRAVENOUS
Status: COMPLETED | OUTPATIENT
Start: 2020-06-08 | End: 2020-06-09

## 2020-06-08 RX ORDER — DEXAMETHASONE SODIUM PHOSPHATE 4 MG/ML
INJECTION, SOLUTION INTRA-ARTICULAR; INTRALESIONAL; INTRAMUSCULAR; INTRAVENOUS; SOFT TISSUE
Status: DISCONTINUED | OUTPATIENT
Start: 2020-06-08 | End: 2020-06-08

## 2020-06-08 RX ORDER — PREGABALIN 75 MG/1
75 CAPSULE ORAL NIGHTLY
Status: DISCONTINUED | OUTPATIENT
Start: 2020-06-08 | End: 2020-06-09 | Stop reason: HOSPADM

## 2020-06-08 RX ORDER — FENTANYL CITRATE 50 UG/ML
INJECTION, SOLUTION INTRAMUSCULAR; INTRAVENOUS
Status: DISCONTINUED | OUTPATIENT
Start: 2020-06-08 | End: 2020-06-08

## 2020-06-08 RX ORDER — LIDOCAINE HYDROCHLORIDE 10 MG/ML
1 INJECTION, SOLUTION EPIDURAL; INFILTRATION; INTRACAUDAL; PERINEURAL
Status: DISCONTINUED | OUTPATIENT
Start: 2020-06-08 | End: 2020-06-08 | Stop reason: HOSPADM

## 2020-06-08 RX ORDER — METHOCARBAMOL 500 MG/1
1000 TABLET, FILM COATED ORAL ONCE
Status: COMPLETED | OUTPATIENT
Start: 2020-06-08 | End: 2020-06-08

## 2020-06-08 RX ORDER — PROPOFOL 10 MG/ML
VIAL (ML) INTRAVENOUS
Status: DISCONTINUED | OUTPATIENT
Start: 2020-06-08 | End: 2020-06-08

## 2020-06-08 RX ORDER — OXYCODONE HYDROCHLORIDE 10 MG/1
10 TABLET ORAL
Status: DISCONTINUED | OUTPATIENT
Start: 2020-06-08 | End: 2020-06-09 | Stop reason: HOSPADM

## 2020-06-08 RX ORDER — KETAMINE HCL IN 0.9 % NACL 50 MG/5 ML
SYRINGE (ML) INTRAVENOUS
Status: DISCONTINUED | OUTPATIENT
Start: 2020-06-08 | End: 2020-06-08

## 2020-06-08 RX ORDER — ASPIRIN 81 MG/1
81 TABLET ORAL 2 TIMES DAILY
Status: DISCONTINUED | OUTPATIENT
Start: 2020-06-08 | End: 2020-06-09 | Stop reason: HOSPADM

## 2020-06-08 RX ADMIN — SODIUM CHLORIDE: 0.9 INJECTION, SOLUTION INTRAVENOUS at 10:06

## 2020-06-08 RX ADMIN — LIDOCAINE HYDROCHLORIDE,EPINEPHRINE BITARTRATE 3 ML: 15; .005 INJECTION, SOLUTION EPIDURAL; INFILTRATION; INTRACAUDAL; PERINEURAL at 01:06

## 2020-06-08 RX ADMIN — ROPIVACAINE HYDROCHLORIDE 8 ML/HR: 2 INJECTION, SOLUTION EPIDURAL; INFILTRATION at 02:06

## 2020-06-08 RX ADMIN — PROPOFOL 20 MG: 10 INJECTION, EMULSION INTRAVENOUS at 11:06

## 2020-06-08 RX ADMIN — FAMOTIDINE 20 MG: 20 TABLET, FILM COATED ORAL at 08:06

## 2020-06-08 RX ADMIN — Medication 10 MG: at 12:06

## 2020-06-08 RX ADMIN — TRANEXAMIC ACID 1000 MG: 100 INJECTION, SOLUTION INTRAVENOUS at 01:06

## 2020-06-08 RX ADMIN — MEPERIDINE HYDROCHLORIDE 6.5 MG: 50 INJECTION INTRAMUSCULAR; INTRAVENOUS; SUBCUTANEOUS at 03:06

## 2020-06-08 RX ADMIN — MUPIROCIN 1 G: 20 OINTMENT TOPICAL at 10:06

## 2020-06-08 RX ADMIN — ONDANSETRON 4 MG: 2 INJECTION INTRAMUSCULAR; INTRAVENOUS at 04:06

## 2020-06-08 RX ADMIN — FENTANYL CITRATE 25 MCG: 50 INJECTION INTRAMUSCULAR; INTRAVENOUS at 10:06

## 2020-06-08 RX ADMIN — DOCUSATE SODIUM - SENNOSIDES 1 TABLET: 50; 8.6 TABLET, FILM COATED ORAL at 08:06

## 2020-06-08 RX ADMIN — PREGABALIN 75 MG: 75 CAPSULE ORAL at 10:06

## 2020-06-08 RX ADMIN — MUPIROCIN 1 G: 20 OINTMENT TOPICAL at 08:06

## 2020-06-08 RX ADMIN — PROPOFOL 100 MCG/KG/MIN: 10 INJECTION, EMULSION INTRAVENOUS at 11:06

## 2020-06-08 RX ADMIN — DEXAMETHASONE SODIUM PHOSPHATE 8 MG: 4 INJECTION, SOLUTION INTRAMUSCULAR; INTRAVENOUS at 11:06

## 2020-06-08 RX ADMIN — OXYCODONE 5 MG: 5 TABLET ORAL at 02:06

## 2020-06-08 RX ADMIN — METHOCARBAMOL TABLETS 1000 MG: 500 TABLET, COATED ORAL at 03:06

## 2020-06-08 RX ADMIN — OXYCODONE 5 MG: 5 TABLET ORAL at 06:06

## 2020-06-08 RX ADMIN — MIDAZOLAM 1 MG: 1 INJECTION INTRAMUSCULAR; INTRAVENOUS at 11:06

## 2020-06-08 RX ADMIN — FENTANYL CITRATE 25 MCG: 50 INJECTION INTRAMUSCULAR; INTRAVENOUS at 03:06

## 2020-06-08 RX ADMIN — VANCOMYCIN HYDROCHLORIDE 1000 MG: 1 INJECTION, POWDER, LYOPHILIZED, FOR SOLUTION INTRAVENOUS at 10:06

## 2020-06-08 RX ADMIN — ONDANSETRON 4 MG: 2 INJECTION INTRAMUSCULAR; INTRAVENOUS at 01:06

## 2020-06-08 RX ADMIN — GLYCOPYRROLATE 0.2 MG: 0.2 INJECTION, SOLUTION INTRAMUSCULAR; INTRAVENOUS at 11:06

## 2020-06-08 RX ADMIN — FENTANYL CITRATE 25 MCG: 50 INJECTION, SOLUTION INTRAMUSCULAR; INTRAVENOUS at 11:06

## 2020-06-08 RX ADMIN — ASPIRIN 81 MG: 81 TABLET, COATED ORAL at 08:06

## 2020-06-08 RX ADMIN — ACETAMINOPHEN 1000 MG: 500 TABLET ORAL at 06:06

## 2020-06-08 RX ADMIN — CELECOXIB 400 MG: 200 CAPSULE ORAL at 10:06

## 2020-06-08 RX ADMIN — TRANEXAMIC ACID 1000 MG: 100 INJECTION, SOLUTION INTRAVENOUS at 11:06

## 2020-06-08 RX ADMIN — ROPIVACAINE HYDROCHLORIDE 8 ML: 5 INJECTION, SOLUTION EPIDURAL; INFILTRATION; PERINEURAL at 10:06

## 2020-06-08 RX ADMIN — Medication 20 MG: at 11:06

## 2020-06-08 RX ADMIN — MIDAZOLAM HYDROCHLORIDE 2 MG: 1 INJECTION, SOLUTION INTRAMUSCULAR; INTRAVENOUS at 10:06

## 2020-06-08 RX ADMIN — ROPIVACAINE HYDROCHLORIDE 5 ML: 2 INJECTION, SOLUTION EPIDURAL; INFILTRATION at 04:06

## 2020-06-08 RX ADMIN — SODIUM CHLORIDE, SODIUM GLUCONATE, SODIUM ACETATE, POTASSIUM CHLORIDE, MAGNESIUM CHLORIDE, SODIUM PHOSPHATE, DIBASIC, AND POTASSIUM PHOSPHATE: .53; .5; .37; .037; .03; .012; .00082 INJECTION, SOLUTION INTRAVENOUS at 01:06

## 2020-06-08 RX ADMIN — LIDOCAINE HYDROCHLORIDE 20 MG: 20 INJECTION, SOLUTION INTRAVENOUS at 11:06

## 2020-06-08 RX ADMIN — CEFAZOLIN 2 G: 330 INJECTION, POWDER, FOR SOLUTION INTRAMUSCULAR; INTRAVENOUS at 08:06

## 2020-06-08 RX ADMIN — SODIUM CHLORIDE: 0.9 INJECTION, SOLUTION INTRAVENOUS at 02:06

## 2020-06-08 RX ADMIN — FAMOTIDINE 20 MG: 10 INJECTION, SOLUTION INTRAVENOUS at 11:06

## 2020-06-08 RX ADMIN — FENTANYL CITRATE 25 MCG: 50 INJECTION INTRAMUSCULAR; INTRAVENOUS at 04:06

## 2020-06-08 RX ADMIN — CEFAZOLIN 2 G: 330 INJECTION, POWDER, FOR SOLUTION INTRAMUSCULAR; INTRAVENOUS at 11:06

## 2020-06-08 RX ADMIN — FENTANYL CITRATE 25 MCG: 50 INJECTION INTRAMUSCULAR; INTRAVENOUS at 02:06

## 2020-06-08 RX ADMIN — ESMOLOL HYDROCHLORIDE 20 MG: 100 INJECTION, SOLUTION INTRAVENOUS at 12:06

## 2020-06-08 RX ADMIN — OXYCODONE HYDROCHLORIDE 10 MG: 10 TABLET ORAL at 09:06

## 2020-06-08 RX ADMIN — FENTANYL CITRATE 50 MCG: 50 INJECTION, SOLUTION INTRAMUSCULAR; INTRAVENOUS at 11:06

## 2020-06-08 RX ADMIN — OXYCODONE 5 MG: 5 TABLET ORAL at 03:06

## 2020-06-08 RX ADMIN — SODIUM CHLORIDE, SODIUM GLUCONATE, SODIUM ACETATE, POTASSIUM CHLORIDE, MAGNESIUM CHLORIDE, SODIUM PHOSPHATE, DIBASIC, AND POTASSIUM PHOSPHATE: .53; .5; .37; .037; .03; .012; .00082 INJECTION, SOLUTION INTRAVENOUS at 12:06

## 2020-06-08 RX ADMIN — PREGABALIN 75 MG: 75 CAPSULE ORAL at 08:06

## 2020-06-08 NOTE — ANESTHESIA PROCEDURE NOTES
CSE    Patient location during procedure: OR  Start time: 6/8/2020 11:40 AM  Timeout: 6/8/2020 11:40 AM  End time: 6/8/2020 11:50 AM    Staffing  Authorizing Provider: Zayda Bui MD  Performing Provider: Randy Muñoz MD    Preanesthetic Checklist  Completed: patient identified, site marked, surgical consent, pre-op evaluation, timeout performed, IV checked, risks and benefits discussed and monitors and equipment checked  CSE  Patient position: sitting  Prep: ChloraPrep  Patient monitoring: heart rate, continuous pulse ox and frequent blood pressure checks  Approach: midline  Spinal Needle  Needle type: [place]   Needle gauge: 25 G  Needle length: 5 in  Epidural Needle  Injection technique: THEODORA saline  Needle type: Tuohy   Needle gauge: 17 G  Needle length: 3.5 in  Needle insertion depth: 8 cm  Location: L4-5  Needle localization: anatomical landmarks  Catheter  Catheter type: [employer]  Catheter size: 19 G  Catheter at skin depth: 13 cm  Test dose: lidocaine 1.5% with Epi 1-to-200,000  Additional Documentation: incremental injection, negative aspiration for CSF, negative aspiration for heme, no paresthesia on injection and negative test dose  Assessment  Intrathecal Medications:  administered: primary anesthetic mcg of

## 2020-06-08 NOTE — ANESTHESIA PROCEDURE NOTES
Right adductor canal catheter    Patient location during procedure: pre-op   Block not for primary anesthetic.  Reason for block: at surgeon's request and post-op pain management   Post-op Pain Location: right leg pain  Start time: 6/8/2020 10:45 AM  Timeout: 6/8/2020 10:45 AM   End time: 6/8/2020 10:55 AM    Staffing  Authorizing Provider: Zayda Bui MD  Performing Provider: Randy Muñoz MD    Preanesthetic Checklist  Completed: patient identified, site marked, surgical consent, pre-op evaluation, timeout performed, IV checked, risks and benefits discussed and monitors and equipment checked  Peripheral Block  Patient position: supine  Prep: ChloraPrep and site prepped and draped  Patient monitoring: heart rate, cardiac monitor, continuous pulse ox, continuous capnometry and frequent blood pressure checks  Block type: adductor canal  Laterality: right  Injection technique: continuous  Needle  Needle type: Tuohy   Needle gauge: 17 G  Needle length: 3.5 in  Needle localization: anatomical landmarks and ultrasound guidance  Catheter type: spring wound  Catheter size: 19 G  Test dose: lidocaine 1.5% with Epi 1-to-200,000 and negative   -ultrasound image captured on disc.  Assessment  Injection assessment: negative aspiration, negative parasthesia and local visualized surrounding nerve  Paresthesia pain: none  Heart rate change: no  Slow fractionated injection: yes  Additional Notes  VSS.  [facility] RN monitoring vitals throughout procedure.  Patient tolerated procedure well.

## 2020-06-08 NOTE — PLAN OF CARE
Patient tolerated PT session fairly today. She ambulated 32ft with RW and CGA. She was limited in ambulation distance due to pain. She is okay to ambulate with nursing staff assistance and RW.    Problem: Physical Therapy Goal  Goal: Physical Therapy Goal  Description  Goals to be met by: 2020    Patient will increase functional independence with mobility by performin. Supine to sit with Modified [place]  2. Sit to stand transfer with Modified [place]  3. Gait x300 feet with Modified [place] using Rolling Walker  4. Ascend/descend 8 stairs with right handrails and Stand-by Assistance   5. Lower extremity exercise program x30 reps per handout, with supervision     Outcome: Ongoing, Progressing

## 2020-06-08 NOTE — PROGRESS NOTES
Dr cross notified that patient is shivering and denies being cold.  TORB meperidine 6.25 ivp x 1 now.

## 2020-06-08 NOTE — PT/OT/SLP EVAL
Physical Therapy Evaluation and Treatment     Patient Name:  Aurora Castle   MRN:  [id number]    Recommendations:     Discharge Recommendations:  outpatient PT([facility] 6/10/2020)   Discharge Equipment Recommendations: bedside commode, walker, rolling   Barriers to discharge: Inaccessible home    Assessment:     Aurora Castle is a 74 y.o. female admitted with a medical diagnosis of Primary osteoarthritis of right knee.  She presents with the following impairments/functional limitations:  weakness, impaired functional mobilty, gait instability, impaired balance, decreased lower extremity function, decreased ROM, impaired joint extensibility, pain, impaired skin, orthopedic precautions. Patient tolerated PT session fairly today. She ambulated 32ft with RW and CGA. She was limited in ambulation distance due to pain. She is okay to ambulate with nursing staff assistance and RW.    Rehab Prognosis: Good; patient would benefit from acute skilled PT services to address these deficits and reach maximum level of function.    Recent Surgery: Procedure(s) (LRB):  ARTHROPLASTY, KNEE, TOTAL-DEPUY-SIGMA/DEPUY-TC3/MBT back up (Right) Day of Surgery    Plan:     During this hospitalization, patient to be seen daily to address the identified rehab impairments via gait training, therapeutic activities, therapeutic exercises and progress toward the following goals:    · Plan of Care Expires:  06/12/20    Subjective     Chief Complaint: Pain in right knee.   Patient/Family Comments/goals: To walk without pain.   Pain/Comfort:  · Pain Rating 1: 6/10  · Location - Side 1: Right  · Location - Orientation 1: generalized  · Location 1: knee  · Pain Addressed 1: Pre-medicate for activity, Distraction, Cessation of Activity, Nurse notified    Living Environment:  Patient lives with her  in a 2SH with 4 steps and B HR's (too wide to reach at the same time) to enter. 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 There are 16 steps with a right handrail and wall on the left to get to 2nd floor. Prior to admission, patients level of function was independent for functional mobility. Equipment used at home: shower chair. DME owned (not currently used): single point cane. Upon discharge, patient will have assistance from .    Objective:     Communicated with RN prior to session.  Patient found up in chair with cryotherapy, peripheral IV, FCD, perineural catheter upon PT entry to room.    General Precautions: Standard, fall   Orthopedic Precautions:RLE weight bearing as tolerated   Braces: N/A     Exams:  · Cognitive Exam:  Patient is oriented to Person, Place, Time and Situation  · Sensation:    · -       Intact  · RLE ROM: WFL at hip and ankle but limited at knee due to pain from surgery   · RLE Strength: appears WFL but unable to formally assess due to pain   · LLE ROM: WFL  · LLE Strength: WFL    Functional Mobility:  · Transfers:     · Sit to Stand:  contact guard assistance with rolling walker x1 from bedside chair with verbal cues for hand placement   · Gait:  Patient ambulated 32ft with Rolling Walker and CGA using 3-point gait. Patient demonstrated decreased chapo and decreased step length during gait due to pain, decreased ROM and decreased strength. Verbal cues provided for gait sequence and RW management.       Therapeutic Activities and Exercises:   Patient educated in:  -PT role and POC  -safety with transfers including hand placement  -gait sequencing and RW management  -OOB activity to maximize recovery with nursing staff assistance and RW      AM-PAC 6 CLICK MOBILITY  Total Score:17     Patient left up in chair with all lines intact, call button in reach, RN notified, and  present.    GOALS:   Multidisciplinary Problems     Physical Therapy Goals        Problem: Physical Therapy Goal    Goal Priority Disciplines Outcome Goal Variances Interventions   Physical Therapy Goal     PT, PT/OT  Ongoing, Progressing     Description:  Goals to be met by: 2020    Patient will increase functional independence with mobility by performin. Supine to sit with Modified [place]  2. Sit to stand transfer with Modified [place]  3. Gait x300 feet with Modified [place] using Rolling Walker  4. Ascend/descend 8 stairs with right handrails and Stand-by Assistance   5. Lower extremity exercise program x30 reps per handout, with supervision                      History:     Past Medical History:   Diagnosis Date    Arthritis     History of adenomatous polyp of colon     Joint pain     Pneumonia     during college     Tendonitis of ankle or foot     Trouble in sleeping     Urinary incontinence        Past Surgical History:   Procedure Laterality Date     SECTION          COLONOSCOPY N/A 2018    Procedure: COLONOSCOPY;  Surgeon: Jim Borges MD;  Location: [facility] ([address] [place] [address]);  Service: Endoscopy;  Laterality: N/A;    hemorrhiodectomy      tendon surgery L foot         Time Tracking:     PT Received On: 20  PT Start Time: 1709     PT Stop Time: 1725  PT Total Time (min): 16 min     Billable Minutes: Evaluation  8 and Gait Training 8      Lizz Klein, PT  2020

## 2020-06-08 NOTE — PLAN OF CARE
Patient on floor, resting in bed comfortably. PT at bedside, pt rates pain of 6.  Iv intact and infusing free of irration,  at bedside, fcds on, pt using I.s, fall precautions maintained no falls noted. Call light in reach bed locked and in lowest position. Non skid socks on while out of bed. Patient instructed to call for assistance. Skin integrity maintained as patient is independent with frequent positioning, C/o pain managed with PRN meds, No other complaints or concerns. Will continue to monitor and follow careplan of care.

## 2020-06-08 NOTE — PT/OT/SLP EVAL
"Occupational Therapy   Evaluation    Name: Aurora Castle  MRN: [id number]  Admitting Diagnosis:  Primary osteoarthritis of right knee Day of Surgery    Recommendations:     Discharge Recommendations: home  Discharge Equipment Recommendations:  bedside commode  Barriers to discharge:       Assessment:     Aurora Castle is a 74 y.o. female with a medical diagnosis of Primary osteoarthritis of right knee.  Patient s/p right TKA. Patient reported 8/10 pain right knee. She completed bed mobility and sit-stand at MIN A. She required verbal cuing to place weight into/through right foot/leg. She would benefit from skilled OT s/p surgery to increase independence with ADLs and ADL transfers.  Performance deficits affecting function: weakness, gait instability, decreased lower extremity function, impaired balance, impaired self care skills, impaired functional mobilty, impaired endurance, orthopedic precautions.      Rehab Prognosis: Good; patient would benefit from acute skilled OT services to address these deficits and reach maximum level of function.       Plan:     Patient to be seen daily to address the above listed problems via self-care/home management, therapeutic activities, therapeutic exercises  · Plan of Care Expires: 06/15/20  · Plan of Care Reviewed with: patient, spouse    Subjective     Chief Complaint: "pain in right knee"  Patient/Family Comments/goals: "increase independence"    Occupational Profile:  Living Environment: lives with  on 2 level 14-16 steps, tub/shower combo with chair, raised toilet seat without handles   Previous level of function: independent with ADLs  Roles and Routines: Makes jewelry downstairs   Equipment Used at Home:  shower chair  Assistance upon Discharge:      Pain/Comfort:  · Pain Rating 1: 8/10  · Location - Side 1: Right  · Location 1: knee  · Pain Addressed 1: Distraction, Nurse notified, Reposition    Patients cultural, spiritual, Judaism conflicts given " the current situation: no    Objective:     Communicated with: RN prior to session.  Patient found supine with FCD, perineural catheter, peripheral IV, cryotherapy upon OT entry to room.    General Precautions: Standard, fall   Orthopedic Precautions:RLE weight bearing as tolerated   Braces: N/A     Occupational Performance:    Bed Mobility:    · Patient completed Supine to Sit with minimum assistance assistance required with right leg    Functional Mobility/Transfers:  · Patient completed Sit <> Stand Transfer with minimum assistance  with  rolling walker   · Patient completed Bed <> Chair Transfer using Step Transfer technique with minimum assistance with rolling walker  · Functional Mobility: patient ambulated 6 baby steps with rolling walker to chair verbal cues required for placing weight through right foot/leg    Activities of Daily Living:  · Upper Body Dressing: minimum assistance to don gown for back assistance required for IV    Cognitive/Visual Perceptual:  Cognitive/Psychosocial Skills:     -       Oriented to: Person, Place and Time   -       Follows Commands/attention:Follows multistep  commands    Physical Exam:  Upper Extremity Range of Motion:     -       Right Upper Extremity: WFL  -       Left Upper Extremity: WFL  Upper Extremity Strength:    -       Right Upper Extremity: WFL  -       Left Upper Extremity: WFL    AMPAC 6 Click ADL:  AMPAC Total Score: 19    Treatment & Education:  -Patient educated on hand placement for transfers   -Patient educated on rolling walker placement for ADLs  -Patient educated on role of OT and plan of care   Education:    Patient left up in chair with all lines intact, call button in reach, RN notified and physical therapy present    GOALS:   Multidisciplinary Problems     Occupational Therapy Goals        Problem: Occupational Therapy Goal    Goal Priority Disciplines Outcome Interventions   Occupational Therapy Goal     OT, PT/OT Ongoing, Progressing     Description:  Goals to be met by: 6-15-20    Patient will increase functional independence with ADLs by performing:    UE Dressing with [place].  LE Dressing with Modified [place].  Grooming while standing at sink with Modified [place].  Toileting from toilet with Modified [place] for hygiene and clothing management.   Toilet transfer to toilet with Modified [place].                      History:     Past Medical History:   Diagnosis Date    Arthritis     History of adenomatous polyp of colon     Joint pain     Pneumonia     during college     Tendonitis of ankle or foot     Trouble in sleeping     Urinary incontinence        Past Surgical History:   Procedure Laterality Date     SECTION          COLONOSCOPY N/A 2018    Procedure: COLONOSCOPY;  Surgeon: Jim Borges MD;  Location: [facility] ([address] [place] [address]);  Service: Endoscopy;  Laterality: N/A;    hemorrhiodectomy      tendon surgery L foot         Time Tracking:     OT Date of Treatment: 20  OT Start Time:   OT Stop Time: 1708  OT Total Time (min): 20 min    Billable Minutes:Evaluation 10  Therapeutic Activity 10    Raquel Casillas OT  2020

## 2020-06-08 NOTE — PLAN OF CARE
"VSS. Pt able to tolerate oral liquids. Pt reports pains is "much better" at a 6/10. Pt reports pain is tolerable.. Polar care and dressing intact. Spouse received home meds per bedside delivery. FCDs and Teds intact. IV fluids infusing and sapphire pump at bedside. Pt states she will need a walker upon D/C home. Pt to be transferred via bed with RNs at side, pt stable for transfer. No distress noted.   "

## 2020-06-08 NOTE — PROGRESS NOTES
Dr. Bui at bedside.  Patient c/o pain 9/10 to anterior and posterior right knee.    OK for 25 mcg fentanyl x 1 now per pacu order and 5 ml bolus ropivicaine 0.2% via sapphire pump.

## 2020-06-08 NOTE — PLAN OF CARE
Verbal timeout with OR nurse Larissa. Verified pt's name, , allergies, anesthesia and surgical consents. Pt calm, ready for surgery, no apparent distress. Spouse at pt's side.

## 2020-06-08 NOTE — ANESTHESIA PREPROCEDURE EVALUATION
2020  Aurora Castle is a 74 y.o., female.    Procedure Summary     Case: [id number] Date/Time: 20 1303   Procedure: ARTHROPLASTY, KNEE, TOTAL-DEPUY-SIGMA/DEPUY-TC3/MBT back up (Right Knee)   Anesthesia type: Regional   Diagnosis: Primary osteoarthritis of right knee [M17.11]       Patient Active Problem List   Diagnosis    History of adenomatous polyp of colon    Primary osteoarthritis of right knee    Insomnia    Mild vitamin D deficiency    Edema    Status post total right knee replacement 2020    Macrocytosis     Past Surgical History:   Procedure Laterality Date     SECTION          COLONOSCOPY N/A 2018    Procedure: COLONOSCOPY;  Surgeon: Jim Borges MD;  Location: [facility] ([address] [place] [address]);  Service: Endoscopy;  Laterality: N/A;    hemorrhiodectomy      tendon surgery L foot       Medication List with Changes/Refills   Current Medications    ACETAMINOPHEN (TYLENOL) 650 MG TBSR    Take 1 tablet (650 mg total) by mouth every 8 (eight) hours as needed.    ASCORBIC ACID (VITAMIN C) 1000 MG TABLET    Take 1 tablet by mouth Daily. Hold medication one week prior to surgery    ASPIRIN (ECOTRIN) 81 MG EC TABLET    Take 1 tablet (81 mg total) by mouth 2 (two) times daily.    BIOTIN ORAL    Take 5,000 mcg by mouth once daily. Hold medication one week prior to surgery    CELECOXIB (CELEBREX) 200 MG CAPSULE    Take 1 capsule (200 mg total) by mouth once daily.    CHOLECALCIFEROL, VITAMIN D3, (VITAMIN D3) 5,000 UNIT TAB    Take 1 tablet by mouth once daily.    DOCUSATE SODIUM (COLACE) 100 MG CAPSULE    Take 1 capsule (100 mg total) by mouth 2 (two) times daily as needed for Constipation.    DOXYLAMINE SUCCINATE (UNISOM, DOXYLAMINE, ORAL)    Take by mouth every evening. TAKES AT NIGHT    FISH OIL-OMEGA-3 FATTY ACIDS 300-1,000 MG CAPSULE    Take 2 g by mouth once daily. Hold  medication one week prior to surgery    GLUCOSAMINE-CHONDROIT-VIT C-MN (GLUCOSAMINE-CHONDROITIN COMPLX) 178-471-72-2.5 MG CAP    Take 1 capsule by mouth once daily. Hold medication one week prior to surgery    MELATONIN ORAL    Take by mouth every evening. TAKES AT NIGHT    MULTIVITAMIN WITH MINERALS (MULTIVITAMIN & MINERAL FORMULA) TAB    Take 1 tablet by mouth Daily. Hold medication one week prior to surgery    NAPROXEN SODIUM (ANAPROX) 220 MG TABLET    Take 440 mg by mouth 2 (two) times daily with meals. Hold medication one week prior to surgery    ONDANSETRON (ZOFRAN-ODT) 8 MG TBDL    Dissolve 1 tablet (8 mg total) by mouth every 12 (twelve) hours as needed (nausea).    OXYCODONE (ROXICODONE) 5 MG IMMEDIATE RELEASE TABLET    Take 1-2 tabs by mouth every 4-6 hours as needed for pain    VIT C/VIT E/LUTEIN/MIN/OMEGA-3 (OCUVITE ORAL)    Take 1 tablet by mouth once daily. Hold medication one week prior to surgery       Anesthesia Evaluation    I have reviewed the Patient Summary Reports.    I have reviewed the Nursing Notes. I have reviewed the NPO Status.      Review of Systems  Anesthesia Hx:  History of prior surgery of interest to airway management or planning: Previous anesthesia: General Denies Family Hx of Anesthesia complications.   Denies Personal Hx of Anesthesia complications.   Social:  Non-Smoker, Social Alcohol Use    Hematology/Oncology:     Oncology Normal     EENT/Dental:EENT/Dental Normal   Cardiovascular:  Cardiovascular Normal   Denies Angina.  Functional Capacity good / => 4 METS    Pulmonary:   Denies Shortness of breath.  Denies Recent URI.    Renal/:  Renal/ Normal     Hepatic/GI:  Hepatic/GI Normal    Musculoskeletal:  Joint Disease:  Arthritis, Osteoarthritis RIGHT KNEE   Neurological:  Pain , onset is chronic , quality of aching/dull , alleviating factors are NAPROXEN. Arthritis, Osteoarthritis    Endocrine:  Endocrine Normal    Psych:  Psychiatric Normal           Physical  Exam  General:  Well nourished    Airway/Jaw/Neck:  Airway Findings: Mouth Opening: Normal Tongue: Normal  Jaw/Neck Findings:  Neck ROM: Normal ROM      Dental:  Dental Findings: In tact   Chest/Lungs:  Chest/Lungs Findings: Clear to auscultation     Heart/Vascular:  Heart Findings: Rate: Normal  Rhythm: Regular Rhythm  Sounds: Normal        Mental Status:  Mental Status Findings:  Cooperative, Alert and Oriented         Anesthesia Assessment: Preoperative EQUATION    Planned Procedure: Procedure(s) (LRB):  ARTHROPLASTY, KNEE, TOTAL-DEPUY-SIGMA/DEPUY-TC3/MBT back up (Right)  Requested Anesthesia Type:Regional  Surgeon: Zack Patton III, MD  Service: Orthopedics  Known or anticipated Date of Surgery:6/8/2020    Surgeon notes: reviewed    Previous anesthesia records:colonoscopy 6/1/18    Last PCP note: within [facility] , Dr Epstein 6/18/19    Other important co-morbidities: none      Tests already available:  No recent tests.               Plan:    Testing:  BMP, EKG, Hematology Profile and PT/INR   Pre-anesthesia  visit       Visit focus: possible regional anesthesia and/or nerve block      Consultation: Perioperative Hospitalist-had virtual visit with Dr Stafford 4/23/20; optimization pending exam       Navigation: Tests to be scheduled.              Consults to be scheduled.             Results will be tracked by Preop Clinic.      6/3/2020-LABS/EKG REVIEWED. PATIENT OPTIMIZED BY .                 Anesthesia Plan  Type of Anesthesia, risks & benefits discussed:  Anesthesia Type:  general, CSE, regional  Patient's Preference:   Intra-op Monitoring Plan: standard ASA monitors  Intra-op Monitoring Plan Comments:   Post Op Pain Control Plan: per primary service following discharge from PACU, multimodal analgesia and peripheral nerve block  Post Op Pain Control Plan Comments:   Induction:   IV  Beta Blocker:  Patient is not currently on a Beta-Blocker (No further documentation required).       Informed  Consent: Patient understands risks and agrees with Anesthesia plan.  Questions answered. Anesthesia consent signed with patient.  ASA Score: 2     Day of Surgery Review of History & Physical:            Ready For Surgery From Anesthesia Perspective.

## 2020-06-08 NOTE — PLAN OF CARE
Problem: Occupational Therapy Goal  Goal: Occupational Therapy Goal  Description  Goals to be met by: 6-15-20    Patient will increase functional independence with ADLs by performing:    UE Dressing with [place].  LE Dressing with Modified [place].  Grooming while standing at sink with Modified [place].  Toileting from toilet with Modified [place] for hygiene and clothing management.   Toilet transfer to toilet with Modified [place].     Outcome: Ongoing, Progressing    Evaluation with goals established: patient 8/10 pain right knee, MIN A for transfers, verbal cuing to place weight into right leg  Raquel Casillas, KAREN  6/8/2020

## 2020-06-08 NOTE — OP NOTE
Abdi Right TKA  OPERATIVE NOTE    Date of Operation:   6/8/2020    Providers Performing:   Surgeon(s):  Zack Patton III, MD  Assistant: Eriberto Toscano MD    Operative Procedure:   Right Total Knee Arthroplasty, CPT 83590    Preoperative Diagnosis:   Right Knee Osteoarthritis, ICD-10 M17.11    Postoperative Diagnosis:   SAME    Components Used:   Depuy  Femur: Sigma PS Size 4N  Tibia: Sigma fixed bearing PS Size 3  Patella: Sigma Oval Dome 38 mm  Tibial Insert: Sigma fixed bearing PS inset size 8 mm  2 packs cement: Simplex    Implant Name Type Inv. Item Serial No.  Lot No. LRB No. Used Action   CEMENT BONE WHOLE BATCH - [id number]  CEMENT BONE WHOLE BATCH  Invisible. [id number] Right 2 Implanted   PATELLA OVAL 38MM - [id number]  PATELLA OVAL 38MM  [employer] [id number] Right 1 Implanted   COMPONENT SIGMA FEM PS SZ4 R - [id number]  COMPONENT SIGMA FEM PS SZ4 R  [employer] [id number] Right 1 Implanted   TRAY TIBIAL CEMENT SZ 3 COBAL - [id number]  TRAY TIBIAL CEMENT SZ 3 COBAL  [employer] [id number] Right 1 Implanted   INSERT TIB STBL 1 X 8MM - [id number]  INSERT TIB STBL 1 X 8MM  [employer] [id number] Right 1 Implanted       Anesthesia:   Spinal  Adductor canal block with catheter  MG cocktail: AVA    Estimated Blood Loss:   200 cc    Specimens:   None    Complications:   None    Indications:   The patient has failed non-operative measures including medications, injections and activity modifications for their knee.  After an explanation of risks and benefits of knee arthroplasty surgery, the patient wishes to proceed with surgery.    Operative Notes:   The patient was greeted in the pre-op holding area.  The patients knee was marked with a surgical marker by the surgeon.  Spinal-Epidural anesthesia was administered by the anesthesia team.  The patient was placed in the supine position on the OR table with all bony prominences padded.  A tourniquet was not used.  IV antibiotics were administered.  The  leg was prepped and draped in the usual sterile fashion.  A timeout was performed and the correct patient, site and procedure were identified.    A midline incision was made with a standard medical parapatellar arthrotomy.  The standard medial capsular release was performed along with the lateral gutter.  The patella was mobilized from the lateral parapatellar ligament and bony osteophytes were removed.  The intercondylar notch was cleared of the ACL/PCL.    The extramedullary tibial alignment guide was placed in the appropriate slope and varus/valgus orientation and the proximal cutting block secured by pins.  Measured resection with a 8mm cut was accounted for from the high side of the plateau, perpendicular to the ankle.  The cut was made with an oscillating saw.  We then obtained access to the femoral canal with the stepped drill.  The intramedullary miguelangel was placed in 7 degrees of valgus with a 9mm planned resection.  Our distal femoral cuts were made.  The knee was placed in extension and the medical and lateral meniscal remnants removed.  A spacer block was placed with the knee in extension checking for alignment and balance which we were happy with.    The knee was then brought into flexion and the distal femoral gap assessed for appropriate rotation.  Our distal femoral sizing guide was placed and sized appropriately.  Guide pins were placed in the appropriate external rotation.  This was assessed with the 4 in 1 cutting block and the flexion gap sizing block which was appropriate.  The distal femoral preparation was completed after the anterior, posterior and chamfer cuts were made.  The box cutting guide was placed and assessed.  The box cut was made.    At this time the trial implants were placed and knee assessed for stability, and flexion arc. The patella was prepared using free-hand technique and the three-holed lug drill guide was sized and appropriately assessed. Patella tracking was found to be  acceptable. The tibial component rotation was marked. The trials were removed. The tibial component was positioned and the keel was drilled and punched.    The wound was copiously irrigated with pulsitile lavage and the bony surfaces dried.  Cement was mixed on the back table and applied to all components.  Cementation of the femoral, tibial and patellar components was performed sequentially with removal of all excess cement. A trial insert was placed to provide compression while the cement dried and a 0.35% betadine solution was left to soak in the surgical field.     After the cement was dry, the trial poly insert was removed. Hemostasis was obtained with bovie electrocautery.  The knee was again copiously irrigated with pulsatile lavage. The final polyethylene insert was placed and the knee reduced.      1 gram of vancomycin powder was placed in the knee. The arthrotomy was closed with interrupted #1 vicryl suture and #2 quill, the subcuticular layer was closed with 2-0 vicryl suture and a running 4-0 monocryl was used to closed the skin surface.  Surgicel sealant was placed over the top of the incision and sterile dressing placed over the wound.    Sponge and needle counts were correct.    The first-assistant was critical to all steps of the operation, including retraction and leg stabilization during exposure and bone preparation, as well as the deep and superficial wound closure.  Dr. Patton performed and/or supervised the key portions of this surgical procedure, including evaluation of the bone cuts, reshaping of the bony elements, and insertion of the provisional and final components.    Postoperative Plan:  The patient will be anticoagulated with 81mg aspirin twice daily for one month.   They will receive 24 hours of post-operative antibiotics.    Activity will be weight bearing as tolerated.    Signed by: Zack Patton III, MD

## 2020-06-08 NOTE — PROGRESS NOTES
Dr. Bui notified patient still 6/10 pain to anterior knee at this time, previous pain was posterior and lateral.    OK to give oxy 5 mg in addition to 5 mg already admin in pacu to equal 10 mg of post op order.

## 2020-06-08 NOTE — ANESTHESIA POSTPROCEDURE EVALUATION
Anesthesia Post Evaluation    Patient: Aurora Castle    Procedure(s) Performed: Procedure(s) (LRB):  ARTHROPLASTY, KNEE, TOTAL-DEPUY-SIGMA/DEPUY-TC3/MBT back up (Right)    Final Anesthesia Type: CSE    Patient location during evaluation: PACU  Patient participation: Yes- Able to Participate  Level of consciousness: awake and alert and oriented  Post-procedure vital signs: reviewed and stable  Pain management: adequate  Airway patency: patent    PONV status at discharge: No PONV  Anesthetic complications: no      Cardiovascular status: hemodynamically stable  Respiratory status: nasal cannula  Hydration status: euvolemic  Follow-up not needed.          Vitals Value Taken Time   /69 6/8/2020  3:34 PM   Temp 36.8 °C (98.2 °F) 6/8/2020  3:30 PM   Pulse 50 6/8/2020  3:57 PM   Resp 14 6/8/2020  3:56 PM   SpO2 100 % 6/8/2020  3:57 PM   Vitals shown include unvalidated device data.      No case tracking events are documented in the log.      Pain/Shena Score: Pain Rating Prior to Med Admin: 6 (6/8/2020  3:38 PM)  Pain Rating Post Med Admin: 5 (6/8/2020  3:30 PM)  Shena Score: 10 (6/8/2020  3:12 PM)

## 2020-06-08 NOTE — PLAN OF CARE
Pt calm, states ready for surgery. Anesthesia consent pending. Pt's belongings in locker #5, 1 bag. Pt states spoke to Dr Patton about preferring walker over crutches post-op. Pt does not have her own walker.

## 2020-06-08 NOTE — TRANSFER OF CARE
"Anesthesia Transfer of Care Note    Patient: Aurora Castle    Procedure(s) Performed: Procedure(s) (LRB):  ARTHROPLASTY, KNEE, TOTAL-DEPUY-SIGMA/DEPUY-TC3/MBT back up (Right)    Patient location: PACU    Anesthesia Type: CSE and general    Transport from OR: Transported from OR on room air with adequate spontaneous ventilation    Post pain: adequate analgesia    Post assessment: no apparent anesthetic complications and tolerated procedure well    Post vital signs: stable    Level of consciousness: awake and sedated    Nausea/Vomiting: no nausea/vomiting    Complications: none    Transfer of care protocol was followed      Last vitals:   Visit Vitals  BP (!) 145/71 (BP Location: Left arm, Patient Position: Lying)   Pulse 60   Temp 36.7 °C (98 °F)   Resp 16   Ht 5' 10" (1.778 m)   Wt 77.1 kg (170 lb)   LMP  (Approximate)   SpO2 100%   Breastfeeding? No   BMI 24.39 kg/m²     "

## 2020-06-09 VITALS
BODY MASS INDEX: 24.34 KG/M2 | HEIGHT: 70 IN | DIASTOLIC BLOOD PRESSURE: 65 MMHG | TEMPERATURE: 98 F | RESPIRATION RATE: 16 BRPM | HEART RATE: 52 BPM | WEIGHT: 170 LBS | OXYGEN SATURATION: 99 % | SYSTOLIC BLOOD PRESSURE: 119 MMHG

## 2020-06-09 PROCEDURE — 99900035 HC TECH TIME PER 15 MIN (STAT)

## 2020-06-09 PROCEDURE — 63600175 PHARM REV CODE 636 W HCPCS: Performed by: PHYSICIAN ASSISTANT

## 2020-06-09 PROCEDURE — 94761 N-INVAS EAR/PLS OXIMETRY MLT: CPT

## 2020-06-09 PROCEDURE — 94799 UNLISTED PULMONARY SVC/PX: CPT

## 2020-06-09 PROCEDURE — 25000003 PHARM REV CODE 250: Performed by: PHYSICIAN ASSISTANT

## 2020-06-09 PROCEDURE — 99213 OFFICE O/P EST LOW 20 MIN: CPT | Mod: ,,, | Performed by: ANESTHESIOLOGY

## 2020-06-09 PROCEDURE — 97530 THERAPEUTIC ACTIVITIES: CPT

## 2020-06-09 PROCEDURE — 99213 PR OFFICE/OUTPT VISIT, EST, LEVL III, 20-29 MIN: ICD-10-PCS | Mod: ,,, | Performed by: ANESTHESIOLOGY

## 2020-06-09 PROCEDURE — 97110 THERAPEUTIC EXERCISES: CPT

## 2020-06-09 PROCEDURE — 97116 GAIT TRAINING THERAPY: CPT

## 2020-06-09 PROCEDURE — 97535 SELF CARE MNGMENT TRAINING: CPT

## 2020-06-09 RX ADMIN — ROPIVACAINE HYDROCHLORIDE 8 ML/HR: 2 INJECTION, SOLUTION EPIDURAL; INFILTRATION at 02:06

## 2020-06-09 RX ADMIN — ACETAMINOPHEN 1000 MG: 500 TABLET ORAL at 06:06

## 2020-06-09 RX ADMIN — DOCUSATE SODIUM - SENNOSIDES 1 TABLET: 50; 8.6 TABLET, FILM COATED ORAL at 08:06

## 2020-06-09 RX ADMIN — POLYETHYLENE GLYCOL (3350) 17 G: 17 POWDER, FOR SOLUTION ORAL at 08:06

## 2020-06-09 RX ADMIN — CELECOXIB 200 MG: 200 CAPSULE ORAL at 08:06

## 2020-06-09 RX ADMIN — FAMOTIDINE 20 MG: 20 TABLET, FILM COATED ORAL at 08:06

## 2020-06-09 RX ADMIN — OXYCODONE 5 MG: 5 TABLET ORAL at 08:06

## 2020-06-09 RX ADMIN — ACETAMINOPHEN 1000 MG: 500 TABLET ORAL at 12:06

## 2020-06-09 RX ADMIN — MUPIROCIN 1 G: 20 OINTMENT TOPICAL at 08:06

## 2020-06-09 RX ADMIN — ASPIRIN 81 MG: 81 TABLET, COATED ORAL at 08:06

## 2020-06-09 RX ADMIN — CEFAZOLIN 2 G: 330 INJECTION, POWDER, FOR SOLUTION INTRAMUSCULAR; INTRAVENOUS at 04:06

## 2020-06-09 NOTE — PLAN OF CARE
Patient A/Ox4. Patient in bed resting at this time. Bed locked and in the lowest position. Patient instructed to call for assistance. Call light in reach. Q2 hour rounds made. Noted 20 g to left hand infusing NS at 150, site CDI. Polar Ice in place, place on the right knee. Ropivacaine infusing 8ml/hr. Site CDI. Dressing to the right knee is CDI.  Walker and commode at the bedside. Patient up to the restroom as tolerated. Pain managed with prn pain medication. Safety maintained, no fall noted. Will continue to observe and follow the current plan of care.

## 2020-06-09 NOTE — PROGRESS NOTES
[facility]  Orthopedics  Progress Note    Patient Name: Aurora Castle  MRN: [id number]  Admission Date: 6/8/2020  Hospital Length of Stay: 0 days  Attending Provider: Zack Patton III, MD  Primary Care Provider: Isabelle Vincent MD  Follow-up For: Procedure(s) (LRB):  ARTHROPLASTY, KNEE, TOTAL-DEPUY-SIGMA/DEPUY-TC3/MBT back up (Right)    Post-Operative Day: 1 Day Post-Op  Subjective:     Principal Problem:Primary osteoarthritis of right knee    Principal Orthopedic Problem: same    Interval History: Patient seen and examined at bedside. Day 1 s/p right tka.   No acute events overnight.  Pain controlled.  Comfortable.       Review of patient's allergies indicates:   Allergen Reactions    Erythromycin Itching and Swelling     Eye swelling   Local eye ointment       Current Facility-Administered Medications   Medication    0.9%  NaCl infusion    acetaminophen tablet 1,000 mg    aspirin EC tablet 81 mg    bisacodyL suppository 10 mg    celecoxib capsule 200 mg    famotidine tablet 20 mg    morphine injection 2 mg    mupirocin 2 % ointment 1 g    naloxone 0.4 mg/mL injection 0.02 mg    ondansetron injection 4 mg    oxyCODONE immediate release tablet 10 mg    oxyCODONE immediate release tablet 5 mg    polyethylene glycol packet 17 g    pregabalin capsule 75 mg    promethazine (PHENERGAN) 6.25 mg in dextrose 5 % 50 mL IVPB    ropivacaine (PF) 2 mg/ml (0.2%) infusion    ropivacaine 0.2% ON-Q C-BLOC 400 ML (SELECT A FLOW)    senna-docusate 8.6-50 mg per tablet 1 tablet     Objective:     Vital Signs (Most Recent):  Temp: 97.6 °F (36.4 °C) (06/09/20 0700)  Pulse: 75 (06/09/20 0700)  Resp: 16 (06/09/20 0700)  BP: 119/65 (06/09/20 0700)  SpO2: 98 % (06/09/20 0700) Vital Signs (24h Range):  Temp:  [96.8 °F (36 °C)-98.2 °F (36.8 °C)] 97.6 °F (36.4 °C)  Pulse:  [48-78] 75  Resp:  [12-23] 16  SpO2:  [95 %-100 %] 98 %  BP: (112-169)/(56-87) 119/65     Weight: 77.1 kg (170 lb)  Height: 5'  "10" (177.8 cm)  Body mass index is 24.39 kg/m².      Intake/Output Summary (Last 24 hours) at 6/9/2020 0707  Last data filed at 6/9/2020 0600  Gross per 24 hour   Intake 4708.6 ml   Output --   Net 4708.6 ml       Ortho/SPM Exam  Physical Exam:  NAD, A/O x 3.  Wound c/d/i with clean dressing.  No focal motor or sensory deficits noted.  Able to perform straight leg raise.     Significant Labs: All pertinent labs within the past 24 hours have been reviewed.    Significant Imaging: I have reviewed and interpreted all pertinent imaging results/findings.    Assessment/Plan:     * Primary osteoarthritis of right knee  Aurora Castle is a 74 y.o. female POD 1 s/p right tka doing very well.        - Weight bearing status: wbat  - Pain control: Per APS  - Antibiotics: po complete  - DVT Prophylaxis: asa 81 bid , SCD's at all times when not ambulating.  - PT/OT  - Labs:   - Lines/Drains: none  - Dispo: DC to home today                Eriberto Toscano MD  Orthopedics  [facility]  "

## 2020-06-09 NOTE — PT/OT/SLP PROGRESS
Occupational Therapy   Treatment    Name: Aurora Castle  MRN: [id number]  Admitting Diagnosis:  Primary osteoarthritis of right knee  1 Day Post-Op    Recommendations:     Discharge Recommendations: home  Discharge Equipment Recommendations:  bedside commode, walker, rolling  Barriers to discharge:       Assessment:     Aurora Castle is a 74 y.o. female with a medical diagnosis of Primary osteoarthritis of right knee.  Patient s/p right TKA. She demonstrated MOD I with ADLs and ADL transfers during session today. She is functioning at/near baseline level of function s/p surgery for ADLs and ADL transfers.      Rehab Prognosis:  Good; patient would benefit from acute skilled OT services to address these deficits and reach maximum level of function.       Plan:     · DC from OT     Subjective     Patient reported her breakfast was good.   Pain/Comfort:  · Pain Rating 1: 1/10  · Location - Side 1: Right  · Location 1: knee  · Pain Addressed 1: Reposition, Distraction    Objective:     Communicated with: RN prior to session.  Patient found supine with cryotherapy, FCD, perineural catheter upon OT entry to room.    General Precautions: Standard, fall   Orthopedic Precautions:RLE weight bearing as tolerated   Braces: N/A     Occupational Performance:     Bed Mobility:    · Patient completed Supine to Sit with modified independence     Functional Mobility/Transfers:  · Patient completed Sit <> Stand Transfer with modified independence  with  rolling walker   · Patient completed Toilet Transfer Step Transfer technique with modified independence with  rolling walker   · Patient completed chair transfer with step transfer technique with modified independence with rolling walker  · Functional Mobility: patient ambulated to/from bathroom with use of rolling walker at modified independence     Activities of Daily Living:  · Grooming: modified independence standing at sink to wash hands  · Upper Body Dressing: modified  independence sitting in chair to don bra and overhead dress  · Lower Body Dressing: modified independence to don underwear and shoes       [facility] 6 Click ADL: 24    Treatment & Education:  -Patient educated to dress surgical side first and further dressing techniques s/p surgery   -Patient educated on hand placement for transfers   -Patient educated on rolling walker placement for ADLs  -Patient educated on polar ice use  -Patient educated on role of OT and plan of care     Patient left up in chair with all lines intact, call button in reach and RN notifiedEducation:      GOALS:   Multidisciplinary Problems     Occupational Therapy Goals     Not on file          Multidisciplinary Problems (Resolved)        Problem: Occupational Therapy Goal    Goal Priority Disciplines Outcome Interventions   Occupational Therapy Goal   (Resolved)     OT, PT/OT Met    Description:  Goals to be met by: 6-15-20    Patient will increase functional independence with ADLs by performing:    UE Dressing with Butts.  LE Dressing with Modified [place].  Grooming while standing at sink with Modified [place].  Toileting from toilet with Modified [place] for hygiene and clothing management.   Toilet transfer to toilet with Modified [place].                      Time Tracking:     OT Date of Treatment: 06/09/20  OT Start Time: 0853  OT Stop Time: 0916  OT Total Time (min): 23 min    Billable Minutes:Self Care/Home Management 23    Raquel Casillas OT  6/9/2020

## 2020-06-09 NOTE — PLAN OF CARE
Problem: Occupational Therapy Goal  Goal: Occupational Therapy Goal  Description  Goals to be met by: 6-15-20    Patient will increase functional independence with ADLs by performing:    UE Dressing with [place].  LE Dressing with Modified [place].  Grooming while standing at sink with Modified [place].  Toileting from toilet with Modified [place] for hygiene and clothing management.   Toilet transfer to toilet with Modified [place].     Outcome: Met    Goals met  Raquel Casillas OT  6/9/2020

## 2020-06-09 NOTE — DISCHARGE SUMMARY
[facility]  Orthopedics  Discharge Summary      Patient Name: Aurora Castle  MRN: [id number]  Admission Date: 6/8/2020  Hospital Length of Stay: 0 days  Discharge Date and Time: No discharge date for patient encounter.  Attending Physician: Zack Patton III, MD   Discharging Provider: Eriberto Toscano MD  Primary Care Provider: Isabelle Vincent MD    HPI: here for elective tka    Procedure(s) (LRB):  ARTHROPLASTY, KNEE, TOTAL-DEPUY-SIGMA/DEPUY-TC3/MBT back up (Right)      Hospital Course: Patient presented for above procedure.  Tolerated it well and was discharged home POD 1 after voiding, tolerating diet, ambulating, pain controlled.  Discharge instructions, follow-up appointment, and med rec are below.      Consults (From admission, onward)        Status Ordering Provider     Inpatient consult to Pain Management  Once     Provider:  (Not yet assigned)    Acknowledged AIDA BERMAN     Inpatient consult to Respiratory Care  Once     Provider:  (Not yet assigned)    Acknowledged AIDA BERMAN     Inpatient consult to Social Work  Once     Provider:  (Not yet assigned)    Acknowledged AIDA BERMAN          Significant Diagnostic Studies: No pertinent studies.    Pending Diagnostic Studies:     None        Final Active Diagnoses:    Diagnosis Date Noted POA    PRINCIPAL PROBLEM:  Primary osteoarthritis of right knee [M17.11] 02/22/2016 Yes      Problems Resolved During this Admission:      Discharged Condition: stable    Disposition: Home or Self Care    Follow Up:  Follow-up Information     Follow up In 2 weeks.               Patient Instructions:      Call MD for:  temperature >100.4     Call MD for:  persistent nausea and vomiting or diarrhea     Call MD for:  severe uncontrolled pain     Call MD for:  redness, tenderness, or signs of infection (pain, swelling, redness, odor or green/yellow discharge around incision site)     Call MD for:  difficulty breathing or increased  cough     Call MD for:  severe persistent headache     Call MD for:  worsening rash     Call MD for:  persistent dizziness, light-headedness, or visual disturbances     Call MD for:  increased confusion or weakness     Activity as tolerated     Call MD for:  difficulty breathing, headache or visual disturbances     Call MD for:  extreme fatigue     Call MD for:  hives     Call MD for:  persistent dizziness or light-headedness     Call MD for:  persistent nausea and vomiting     Call MD for:  redness, tenderness, or signs of infection (pain, swelling, redness, odor or green/yellow discharge around incision site)     Call MD for:  severe uncontrolled pain     Call MD for:  temperature >100.4     Keep surgical extremity elevated     Leave dressing on - Keep it clean, dry, and intact until clinic visit   Order Comments: Do not remove surgical dressing for 2 weeks post-op. This will be done only by MD at initial post-op visit. If dressing is completely saturated, replace with identical dressing - silver-impregnated hydrocolloid dressing.     Do not get dressings wet. Do not shower.     If dressing continues to be saturated or there are signs of infection, please call [facility] [phone] for further instructions and to make appt to be seen.     Lifting restrictions   Order Comments: No strenuous exercise or lifting of > 10 lbs     No driving, operating heavy equipment or signing legal documents while taking pain medication     Sponge bath only until clinic visit     Weight bearing as tolerated     Medications:  Reconciled Home Medications:      Medication List      CHANGE how you take these medications    cholecalciferol (vitamin D3) 125 mcg (5,000 unit) Tab  Commonly known as:  VITAMIN D3  Take 1 tablet by mouth once daily.  What changed:  additional instructions        CONTINUE taking these medications    aspirin 81 MG EC tablet  Commonly known as:  ECOTRIN  Take 1 tablet (81 mg total) by mouth 2 (two) times  daily.     BIOTIN ORAL  Take 5,000 mcg by mouth once daily. Hold medication one week prior to surgery     celecoxib 200 MG capsule  Commonly known as:  CeleBREX  Take 1 capsule (200 mg total) by mouth once daily.     fish oil-omega-3 fatty acids 300-1,000 mg capsule  Take 2 g by mouth once daily. Hold medication one week prior to surgery     GLUCOSAMINE-CHONDROITIN COMPLX 010-531-93-2.5 mg Cap  Generic drug:  glucosamine-chondroit-vit C-Mn  Take 1 capsule by mouth once daily. Hold medication one week prior to surgery     MAPAP ARTHRITIS PAIN 650 MG Tbsr  Generic drug:  acetaminophen  Take 1 tablet (650 mg total) by mouth every 8 (eight) hours as needed.     MELATONIN ORAL  Take by mouth every evening. TAKES AT NIGHT     MULTIVITAMIN, MINERAL FORMULA tablet  Generic drug:  geriatric multivitamin-min  Take 1 tablet by mouth Daily. Hold medication one week prior to surgery     naproxen sodium 220 MG tablet  Commonly known as:  ANAPROX  Take 440 mg by mouth 2 (two) times daily with meals. Hold medication one week prior to surgery     OCUVITE ORAL  Take 1 tablet by mouth once daily. Hold medication one week prior to surgery     ondansetron 8 MG Tbdl  Commonly known as:  ZOFRAN-ODT  Dissolve 1 tablet (8 mg total) by mouth every 12 (twelve) hours as needed (nausea).     oxyCODONE 5 MG immediate release tablet  Commonly known as:  ROXICODONE  Take 1-2 tabs by mouth every 4-6 hours as needed for pain     STOOL SOFTENER 100 MG capsule  Generic drug:  docusate sodium  Take 1 capsule (100 mg total) by mouth 2 (two) times daily as needed for Constipation.     UNISOM (DOXYLAMINE) ORAL  Take by mouth every evening. TAKES AT NIGHT     VITAMIN C 1000 MG tablet  Generic drug:  ascorbic acid (vitamin C)  Take 1 tablet by mouth Daily. Hold medication one week prior to surgery            Eriberto Toscano MD  Orthopedics  [facility]

## 2020-06-09 NOTE — NURSING
IV removed. Discharge instructions, follow up given to and explained to patient. PNC swapped to On Q at 8ml/hr. Patient and  educated on On Q. All questions acknowledged. Patient discharged to home with .

## 2020-06-09 NOTE — SUBJECTIVE & OBJECTIVE
"Principal Problem:Primary osteoarthritis of right knee    Principal Orthopedic Problem: same    Interval History: Patient seen and examined at bedside. Day 1 s/p right tka.   No acute events overnight.  Pain controlled.  Comfortable.       Review of patient's allergies indicates:   Allergen Reactions    Erythromycin Itching and Swelling     Eye swelling   Local eye ointment       Current Facility-Administered Medications   Medication    0.9%  NaCl infusion    acetaminophen tablet 1,000 mg    aspirin EC tablet 81 mg    bisacodyL suppository 10 mg    celecoxib capsule 200 mg    famotidine tablet 20 mg    morphine injection 2 mg    mupirocin 2 % ointment 1 g    naloxone 0.4 mg/mL injection 0.02 mg    ondansetron injection 4 mg    oxyCODONE immediate release tablet 10 mg    oxyCODONE immediate release tablet 5 mg    polyethylene glycol packet 17 g    pregabalin capsule 75 mg    promethazine (PHENERGAN) 6.25 mg in dextrose 5 % 50 mL IVPB    ropivacaine (PF) 2 mg/ml (0.2%) infusion    ropivacaine 0.2% ON-Q C-BLOC 400 ML (SELECT A FLOW)    senna-docusate 8.6-50 mg per tablet 1 tablet     Objective:     Vital Signs (Most Recent):  Temp: 97.6 °F (36.4 °C) (06/09/20 0700)  Pulse: 75 (06/09/20 0700)  Resp: 16 (06/09/20 0700)  BP: 119/65 (06/09/20 0700)  SpO2: 98 % (06/09/20 0700) Vital Signs (24h Range):  Temp:  [96.8 °F (36 °C)-98.2 °F (36.8 °C)] 97.6 °F (36.4 °C)  Pulse:  [48-78] 75  Resp:  [12-23] 16  SpO2:  [95 %-100 %] 98 %  BP: (112-169)/(56-87) 119/65     Weight: 77.1 kg (170 lb)  Height: 5' 10" (177.8 cm)  Body mass index is 24.39 kg/m².      Intake/Output Summary (Last 24 hours) at 6/9/2020 0707  Last data filed at 6/9/2020 0600  Gross per 24 hour   Intake 4708.6 ml   Output --   Net 4708.6 ml       Ortho/SPM Exam  Physical Exam:  NAD, A/O x 3.  Wound c/d/i with clean dressing.  No focal motor or sensory deficits noted.  Able to perform straight leg raise.     Significant Labs: All pertinent labs " within the past 24 hours have been reviewed.    Significant Imaging: I have reviewed and interpreted all pertinent imaging results/findings.

## 2020-06-09 NOTE — ASSESSMENT & PLAN NOTE
Aurora Castle is a 74 y.o. female POD 1 s/p right tka doing very well.        - Weight bearing status: wbat  - Pain control: Per APS  - Antibiotics: po complete  - DVT Prophylaxis: asa 81 bid , SCD's at all times when not ambulating.  - PT/OT  - Labs:   - Lines/Drains: none  - Dispo: DC to home today

## 2020-06-09 NOTE — PROGRESS NOTES
Acute Pain Service and Perioperative Surgical Home Progress Note      I personally evaluated this patient this morning.  Alert and Oriented x 4.  Pain has been well controlled since surgery.  She is in good spirits.  No complaints of back pain or headache with standing.  Nerve block dressings intact and infusing.  I agree with the below assessment and plan.    Rosendo Yun MD      HPI  Aurora Castle is a 74 y.o., female,   Past Medical History:   Diagnosis Date    Arthritis     History of adenomatous polyp of colon     Joint pain     Pneumonia     during college     Tendonitis of ankle or foot     Trouble in sleeping     Urinary incontinence    presents without any medical complaints at this time. Ate dinner last night without incident, ambulated with physical therapy yesterday without incident, and reports feeling fine.    Interval history      Surgery:  Procedure(s) (LRB):  ARTHROPLASTY, KNEE, TOTAL-DEPUY-SIGMA/DEPUY-TC3/MBT back up (Right)    Post Op Day #: 1    Catheter type: Perineural Adductor Canal    Infusion type: Ropivacaine 0.2%  8 ml/hr basal    Problem List:    Active Hospital Problems    Diagnosis  POA    *Primary osteoarthritis of right knee [M17.11]  Yes      Resolved Hospital Problems   No resolved problems to display.       Subjective:       General appearance of alert, oriented, no complaints   Pain with rest: 6    Numbers   Pain with movement: 7    Numbers   Side Effects    1. Pruritis No    2. Nausea No    3. Motor Blockade No, 0=Ability to raise lower extremities off bed    4. Sedation No, S=sleep, easy to arouse    Schedule Medications:    acetaminophen  1,000 mg Oral Q6H    aspirin  81 mg Oral BID    celecoxib  200 mg Oral Daily    famotidine  20 mg Oral BID    mupirocin  1 g Nasal BID    polyethylene glycol  17 g Oral Daily    pregabalin  75 mg Oral QHS    senna-docusate 8.6-50 mg  1 tablet Oral BID        Continuous Infusions:   sodium chloride 0.9% 150 mL/hr at  06/08/20 1426    ropivacaine (PF) 2 mg/ml (0.2%) 8 mL/hr (06/09/20 0237)    ropivacaine          PRN Medications:  bisacodyL, morphine, naloxone, ondansetron, oxyCODONE, oxyCODONE, promethazine (PHENERGAN) IVPB, ropivacaine       Antibiotics:  Antibiotics (From admission, onward)    Start     Stop Route Frequency Ordered    06/08/20 2100  mupirocin 2 % ointment 1 g      06/13 2059 Nasl 2 times daily 06/08/20 1659             Objective:     Catheter site clean, dry, intact          Vital Signs (Most Recent):  Temp: 36.4 °C (97.5 °F) (06/09/20 0408)  Pulse: 72 (06/09/20 0408)  Resp: 16 (06/09/20 0408)  BP: 115/67 (06/09/20 0408)  SpO2: 97 % (06/09/20 0408) Vital Signs Range (Last 24H):  Temp:  [36 °C (96.8 °F)-36.8 °C (98.2 °F)]   Pulse:  [48-78]   Resp:  [12-23]   BP: (112-169)/(56-87)   SpO2:  [95 %-100 %]          I & O (Last 24H):    Intake/Output Summary (Last 24 hours) at 6/9/2020 0552  Last data filed at 6/8/2020 1757  Gross per 24 hour   Intake 2970.6 ml   Output --   Net 2970.6 ml       Physical Exam:    GA: Alert, comfortable, no acute distress.   Pulmonary: Clear to auscultation A/P/L. No wheezing, crackles, or rhonchi.  Cardiac: RRR S1 & S2 w/o rubs/murmurs/gallops.   Abdominal:Bowel sounds present. No tenderness to palpation or distension. No appreciable hepatosplenomegaly.   Skin: No jaundice, rashes, or visible lesions.         Laboratory:  CBC: No results for input(s): WBC, RBC, HGB, HCT, PLT, MCV, MCH, MCHC in the last 72 hours.    BMP: No results for input(s): NA, K, CO2, CL, BUN, CREATININE, GLU, MG, PHOS, CALCIUM in the last 72 hours.    No results for input(s): PT, INR, PROTIME, APTT in the last 72 hours.      Anticoagulant dose ASA at 81 mg.    Assessment:         Pain control adequate    Plan:     1) Pain:    Adductor canal perineural catheter in place and infusing. Good level. Multimodal pain regimen with acetaminophen, Celebrex, Lyrica, and prn oxycodone given  Will continue to monitor. Plan  to discharge with On-Q on POD #1.   2) FEN/GI: Tolerating liquids, advance diet as tolerated. No flatus. No BM.   3) Dispo: Pt working well with PT/OT. Case management and SW for placement. Plan for discharge POD #1.        Evaluator Toussaint Battley III, FNP

## 2020-06-09 NOTE — PT/OT/SLP PROGRESS
Physical Therapy Treatment and Discharge     Patient Name:  Aurora Castle   MRN:  [id number]    Recommendations:     Discharge Recommendations:  outpatient PT(6/10/2020)   Discharge Equipment Recommendations: bedside commode, walker, rolling   Barriers to discharge: None    Assessment:     Aurora Castle is a 74 y.o. female admitted with a medical diagnosis of Primary osteoarthritis of right knee.  She presents with the following impairments/functional limitations:  weakness, impaired functional mobilty, gait instability, impaired balance, decreased lower extremity function, decreased ROM, impaired joint extensibility, pain, impaired skin, orthopedic precautions. Patient tolerated PT session well. She ambulated 200ft x2 trials with RW and supervision. She ascended/descended 8 steps with right handrail and supervision. She performed R LE therex 2x10. She is ready to discharge home from PT standpoint.     Rehab Prognosis: Good; patient would benefit from acute skilled PT services to address these deficits and reach maximum level of function.    Recent Surgery: Procedure(s) (LRB):  ARTHROPLASTY, KNEE, TOTAL-DEPUY-SIGMA/DEPUY-TC3/MBT back up (Right) 1 Day Post-Op    Plan:     During this hospitalization, patient to be seen daily to address the identified rehab impairments via gait training, therapeutic activities, therapeutic exercises and progress toward the following goals:    · Plan of Care Expires:  06/12/20    Subjective     Subjective: Surprised by how little pain she is having in her knee.   Patient/Family Comments/goals: To walk without pain and get back to riding her bike.   Pain/Comfort:  · Pain Rating 1: 1/10  · Location - Side 1: Right  · Location - Orientation 1: generalized  · Location 1: knee  · Pain Addressed 1: Reposition, Distraction, Cessation of Activity      Objective:     Communicated with RN prior to session.  Patient found up in chair with cryotherapy, perineural catheter upon PT entry to  room.     General Precautions: Standard, fall   Orthopedic Precautions:RLE weight bearing as tolerated   Braces: N/A     Functional Mobility:  · Mat Mobility:     · Supine to Sit: supervision  · Sit to Supine: supervision  · Transfers:     · Sit to Stand:  supervision with rolling walker x1 from bedside chair and x1 from mat with verbal cues for hand placement   · Gait: Patient ambulated 200ft x2 trials with Rolling Walker and supervision using swing-through gait. Patient demonstrated decreased chapo and decreased step length during gait due to pain, decreased ROM and decreased strength. Verbal cues provided for RW management.   · Stairs:  Pt ascended/descended 8 stair(s) with right handrail and supervision. Visual demonstration and verbal cues provided to patient.       AM-PAC 6 CLICK MOBILITY  Turning over in bed (including adjusting bedclothes, sheets and blankets)?: 4  Sitting down on and standing up from a chair with arms (e.g., wheelchair, bedside commode, etc.): 4  Moving from lying on back to sitting on the side of the bed?: 4  Moving to and from a bed to a chair (including a wheelchair)?: 4  Need to walk in hospital room?: 4  Climbing 3-5 steps with a railing?: 4  Basic Mobility Total Score: 24       Therapeutic Activities and Exercises:  Patient educated in and performed R LE exercises 2x10 for ankle pumps, quad set, glute set, SAQ over bolster, heel slides, hip abd/add, SLR, and LAQ.      Patient educated in:  -PT role and POC  -safety with transfers including hand placement  -gait sequencing and RW management  -OOB activity to maximize recovery and prevent DVT at home  -van transfer  -stair training  -HEP for therex at home with handout provided       Patient left up in chair with all lines intact, call button in reach,  present, and RN notified.    GOALS:   Multidisciplinary Problems     Physical Therapy Goals     Not on file          Multidisciplinary Problems (Resolved)        Problem:  Physical Therapy Goal    Goal Priority Disciplines Outcome Goal Variances Interventions   Physical Therapy Goal   (Resolved)     PT, PT/OT Met     Description:  Goals to be met by: 2020    Patient will increase functional independence with mobility by performin. Supine to sit with Modified [place]  2. Sit to stand transfer with Modified [place]  3. Gait x300 feet with Modified [place] using Rolling Walker  4. Ascend/descend 8 stairs with right handrails and Stand-by Assistance   5. Lower extremity exercise program x30 reps per handout, with supervision                      Time Tracking:     PT Received On: 20  PT Start Time: 1000     PT Stop Time: 1038  PT Total Time (min): 38 min     Billable Minutes: Gait Training 15 Therapeutic Activity 8 and Therapeutic Exercise 15    Treatment Type: Treatment  PT/PTA: PT       Lizz Klein, PT  2020

## 2020-06-09 NOTE — PLAN OF CARE
Per [facility], the patient has an outpatient therapy appointment at [facility] on 6/10.  RW and BSC provided.

## 2020-06-09 NOTE — PLAN OF CARE
Patient tolerated PT session well. She ambulated 200ft x2 trials with RW and supervision. She ascended/descended 8 steps with right handrail and supervision. She performed R LE therex 2x10. She is ready to discharge home from PT standpoint.     Problem: Physical Therapy Goal  Goal: Physical Therapy Goal  Description  Goals to be met by: 2020    Patient will increase functional independence with mobility by performin. Supine to sit with Modified [place]  2. Sit to stand transfer with Modified [place]  3. Gait x300 feet with Modified [place] using Rolling Walker  4. Ascend/descend 8 stairs with right handrails and Stand-by Assistance   5. Lower extremity exercise program x30 reps per handout, with supervision     Outcome: Met

## 2020-06-10 NOTE — NURSING
6/10-4p- spoke to pt at home. Reports pain well controlled with On-Q. Patient denies signs of local anesthetic toxicity. PNC dressing remains clean, dry, and intact. All questions answered. Encouraged to call if any issues arise.

## 2020-06-11 NOTE — NURSING
6/11-Patient called at home.   lDiscussed discontinuation of perineural catheter with patient.  Patient instructed to take something for pain before removing catheter. Reports  Site clean and dry.  All questions answered.

## 2020-06-15 ENCOUNTER — TELEPHONE (OUTPATIENT)
Dept: ORTHOPEDICS | Facility: CLINIC | Age: 75
End: 2020-06-15

## 2020-06-15 DIAGNOSIS — Z96.651 STATUS POST TOTAL RIGHT KNEE REPLACEMENT: ICD-10-CM

## 2020-06-15 DIAGNOSIS — Z96.651 STATUS POST TOTAL RIGHT KNEE REPLACEMENT: Primary | ICD-10-CM

## 2020-06-15 RX ORDER — OXYCODONE HYDROCHLORIDE 5 MG/1
TABLET ORAL
Qty: 50 TABLET | Refills: 0 | Status: CANCELLED | OUTPATIENT
Start: 2020-06-15

## 2020-06-15 RX ORDER — OXYCODONE HYDROCHLORIDE 5 MG/1
TABLET ORAL
Qty: 28 TABLET | Refills: 0 | Status: SHIPPED | OUTPATIENT
Start: 2020-06-15 | End: 2020-10-14

## 2020-06-15 NOTE — TELEPHONE ENCOUNTER
I called the patient back regarding her voice message. I informed the patient that  Rimaany filled her prescription and is at the pharmacy she requested. The patient verbalized understanding and has no further questions.    ----- Message from Emily Márquez PA-C sent at 6/15/2020 12:07 PM CDT -----  Regarding: RE: self  Done.   ----- Message -----  From: Dennis Vance MA  Sent: 6/15/2020  11:42 AM CDT  To: Emily Márquez PA-C  Subject: FW: self                                           ----- Message -----  From: Suellen Muprhy  Sent: 6/15/2020  11:36 AM CDT  To: Abdi GRIJALVA Staff  Subject: self                                             Pt is calling about her refill on medication     oxyCODONE (ROXICODONE) 5 MG immediate release tablet    [facility] [address]      Contact info [phone]

## 2020-06-15 NOTE — TELEPHONE ENCOUNTER
Spoke to patient. Refilled pain mediation. Discussed constipation. She will cut back on pain meds, continue colace, increase fluids and fiber. She has taken miralax and suppository. This is ok. She notices some RUE weakness since surgery. No paresthesias, neck pain, injury. Feels like she is pressing down too hard on walker. She will discuss with therapist at [facility] during visit today. She will let me know if it worsens or does not improve. So far has not worsened.

## 2020-06-22 ENCOUNTER — OFFICE VISIT (OUTPATIENT)
Dept: ORTHOPEDICS | Facility: CLINIC | Age: 75
End: 2020-06-22
Payer: MEDICARE

## 2020-06-22 VITALS
DIASTOLIC BLOOD PRESSURE: 73 MMHG | BODY MASS INDEX: 34.43 KG/M2 | TEMPERATURE: 97 F | SYSTOLIC BLOOD PRESSURE: 119 MMHG | HEART RATE: 76 BPM | WEIGHT: 175.38 LBS | HEIGHT: 60 IN

## 2020-06-22 DIAGNOSIS — Z96.651 STATUS POST TOTAL RIGHT KNEE REPLACEMENT: Primary | ICD-10-CM

## 2020-06-22 PROCEDURE — 99999 PR PBB SHADOW E&M-EST. PATIENT-LVL IV: CPT | Mod: PBBFAC,HCNC,, | Performed by: PHYSICIAN ASSISTANT

## 2020-06-22 PROCEDURE — 99999 PR PBB SHADOW E&M-EST. PATIENT-LVL IV: ICD-10-PCS | Mod: PBBFAC,HCNC,, | Performed by: PHYSICIAN ASSISTANT

## 2020-06-22 PROCEDURE — 99024 POSTOP FOLLOW-UP VISIT: CPT | Mod: HCNC,S$GLB,, | Performed by: PHYSICIAN ASSISTANT

## 2020-06-22 PROCEDURE — 99024 PR POST-OP FOLLOW-UP VISIT: ICD-10-PCS | Mod: HCNC,S$GLB,, | Performed by: PHYSICIAN ASSISTANT

## 2020-06-22 RX ORDER — TRAMADOL HYDROCHLORIDE 50 MG/1
50 TABLET ORAL EVERY 6 HOURS PRN
Qty: 28 TABLET | Refills: 0 | Status: SHIPPED | OUTPATIENT
Start: 2020-06-22 | End: 2020-06-29

## 2020-06-22 NOTE — PROGRESS NOTES
Aurora Castle presents for initial post-operative visit following a right total knee arthroplasty performed by Dr. Patton on 6/8/2020. She is doing well. She is in outpatient PT at [facility].    Exam:   Blood pressure 119/73, pulse 76, temperature 97 °F (36.1 °C), temperature source Oral, height 5' (1.524 m), weight 79.5 kg (175 lb 6 oz).   Ambulating well with assistive device.  Incision is clean and dry without drainage or erythema.   ROM:5-90 active, full extension passively   Initial post-operative radiographs reviewed today revealing a well fixed and aligned prosthesis.    A/P:  2 weeks s/p right total knee replacement    - The patient was advised to keep the incision clean and dry for the next 24 hours after which she may wash the area with antibacterial soap in the shower. Will not submerge incision until one month post op.  - Outpatient PT ongoing at [facility]   - Continue ASA for 1 month from surgery.  - Pain medication refilled: tramadol   - Reviewed antibiotic prophylaxis   - Follow up in 4 weeks with Dr. Patton. Pt will call clinic with problems/concerns.

## 2020-07-16 DIAGNOSIS — Z96.651 STATUS POST TOTAL RIGHT KNEE REPLACEMENT: Primary | ICD-10-CM

## 2020-07-21 ENCOUNTER — HOSPITAL ENCOUNTER (OUTPATIENT)
Dept: RADIOLOGY | Facility: HOSPITAL | Age: 75
Discharge: HOME | End: 2020-07-21
Attending: ORTHOPAEDIC SURGERY
Payer: MEDICARE

## 2020-07-21 ENCOUNTER — OFFICE VISIT (OUTPATIENT)
Dept: ORTHOPEDICS | Facility: CLINIC | Age: 75
End: 2020-07-21
Payer: MEDICARE

## 2020-07-21 VITALS — HEIGHT: 60 IN | BODY MASS INDEX: 34.41 KG/M2 | WEIGHT: 175.25 LBS

## 2020-07-21 DIAGNOSIS — Z96.651 STATUS POST TOTAL RIGHT KNEE REPLACEMENT: ICD-10-CM

## 2020-07-21 DIAGNOSIS — Z96.651 STATUS POST TOTAL RIGHT KNEE REPLACEMENT: Primary | ICD-10-CM

## 2020-07-21 PROCEDURE — 73562 XR KNEE 3 VIEW RIGHT: ICD-10-PCS | Mod: 26,HCNC,RT, | Performed by: RADIOLOGY

## 2020-07-21 PROCEDURE — 99024 POSTOP FOLLOW-UP VISIT: CPT | Mod: HCNC,S$GLB,, | Performed by: ORTHOPAEDIC SURGERY

## 2020-07-21 PROCEDURE — 99999 PR PBB SHADOW E&M-EST. PATIENT-LVL III: ICD-10-PCS | Mod: PBBFAC,HCNC,, | Performed by: ORTHOPAEDIC SURGERY

## 2020-07-21 PROCEDURE — 99024 PR POST-OP FOLLOW-UP VISIT: ICD-10-PCS | Mod: HCNC,S$GLB,, | Performed by: ORTHOPAEDIC SURGERY

## 2020-07-21 PROCEDURE — 99999 PR PBB SHADOW E&M-EST. PATIENT-LVL III: CPT | Mod: PBBFAC,HCNC,, | Performed by: ORTHOPAEDIC SURGERY

## 2020-07-21 PROCEDURE — 73562 X-RAY EXAM OF KNEE 3: CPT | Mod: 26,HCNC,RT, | Performed by: RADIOLOGY

## 2020-07-21 PROCEDURE — 73562 X-RAY EXAM OF KNEE 3: CPT | Mod: TC,HCNC,RT

## 2020-07-21 RX ORDER — GABAPENTIN 300 MG/1
300 CAPSULE ORAL 3 TIMES DAILY
Qty: 90 CAPSULE | Refills: 2 | Status: SHIPPED | OUTPATIENT
Start: 2020-07-21 | End: 2020-10-22

## 2020-07-21 RX ORDER — CELECOXIB 200 MG/1
200 CAPSULE ORAL DAILY
Qty: 30 CAPSULE | Refills: 0 | Status: SHIPPED | OUTPATIENT
Start: 2020-07-21 | End: 2020-08-14

## 2020-07-21 NOTE — PROGRESS NOTES
Subjective:     HPI:   Aurora Castle is a 74 y.o. female who presents 6 weeks out from right total knee replacement June 8, 2020    Overall she says she is doing okay.  She tried some tramadol as a transition from the oxycodone it did not help significantly she is now taking Aleve and Tylenol which helps better.  No assistive devices.  Physical therapy is ongoing she says they are working aggressively.     She complains of lots of pins and needles and stabbing pain across the leg and says her whole leg is sore she feels like light touch irritates the leg     Objective:   Body mass index is 34.23 kg/m².  Exam:  Slight limp nonantalgic gait incisions well-healed 10-95 degree knee range of motion 7° valgus alignment knee stable anterior-posterior varus and valgus stresses with flexion contracture but no extensor lag no severe hypersensitivity to light touch today no concerning signs for infection was she has got some global hyperemic changes     Preop range of motion was  with 25° valgus deformity    Imaging:  Indication:  Exam status post right total knee arthroplasty  Exam Ordered: Radiographs of the right knee include a standing anteroposterior view, a lateral view in full flexion, and a sunrise view  Details of Examination: Todays exam show a well fixed, well positioned total knee arthroplasty with no evidence of wear, osteolysis, or loosening.  Impression:  Status post right total knee arthroplasty, implant in good position with no abnormality        Assessment:     Status post total right knee replacement [Z96.651]  Postoperative neuropathic pain     Plan:       Concerned about developing neuropathic pain we discussed that this is more common in my experience with severe valgus knees which are corrected.    I would like her stop physical therapy and let the knee calm down, will try just some gentle home exercises.  She will do topical lidocaine in and Voltaren gel and will send in a prescription for  compounding cream.  We will start some gabapentin and refill her Celebrex prescription and she can add Tylenol to that.    see her back in 2 weeks for range-of-motion check no x-rays    No orders of the defined types were placed in this encounter.      Medications Ordered This Encounter   Medications    celecoxib (CELEBREX) 200 MG capsule     Sig: Take 1 capsule (200 mg total) by mouth once daily.     Dispense:  30 capsule     Refill:  0    gabapentin (NEURONTIN) 300 MG capsule     Sig: Take 1 capsule (300 mg total) by mouth 3 (three) times daily.     Dispense:  90 capsule     Refill:  2        Past Medical History:   Diagnosis Date    Arthritis     History of adenomatous polyp of colon     Joint pain     Pneumonia     during      Tendonitis of ankle or foot     Trouble in sleeping     Urinary incontinence        Past Surgical History:   Procedure Laterality Date     SECTION          COLONOSCOPY N/A 2018    Procedure: COLONOSCOPY;  Surgeon: Jim Borges MD;  Location: [address];  Service: Endoscopy;  Laterality: N/A;    hemorrhiodectomy      tendon surgery L foot      TOTAL KNEE ARTHROPLASTY Right 2020    Procedure: ARTHROPLASTY, KNEE, TOTAL-DEPUY-SIGMA/DEPUY-TC3/MBT back up;  Surgeon: Zack Patton III, MD;  Location: [facility];  Service: Orthopedics;  Laterality: Right;       Family History   Problem Relation Age of Onset    Heart disease Mother 78        MI    Parkinsonism Father     No Known Problems Daughter     Melanoma Neg Hx     Psoriasis Neg Hx     Lupus Neg Hx     Eczema Neg Hx     Acne Neg Hx        Social History     Socioeconomic History    Marital status:      Spouse name: Not on file    Number of children: 1    Years of education: Not on file    Highest education level: Not on file   Occupational History    Occupation: jeweler   Social Needs    Financial resource strain: Not on file    Food insecurity     Worry: Not on file      Inability: Not on file    Transportation needs     Medical: Not on file     Non-medical: Not on file   Tobacco Use    Smoking status: Never Smoker    Smokeless tobacco: Never Used   Substance and Sexual Activity    Alcohol use: Yes     Alcohol/week: 3.0 standard drinks     Types: 3 Glasses of wine per week     Comment: 5 oz of wine nightly    Drug use: No    Sexual activity: Yes     Partners: Male   Lifestyle    Physical activity     Days per week: Not on file     Minutes per session: Not on file    Stress: Not on file   Relationships    Social connections     Talks on phone: Not on file     Gets together: Not on file     Attends Restoration service: Not on file     Active member of club or organization: Not on file     Attends meetings of clubs or organizations: Not on file     Relationship status: Not on file   Other Topics Concern    Are you pregnant or think you may be? No    Breast-feeding No   Social History Narrative    Biking, walking.

## 2020-08-04 ENCOUNTER — OFFICE VISIT (OUTPATIENT)
Dept: ORTHOPEDICS | Facility: CLINIC | Age: 75
End: 2020-08-04
Payer: MEDICARE

## 2020-08-04 VITALS — WEIGHT: 175.25 LBS | BODY MASS INDEX: 34.41 KG/M2 | HEIGHT: 60 IN

## 2020-08-04 DIAGNOSIS — Z96.651 STATUS POST TOTAL RIGHT KNEE REPLACEMENT: Primary | ICD-10-CM

## 2020-08-04 PROCEDURE — 99024 POSTOP FOLLOW-UP VISIT: CPT | Mod: HCNC,S$GLB,, | Performed by: ORTHOPAEDIC SURGERY

## 2020-08-04 PROCEDURE — 99999 PR PBB SHADOW E&M-EST. PATIENT-LVL III: CPT | Mod: PBBFAC,HCNC,, | Performed by: ORTHOPAEDIC SURGERY

## 2020-08-04 PROCEDURE — 99999 PR PBB SHADOW E&M-EST. PATIENT-LVL III: ICD-10-PCS | Mod: PBBFAC,HCNC,, | Performed by: ORTHOPAEDIC SURGERY

## 2020-08-04 PROCEDURE — 99024 PR POST-OP FOLLOW-UP VISIT: ICD-10-PCS | Mod: HCNC,S$GLB,, | Performed by: ORTHOPAEDIC SURGERY

## 2020-08-05 NOTE — PROGRESS NOTES
Subjective:     HPI:   Aurora Castle is a 74 y.o. female who presents now 8 weeks out from right total knee replacement June 8, 2020    He saw her at the 2 week recheck for range of motion check she had significant neuropathic pain and stiffness at last visit.  She had a very stiff knee with significant valgus deformity preoperatively.    She says overall the nerve pain feels better.  No assistive devices.  We held therapy for the past 2 weeks.    On July 21st rib prescribed some topical lidocaine cream some Voltaren gel some compounding cream some gabapentin and Celebrex and Tylenol.    She is overall the nerves are feeling better the gabapentin is helping the knee is not as sore she is doing a knee home exercise conditioning program     Objective:   Body mass index is 34.23 kg/m².  Exam:  Walking much better less limb less antalgia.  Incisions well-healed.   degree knee range of motion significantly less hypersensitivity around the knee    Preop range of motion was  with 25 degree valgus alignment on July 21st she was 10-95      Imaging:  None today      Assessment:     Status post total right knee replacement [Z96.651]  Postop neuropathic pain related to correction of significant valgus deformity and flexion contracture     Plan:       Overall I think she is significantly improved from 2 weeks ago she would like to stay the course hopefully we can avoid manipulation    Recommend cycling at home and he getting in the pool frequently to do continue home exercises continue medications as needed especially the gabapentin  I would hold therapy at this point I think it was making things worse    2 week follow-up for range-of-motion check no x-rays    No orders of the defined types were placed in this encounter.            Past Medical History:   Diagnosis Date    Arthritis     History of adenomatous polyp of colon     Joint pain     Pneumonia     during college     Tendonitis of ankle or foot      Trouble in sleeping     Urinary incontinence        Past Surgical History:   Procedure Laterality Date     SECTION          COLONOSCOPY N/A 2018    Procedure: COLONOSCOPY;  Surgeon: Jim Borges MD;  Location: [address]);  Service: Endoscopy;  Laterality: N/A;    hemorrhiodectomy      tendon surgery L foot      TOTAL KNEE ARTHROPLASTY Right 2020    Procedure: ARTHROPLASTY, KNEE, TOTAL-DEPUY-SIGMA/DEPUY-TC3/MBT back up;  Surgeon: Zack Patton III, MD;  Location: [facility];  Service: Orthopedics;  Laterality: Right;       Family History   Problem Relation Age of Onset    Heart disease Mother 78        MI    Parkinsonism Father     No Known Problems Daughter     Melanoma Neg Hx     Psoriasis Neg Hx     Lupus Neg Hx     Eczema Neg Hx     Acne Neg Hx        Social History     Socioeconomic History    Marital status:      Spouse name: Not on file    Number of children: 1    Years of education: Not on file    Highest education level: Not on file   Occupational History    Occupation: jeweler   Social Needs    Financial resource strain: Not on file    Food insecurity     Worry: Not on file     Inability: Not on file    Transportation needs     Medical: Not on file     Non-medical: Not on file   Tobacco Use    Smoking status: Never Smoker    Smokeless tobacco: Never Used   Substance and Sexual Activity    Alcohol use: Yes     Alcohol/week: 3.0 standard drinks     Types: 3 Glasses of wine per week     Comment: 5 oz of wine nightly    Drug use: No    Sexual activity: Yes     Partners: Male   Lifestyle    Physical activity     Days per week: Not on file     Minutes per session: Not on file    Stress: Not on file   Relationships    Social connections     Talks on phone: Not on file     Gets together: Not on file     Attends Confucianist service: Not on file     Active member of club or organization: Not on file     Attends meetings of clubs or organizations:  Not on file     Relationship status: Not on file   Other Topics Concern    Are you pregnant or think you may be? No    Breast-feeding No   Social History Narrative    Biking, walking.

## 2020-08-07 ENCOUNTER — TELEPHONE (OUTPATIENT)
Dept: INTERNAL MEDICINE | Facility: CLINIC | Age: 75
End: 2020-08-07

## 2020-08-07 DIAGNOSIS — Z00.00 ANNUAL PHYSICAL EXAM: ICD-10-CM

## 2020-08-07 DIAGNOSIS — Z13.6 SCREENING FOR CARDIOVASCULAR CONDITION: Primary | ICD-10-CM

## 2020-08-07 DIAGNOSIS — Z12.31 ENCOUNTER FOR SCREENING MAMMOGRAM FOR BREAST CANCER: Primary | ICD-10-CM

## 2020-08-07 NOTE — TELEPHONE ENCOUNTER
----- Message from Rebecca Guerin sent at 8/7/2020 12:30 PM CDT -----  Contact: Self   Caller is requesting to schedule their annual screening mammogram appointment. Order is not listed in Epic.  Please enter order and contact patient to schedule.  Where would they like the mammogram performed?: breast center  Would the patient rather receive a phone call back or a response via [facility]?:  call back  Additional information:

## 2020-08-07 NOTE — TELEPHONE ENCOUNTER
----- Message from Rebecca Guerin sent at 8/7/2020 12:31 PM CDT -----  Contact: Self   Pt would like to confirm with dr if lab are needed for appt. Please advise

## 2020-08-18 ENCOUNTER — OFFICE VISIT (OUTPATIENT)
Dept: ORTHOPEDICS | Facility: CLINIC | Age: 75
End: 2020-08-18
Payer: MEDICARE

## 2020-08-18 ENCOUNTER — HOSPITAL ENCOUNTER (OUTPATIENT)
Dept: RADIOLOGY | Facility: HOSPITAL | Age: 75
Discharge: HOME | End: 2020-08-18
Attending: INTERNAL MEDICINE
Payer: MEDICARE

## 2020-08-18 VITALS — HEIGHT: 70 IN | WEIGHT: 175.25 LBS | BODY MASS INDEX: 25.09 KG/M2

## 2020-08-18 DIAGNOSIS — Z12.31 ENCOUNTER FOR SCREENING MAMMOGRAM FOR BREAST CANCER: ICD-10-CM

## 2020-08-18 DIAGNOSIS — Z96.651 STATUS POST TOTAL RIGHT KNEE REPLACEMENT: Primary | ICD-10-CM

## 2020-08-18 PROCEDURE — 77067 SCR MAMMO BI INCL CAD: CPT | Mod: 26,HCNC,, | Performed by: RADIOLOGY

## 2020-08-18 PROCEDURE — 77067 SCR MAMMO BI INCL CAD: CPT | Mod: TC,HCNC

## 2020-08-18 PROCEDURE — 99024 PR POST-OP FOLLOW-UP VISIT: ICD-10-PCS | Mod: HCNC,S$GLB,, | Performed by: ORTHOPAEDIC SURGERY

## 2020-08-18 PROCEDURE — 99999 PR PBB SHADOW E&M-EST. PATIENT-LVL III: CPT | Mod: PBBFAC,HCNC,, | Performed by: ORTHOPAEDIC SURGERY

## 2020-08-18 PROCEDURE — 77067 MAMMO DIGITAL SCREENING BILAT WITH TOMOSYNTHESIS_CAD: ICD-10-PCS | Mod: 26,HCNC,, | Performed by: RADIOLOGY

## 2020-08-18 PROCEDURE — 99999 PR PBB SHADOW E&M-EST. PATIENT-LVL III: ICD-10-PCS | Mod: PBBFAC,HCNC,, | Performed by: ORTHOPAEDIC SURGERY

## 2020-08-18 PROCEDURE — 77063 BREAST TOMOSYNTHESIS BI: CPT | Mod: 26,HCNC,, | Performed by: RADIOLOGY

## 2020-08-18 PROCEDURE — 77063 MAMMO DIGITAL SCREENING BILAT WITH TOMOSYNTHESIS_CAD: ICD-10-PCS | Mod: 26,HCNC,, | Performed by: RADIOLOGY

## 2020-08-18 PROCEDURE — 99024 POSTOP FOLLOW-UP VISIT: CPT | Mod: HCNC,S$GLB,, | Performed by: ORTHOPAEDIC SURGERY

## 2020-08-19 ENCOUNTER — PATIENT OUTREACH (OUTPATIENT)
Dept: ADMINISTRATIVE | Facility: HOSPITAL | Age: 75
End: 2020-08-19

## 2020-08-27 ENCOUNTER — LAB VISIT (OUTPATIENT)
Dept: LAB | Facility: HOSPITAL | Age: 75
End: 2020-08-27
Attending: INTERNAL MEDICINE
Payer: MEDICARE

## 2020-08-27 DIAGNOSIS — Z13.6 SCREENING FOR CARDIOVASCULAR CONDITION: ICD-10-CM

## 2020-08-27 DIAGNOSIS — Z00.00 ANNUAL PHYSICAL EXAM: ICD-10-CM

## 2020-08-27 LAB
ALBUMIN SERPL BCP-MCNC: 3.9 G/DL (ref 3.5–5.2)
ALP SERPL-CCNC: 69 U/L (ref 55–135)
ALT SERPL W/O P-5'-P-CCNC: 21 U/L (ref 10–44)
AST SERPL-CCNC: 21 U/L (ref 10–40)
BILIRUB DIRECT SERPL-MCNC: 0.2 MG/DL (ref 0.1–0.3)
BILIRUB SERPL-MCNC: 0.3 MG/DL (ref 0.1–1)
CHOLEST SERPL-MCNC: 134 MG/DL (ref 120–199)
CHOLEST/HDLC SERPL: 2.4 {RATIO} (ref 2–5)
HDLC SERPL-MCNC: 55 MG/DL (ref 40–75)
HDLC SERPL: 41 % (ref 20–50)
LDLC SERPL CALC-MCNC: 70.6 MG/DL (ref 63–159)
NONHDLC SERPL-MCNC: 79 MG/DL
PROT SERPL-MCNC: 6.6 G/DL (ref 6–8.4)
TRIGL SERPL-MCNC: 42 MG/DL (ref 30–150)

## 2020-08-27 PROCEDURE — 80061 LIPID PANEL: CPT | Mod: HCNC

## 2020-08-27 PROCEDURE — 80076 HEPATIC FUNCTION PANEL: CPT | Mod: HCNC

## 2020-08-27 PROCEDURE — 36415 COLL VENOUS BLD VENIPUNCTURE: CPT | Mod: HCNC

## 2020-09-02 ENCOUNTER — OFFICE VISIT (OUTPATIENT)
Dept: INTERNAL MEDICINE | Facility: CLINIC | Age: 75
End: 2020-09-02
Payer: MEDICARE

## 2020-09-02 VITALS
HEART RATE: 59 BPM | OXYGEN SATURATION: 98 % | SYSTOLIC BLOOD PRESSURE: 140 MMHG | WEIGHT: 182.31 LBS | BODY MASS INDEX: 26.1 KG/M2 | DIASTOLIC BLOOD PRESSURE: 80 MMHG | HEIGHT: 70 IN

## 2020-09-02 DIAGNOSIS — B35.1 ONYCHOMYCOSIS: ICD-10-CM

## 2020-09-02 DIAGNOSIS — R03.0 ELEVATED BLOOD PRESSURE READING WITHOUT DIAGNOSIS OF HYPERTENSION: ICD-10-CM

## 2020-09-02 DIAGNOSIS — Z00.00 ANNUAL PHYSICAL EXAM: Primary | ICD-10-CM

## 2020-09-02 PROCEDURE — 99999 PR PBB SHADOW E&M-EST. PATIENT-LVL V: ICD-10-PCS | Mod: PBBFAC,HCNC,, | Performed by: INTERNAL MEDICINE

## 2020-09-02 PROCEDURE — 99397 PER PM REEVAL EST PAT 65+ YR: CPT | Mod: HCNC,S$GLB,, | Performed by: INTERNAL MEDICINE

## 2020-09-02 PROCEDURE — 99397 PR PREVENTIVE VISIT,EST,65 & OVER: ICD-10-PCS | Mod: HCNC,S$GLB,, | Performed by: INTERNAL MEDICINE

## 2020-09-02 PROCEDURE — 99999 PR PBB SHADOW E&M-EST. PATIENT-LVL V: CPT | Mod: PBBFAC,HCNC,, | Performed by: INTERNAL MEDICINE

## 2020-09-02 RX ORDER — TERBINAFINE HYDROCHLORIDE 250 MG/1
250 TABLET ORAL DAILY
Qty: 30 TABLET | Refills: 2 | Status: SHIPPED | OUTPATIENT
Start: 2020-09-02 | End: 2020-10-02

## 2020-09-02 NOTE — PROGRESS NOTES
Subjective:       Patient ID: Aurora Castle is a 74 y.o. female.    Chief Complaint: Annual Exam    HPI   Recent R TKR.    Back swimming, walking, stationary bike.    Onycho mycosis has resturned. Took lamisil years ago.    Purpura with sun exposure.        BMI 26.     Review of Systems   Constitutional: Negative for fever and unexpected weight change.   HENT: Negative for nasal congestion and postnasal drip.    Respiratory: Negative for chest tightness, shortness of breath and wheezing.    Cardiovascular: Negative for chest pain and leg swelling.   Gastrointestinal: Negative for abdominal pain, anal bleeding, constipation, diarrhea, nausea and vomiting.   Genitourinary: Negative for dysuria and urgency.   Integumentary:  Negative for rash.   Neurological: Negative for headaches.   Psychiatric/Behavioral: Negative for dysphoric mood and sleep disturbance. The patient is not nervous/anxious.          Objective:      Physical Exam  Constitutional:       General: She is not in acute distress.     Appearance: She is well-developed.   HENT:      Head: Normocephalic and atraumatic.      Right Ear: External ear normal.      Left Ear: External ear normal.      Nose: Nose normal.   Eyes:      General: No scleral icterus.        Right eye: No discharge.         Left eye: No discharge.      Conjunctiva/sclera: Conjunctivae normal.      Pupils: Pupils are equal, round, and reactive to light.   Neck:      Musculoskeletal: Normal range of motion and neck supple.      Thyroid: No thyromegaly.      Vascular: No JVD.   Cardiovascular:      Rate and Rhythm: Normal rate and regular rhythm.      Heart sounds: Normal heart sounds. No murmur. No gallop.    Pulmonary:      Effort: Pulmonary effort is normal. No respiratory distress.      Breath sounds: Normal breath sounds. No wheezing or rales.   Abdominal:      General: Bowel sounds are normal. There is no distension.      Palpations: Abdomen is soft. There is no mass.       Tenderness: There is no abdominal tenderness. There is no guarding or rebound.   Musculoskeletal: Normal range of motion.         General: No tenderness.   Lymphadenopathy:      Cervical: No cervical adenopathy.   Skin:     General: Skin is warm and dry.      Findings: No rash.      Comments: onycho mycotic changes r grt nail.   Neurological:      Mental Status: She is alert and oriented to person, place, and time.      Cranial Nerves: No cranial nerve deficit.      Coordination: Coordination normal.   Psychiatric:         Behavior: Behavior normal.         Thought Content: Thought content normal.         Judgment: Judgment normal.       166/92  Results for orders placed or performed in visit on 08/27/20   Lipid Panel   Result Value Ref Range    Cholesterol 134 120 - 199 mg/dL    Triglycerides 42 30 - 150 mg/dL    HDL 55 40 - 75 mg/dL    LDL Cholesterol 70.6 63.0 - 159.0 mg/dL    Hdl/Cholesterol Ratio 41.0 20.0 - 50.0 %    Total Cholesterol/HDL Ratio 2.4 2.0 - 5.0    Non-HDL Cholesterol 79 mg/dL   Hepatic function panel   Result Value Ref Range    Total Protein 6.6 6.0 - 8.4 g/dL    Albumin 3.9 3.5 - 5.2 g/dL    Total Bilirubin 0.3 0.1 - 1.0 mg/dL    Bilirubin, Direct 0.2 0.1 - 0.3 mg/dL    AST 21 10 - 40 U/L    ALT 21 10 - 44 U/L    Alkaline Phosphatase 69 55 - 135 U/L     Assessment:       1. Annual physical exam    2. Onychomycosis    3. Elevated blood pressure reading without diagnosis of hypertension        Plan:       Aurora was seen today for annual exam.    Diagnoses and all orders for this visit:    Annual physical exam    Onychomycosis  -     AST (SGOT); Future  -     ALT (SGPT); Future    Elevated blood pressure reading without diagnosis of hypertension    Other orders  -     terbinafine HCL (LAMISIL) 250 mg tablet; Take 1 tablet (250 mg total) by mouth once daily.       BP check, labs 6 weeks

## 2020-09-08 DIAGNOSIS — Z96.651 STATUS POST TOTAL RIGHT KNEE REPLACEMENT: Primary | ICD-10-CM

## 2020-09-10 NOTE — PROGRESS NOTES
Subjective:     HPI:   Aurora Castle is a 74 y.o. female who presents ten weeks out from right total knee replacement 2020    Overall she says the gabapentin is helping she is taking it 3 times a day.  She is working in the pool and walking.  She takes Tylenol and Celebrex in the morning     Objective:   Body mass index is 25.15 kg/m².  Exam:  Walking better and less limb was antalgic gait.  Incisions well-healed.   degree knee range of motion.  Preop was  degree range of motion 25 degree valgus alignment on  she was       Imaging:  None today      Assessment:       ICD-10-CM ICD-9-CM   1. Status post total right knee replacement 2020  Z96.651 V43.65      Postop neuropathic pain     Plan:       Overall she seems to be doing better neuropathic pain is improving on the gabapentin.    We will give her the [facility] home PT links    6 week follow-up for 3 month x-rays    No orders of the defined types were placed in this encounter.            Past Medical History:   Diagnosis Date    Arthritis     History of adenomatous polyp of colon     Joint pain     Pneumonia     during college     Tendonitis of ankle or foot     Trouble in sleeping     Urinary incontinence        Past Surgical History:   Procedure Laterality Date     SECTION          COLONOSCOPY N/A 2018    Procedure: COLONOSCOPY;  Surgeon: Jim Borges MD;  Location: [address];  Service: Endoscopy;  Laterality: N/A;    hemorrhiodectomy      tendon surgery L foot      TOTAL KNEE ARTHROPLASTY Right 2020    Procedure: ARTHROPLASTY, KNEE, TOTAL-DEPUY-SIGMA/DEPUY-TC3/MBT back up;  Surgeon: Zack Patton III, MD;  Location: [facility];  Service: Orthopedics;  Laterality: Right;       Family History   Problem Relation Age of Onset    Heart disease Mother 78        MI    Parkinsonism Father     No Known Problems Daughter     Melanoma Neg Hx     Psoriasis Neg Hx     Lupus Neg Hx      Eczema Neg Hx     Acne Neg Hx        Social History     Socioeconomic History    Marital status:      Spouse name: Not on file    Number of children: 1    Years of education: Not on file    Highest education level: Not on file   Occupational History    Occupation: jeweler   Social Needs    Financial resource strain: Not on file    Food insecurity     Worry: Not on file     Inability: Not on file    Transportation needs     Medical: Not on file     Non-medical: Not on file   Tobacco Use    Smoking status: Never Smoker    Smokeless tobacco: Never Used   Substance and Sexual Activity    Alcohol use: Yes     Alcohol/week: 7.0 standard drinks     Types: 7 Glasses of wine per week     Comment: 5 oz of wine nightly    Drug use: No    Sexual activity: Yes     Partners: Male   Lifestyle    Physical activity     Days per week: Not on file     Minutes per session: Not on file    Stress: Not on file   Relationships    Social connections     Talks on phone: Not on file     Gets together: Not on file     Attends Episcopalian service: Not on file     Active member of club or organization: Not on file     Attends meetings of clubs or organizations: Not on file     Relationship status: Not on file   Other Topics Concern    Are you pregnant or think you may be? No    Breast-feeding No   Social History Narrative    Biking, walking.

## 2020-09-28 ENCOUNTER — PATIENT OUTREACH (OUTPATIENT)
Dept: ADMINISTRATIVE | Facility: OTHER | Age: 75
End: 2020-09-28

## 2020-09-29 ENCOUNTER — OFFICE VISIT (OUTPATIENT)
Dept: ORTHOPEDICS | Facility: CLINIC | Age: 75
End: 2020-09-29
Payer: MEDICARE

## 2020-09-29 ENCOUNTER — HOSPITAL ENCOUNTER (OUTPATIENT)
Dept: RADIOLOGY | Facility: HOSPITAL | Age: 75
Discharge: HOME | End: 2020-09-29
Attending: ORTHOPAEDIC SURGERY
Payer: MEDICARE

## 2020-09-29 VITALS — BODY MASS INDEX: 26.1 KG/M2 | HEIGHT: 70 IN | WEIGHT: 182.31 LBS

## 2020-09-29 DIAGNOSIS — Z96.651 STATUS POST TOTAL RIGHT KNEE REPLACEMENT: ICD-10-CM

## 2020-09-29 DIAGNOSIS — Z96.651 STATUS POST TOTAL RIGHT KNEE REPLACEMENT: Primary | ICD-10-CM

## 2020-09-29 PROCEDURE — 99212 OFFICE O/P EST SF 10 MIN: CPT | Mod: HCNC,S$GLB,, | Performed by: ORTHOPAEDIC SURGERY

## 2020-09-29 PROCEDURE — 3008F BODY MASS INDEX DOCD: CPT | Mod: HCNC,CPTII,S$GLB, | Performed by: ORTHOPAEDIC SURGERY

## 2020-09-29 PROCEDURE — 1126F PR PAIN SEVERITY QUANTIFIED, NO PAIN PRESENT: ICD-10-PCS | Mod: HCNC,S$GLB,, | Performed by: ORTHOPAEDIC SURGERY

## 2020-09-29 PROCEDURE — 99999 PR PBB SHADOW E&M-EST. PATIENT-LVL III: ICD-10-PCS | Mod: PBBFAC,HCNC,, | Performed by: ORTHOPAEDIC SURGERY

## 2020-09-29 PROCEDURE — 3008F PR BODY MASS INDEX (BMI) DOCUMENTED: ICD-10-PCS | Mod: HCNC,CPTII,S$GLB, | Performed by: ORTHOPAEDIC SURGERY

## 2020-09-29 PROCEDURE — 1101F PT FALLS ASSESS-DOCD LE1/YR: CPT | Mod: HCNC,CPTII,S$GLB, | Performed by: ORTHOPAEDIC SURGERY

## 2020-09-29 PROCEDURE — 1101F PR PT FALLS ASSESS DOC 0-1 FALLS W/OUT INJ PAST YR: ICD-10-PCS | Mod: HCNC,CPTII,S$GLB, | Performed by: ORTHOPAEDIC SURGERY

## 2020-09-29 PROCEDURE — 73560 X-RAY EXAM OF KNEE 1 OR 2: CPT | Mod: TC,HCNC,RT

## 2020-09-29 PROCEDURE — 99212 PR OFFICE/OUTPT VISIT, EST, LEVL II, 10-19 MIN: ICD-10-PCS | Mod: HCNC,S$GLB,, | Performed by: ORTHOPAEDIC SURGERY

## 2020-09-29 PROCEDURE — 73560 X-RAY EXAM OF KNEE 1 OR 2: CPT | Mod: 26,HCNC,RT, | Performed by: RADIOLOGY

## 2020-09-29 PROCEDURE — 73560 XR KNEE 1 OR 2 VIEW RIGHT: ICD-10-PCS | Mod: 26,HCNC,RT, | Performed by: RADIOLOGY

## 2020-09-29 PROCEDURE — 99999 PR PBB SHADOW E&M-EST. PATIENT-LVL III: CPT | Mod: PBBFAC,HCNC,, | Performed by: ORTHOPAEDIC SURGERY

## 2020-09-29 PROCEDURE — 1159F PR MEDICATION LIST DOCUMENTED IN MEDICAL RECORD: ICD-10-PCS | Mod: HCNC,S$GLB,, | Performed by: ORTHOPAEDIC SURGERY

## 2020-09-29 PROCEDURE — 1126F AMNT PAIN NOTED NONE PRSNT: CPT | Mod: HCNC,S$GLB,, | Performed by: ORTHOPAEDIC SURGERY

## 2020-09-29 PROCEDURE — 1159F MED LIST DOCD IN RCRD: CPT | Mod: HCNC,S$GLB,, | Performed by: ORTHOPAEDIC SURGERY

## 2020-10-01 NOTE — PROGRESS NOTES
Subjective:     HPI:   Aurora Castle is a 75 y.o. female who presents 4 months out from right TKA    Date of surgery: 6/8/20    Medications:  Neurontin 300 milligrams 3 times a day, Celebrex, Tylenol    Assistive Devices: none    PT: finished    Overall she feels like her nerve pain is getting better.  She has been riding a stationary bike and feels like she is starting to turn the corner       Objective:   Body mass index is 26.16 kg/m².  Exam:    Gait: limp/antalgic none    Incision: healed    Stability:  Knee stable anterior-posterior varus and valgus stresses, no extensor lag    Extension: 8    Flexion: 115    Pre-op , 25deg valgus      Imaging:  Indication:  Exam status post right total knee arthroplasty  Exam Ordered: Radiographs of the right knee include a standing anteroposterior view, a lateral view in full flexion  Details of Examination: Todays exam show a well fixed, well positioned total knee arthroplasty with no evidence of wear, osteolysis, or loosening.  Impression:  Status post right total knee arthroplasty, implant in good position with no abnormality        Assessment:       ICD-10-CM ICD-9-CM   1. Status post total right knee replacement 6/8/2020  Z96.651 V43.65        Neuropathic pain improving     Plan:       Patient is doing very well with their total knee arthroplasty.  They will continue with their routine care of the knee replacement and see me back for their follow-up at the routine interval.  If there are problems in the interim they will see me back sooner.    She will try to titrate down on the Neurontin    8 month follow up for annual xrays now or sooner if she has any issues      No orders of the defined types were placed in this encounter.            Past Medical History:   Diagnosis Date    Arthritis     History of adenomatous polyp of colon     Joint pain     Pneumonia     during college     Tendonitis of ankle or foot     Trouble in sleeping     Urinary  incontinence        Past Surgical History:   Procedure Laterality Date     SECTION          COLONOSCOPY N/A 2018    Procedure: COLONOSCOPY;  Surgeon: Jim Borges MD;  Location: [address]);  Service: Endoscopy;  Laterality: N/A;    hemorrhiodectomy      tendon surgery L foot      TOTAL KNEE ARTHROPLASTY Right 2020    Procedure: ARTHROPLASTY, KNEE, TOTAL-DEPUY-SIGMA/DEPUY-TC3/MBT back up;  Surgeon: Zack Patton III, MD;  Location: [facility];  Service: Orthopedics;  Laterality: Right;       Family History   Problem Relation Age of Onset    Heart disease Mother 78        MI    Parkinsonism Father     No Known Problems Daughter     Melanoma Neg Hx     Psoriasis Neg Hx     Lupus Neg Hx     Eczema Neg Hx     Acne Neg Hx        Social History     Socioeconomic History    Marital status:      Spouse name: Not on file    Number of children: 1    Years of education: Not on file    Highest education level: Not on file   Occupational History    Occupation: jeweler   Social Needs    Financial resource strain: Not on file    Food insecurity     Worry: Not on file     Inability: Not on file    Transportation needs     Medical: Not on file     Non-medical: Not on file   Tobacco Use    Smoking status: Never Smoker    Smokeless tobacco: Never Used   Substance and Sexual Activity    Alcohol use: Yes     Alcohol/week: 7.0 standard drinks     Types: 7 Glasses of wine per week     Comment: 5 oz of wine nightly    Drug use: No    Sexual activity: Yes     Partners: Male   Lifestyle    Physical activity     Days per week: Not on file     Minutes per session: Not on file    Stress: Not on file   Relationships    Social connections     Talks on phone: Not on file     Gets together: Not on file     Attends Gnosticist service: Not on file     Active member of club or organization: Not on file     Attends meetings of clubs or organizations: Not on file     Relationship  status: Not on file   Other Topics Concern    Are you pregnant or think you may be? No    Breast-feeding No   Social History Narrative    Biking, walking.

## 2020-10-02 ENCOUNTER — PES CALL (OUTPATIENT)
Dept: ADMINISTRATIVE | Facility: CLINIC | Age: 75
End: 2020-10-02

## 2020-10-14 ENCOUNTER — OFFICE VISIT (OUTPATIENT)
Dept: INTERNAL MEDICINE | Facility: CLINIC | Age: 75
End: 2020-10-14
Payer: MEDICARE

## 2020-10-14 ENCOUNTER — CLINICAL SUPPORT (OUTPATIENT)
Dept: INTERNAL MEDICINE | Facility: CLINIC | Age: 75
End: 2020-10-14
Payer: MEDICARE

## 2020-10-14 ENCOUNTER — LAB VISIT (OUTPATIENT)
Dept: LAB | Facility: HOSPITAL | Age: 75
End: 2020-10-14
Attending: INTERNAL MEDICINE
Payer: MEDICARE

## 2020-10-14 ENCOUNTER — TELEPHONE (OUTPATIENT)
Dept: INTERNAL MEDICINE | Facility: CLINIC | Age: 75
End: 2020-10-14

## 2020-10-14 VITALS
SYSTOLIC BLOOD PRESSURE: 132 MMHG | BODY MASS INDEX: 25.27 KG/M2 | HEART RATE: 53 BPM | DIASTOLIC BLOOD PRESSURE: 82 MMHG | DIASTOLIC BLOOD PRESSURE: 72 MMHG | WEIGHT: 176.13 LBS | OXYGEN SATURATION: 92 % | HEART RATE: 54 BPM | OXYGEN SATURATION: 98 % | SYSTOLIC BLOOD PRESSURE: 124 MMHG

## 2020-10-14 DIAGNOSIS — R03.0 ELEVATED BLOOD PRESSURE READING WITHOUT DIAGNOSIS OF HYPERTENSION: Primary | ICD-10-CM

## 2020-10-14 DIAGNOSIS — B35.1 ONYCHOMYCOSIS: ICD-10-CM

## 2020-10-14 LAB
ALT SERPL W/O P-5'-P-CCNC: 18 U/L (ref 10–44)
AST SERPL-CCNC: 19 U/L (ref 10–40)

## 2020-10-14 PROCEDURE — 84460 ALANINE AMINO (ALT) (SGPT): CPT | Mod: HCNC

## 2020-10-14 PROCEDURE — 99999 PR PBB SHADOW E&M-EST. PATIENT-LVL IV: ICD-10-PCS | Mod: PBBFAC,HCNC,, | Performed by: PHYSICIAN ASSISTANT

## 2020-10-14 PROCEDURE — 3008F BODY MASS INDEX DOCD: CPT | Mod: HCNC,CPTII,S$GLB, | Performed by: PHYSICIAN ASSISTANT

## 2020-10-14 PROCEDURE — 99999 PR PBB SHADOW E&M-EST. PATIENT-LVL IV: CPT | Mod: PBBFAC,HCNC,, | Performed by: PHYSICIAN ASSISTANT

## 2020-10-14 PROCEDURE — 99999 PR PBB SHADOW E&M-EST. PATIENT-LVL II: ICD-10-PCS | Mod: PBBFAC,HCNC,,

## 2020-10-14 PROCEDURE — 1159F MED LIST DOCD IN RCRD: CPT | Mod: HCNC,S$GLB,, | Performed by: PHYSICIAN ASSISTANT

## 2020-10-14 PROCEDURE — 99213 PR OFFICE/OUTPT VISIT, EST, LEVL III, 20-29 MIN: ICD-10-PCS | Mod: HCNC,S$GLB,, | Performed by: PHYSICIAN ASSISTANT

## 2020-10-14 PROCEDURE — 36415 COLL VENOUS BLD VENIPUNCTURE: CPT | Mod: HCNC

## 2020-10-14 PROCEDURE — 1101F PR PT FALLS ASSESS DOC 0-1 FALLS W/OUT INJ PAST YR: ICD-10-PCS | Mod: HCNC,CPTII,S$GLB, | Performed by: PHYSICIAN ASSISTANT

## 2020-10-14 PROCEDURE — 84450 TRANSFERASE (AST) (SGOT): CPT | Mod: HCNC

## 2020-10-14 PROCEDURE — 99999 PR PBB SHADOW E&M-EST. PATIENT-LVL II: CPT | Mod: PBBFAC,HCNC,,

## 2020-10-14 PROCEDURE — 1126F PR PAIN SEVERITY QUANTIFIED, NO PAIN PRESENT: ICD-10-PCS | Mod: HCNC,S$GLB,, | Performed by: PHYSICIAN ASSISTANT

## 2020-10-14 PROCEDURE — 1126F AMNT PAIN NOTED NONE PRSNT: CPT | Mod: HCNC,S$GLB,, | Performed by: PHYSICIAN ASSISTANT

## 2020-10-14 PROCEDURE — 1101F PT FALLS ASSESS-DOCD LE1/YR: CPT | Mod: HCNC,CPTII,S$GLB, | Performed by: PHYSICIAN ASSISTANT

## 2020-10-14 PROCEDURE — 99213 OFFICE O/P EST LOW 20 MIN: CPT | Mod: HCNC,S$GLB,, | Performed by: PHYSICIAN ASSISTANT

## 2020-10-14 PROCEDURE — 1159F PR MEDICATION LIST DOCUMENTED IN MEDICAL RECORD: ICD-10-PCS | Mod: HCNC,S$GLB,, | Performed by: PHYSICIAN ASSISTANT

## 2020-10-14 PROCEDURE — 3008F PR BODY MASS INDEX (BMI) DOCUMENTED: ICD-10-PCS | Mod: HCNC,CPTII,S$GLB, | Performed by: PHYSICIAN ASSISTANT

## 2020-10-14 NOTE — TELEPHONE ENCOUNTER
----- Message from Ghazal Sidhu sent at 10/14/2020  2:14 PM CDT -----  Regarding: Surgical clearance  Contact: Patient  [phone]  Checking on status of Surgical clearance.  Please call

## 2020-10-14 NOTE — PROGRESS NOTES
Subjective:       Patient ID: Aurora Castle is a 75 y.o. female.    Chief Complaint: Follow-up    HPI     Established pt of Isabelle Vincent MD (new to me)    Pt states she is here for an annual. On chart review had annual last month with Dr. Helton. Her BP was noted to be elevated so I suppose she is back for BP check.     She is without any acute complaints today.     She has lost about 8lbs over the past month. BP is better and now at goal today in clinic 124/72.    She reports she is having right cataract surgery in a few weeks with an outside opthalmologist.     Past Medical History:   Diagnosis Date    Arthritis     History of adenomatous polyp of colon     Joint pain     Pneumonia     during college     Tendonitis of ankle or foot     Trouble in sleeping     Urinary incontinence      Social History     Tobacco Use    Smoking status: Never Smoker    Smokeless tobacco: Never Used   Substance Use Topics    Alcohol use: Yes     Alcohol/week: 7.0 standard drinks     Types: 7 Glasses of wine per week     Comment: 5 oz of wine nightly    Drug use: No     Review of patient's allergies indicates:   Allergen Reactions    Erythromycin Itching and Swelling     Eye swelling   Local eye ointment           Review of Systems   Constitutional: Negative for chills, fever and unexpected weight change.   Respiratory: Negative for cough and shortness of breath.    Cardiovascular: Negative for chest pain and leg swelling.   Gastrointestinal: Negative for abdominal pain, nausea and vomiting.   Integumentary:  Negative for rash.   Neurological: Negative for weakness, light-headedness and headaches.         Objective: /72 Comment: manual recheck  Pulse (!) 53   Wt 79.9 kg (176 lb 2.4 oz)   LMP  (LMP Unknown)   SpO2 (!) 92%   BMI 25.27 kg/m²         Physical Exam  Vitals signs reviewed.   Constitutional:       General: She is not in acute distress.     Appearance: Normal appearance. She is well-developed.    HENT:      Head: Normocephalic and atraumatic.   Cardiovascular:      Rate and Rhythm: Normal rate and regular rhythm.      Heart sounds: No murmur. No friction rub.   Pulmonary:      Effort: Pulmonary effort is normal.      Breath sounds: Normal breath sounds. No wheezing or rales.   Abdominal:      General: Bowel sounds are normal.      Palpations: Abdomen is soft.      Tenderness: There is no abdominal tenderness.   Musculoskeletal:      Right lower leg: No edema.      Left lower leg: No edema.   Skin:     General: Skin is warm and dry.      Findings: No rash.   Neurological:      Mental Status: She is alert and oriented to person, place, and time.         Assessment:       1. Elevated blood pressure reading without diagnosis of hypertension        Plan:         Aurora was seen today for follow-up.    Diagnoses and all orders for this visit:    Elevated blood pressure reading without diagnosis of hypertension  BP at goal today  BP Readings from Last 3 Encounters:   10/14/20 124/72   10/14/20 132/82   09/02/20 (!) 140/80   Continue lifestyle mods        Lyric Liu PA-C

## 2020-10-14 NOTE — PROGRESS NOTES
Pt came in for BP check. /82 P 54 O2- 98%  Pt also needs paperwork signed by PCP for procedure coming up, placed on desk.   PCP notified

## 2020-10-14 NOTE — TELEPHONE ENCOUNTER
Pt came in for BP check. /82 P 54 O2- 98%  Pt also needs paperwork signed by PCP for procedure coming up, placed on desk.

## 2020-10-22 DIAGNOSIS — Z96.651 STATUS POST TOTAL RIGHT KNEE REPLACEMENT: ICD-10-CM

## 2020-10-22 RX ORDER — CELECOXIB 200 MG/1
200 CAPSULE ORAL DAILY
Qty: 30 CAPSULE | Refills: 0 | Status: SHIPPED | OUTPATIENT
Start: 2020-10-22 | End: 2020-11-27 | Stop reason: SDUPTHER

## 2020-10-22 RX ORDER — GABAPENTIN 300 MG/1
300 CAPSULE ORAL 3 TIMES DAILY
Qty: 90 CAPSULE | Refills: 2 | Status: SHIPPED | OUTPATIENT
Start: 2020-10-22 | End: 2020-12-31

## 2020-11-02 ENCOUNTER — PATIENT MESSAGE (OUTPATIENT)
Dept: INTERNAL MEDICINE | Facility: CLINIC | Age: 75
End: 2020-11-02

## 2020-11-03 ENCOUNTER — PATIENT MESSAGE (OUTPATIENT)
Dept: INTERNAL MEDICINE | Facility: CLINIC | Age: 75
End: 2020-11-03

## 2020-11-27 DIAGNOSIS — Z96.651 STATUS POST TOTAL RIGHT KNEE REPLACEMENT: ICD-10-CM

## 2020-11-27 RX ORDER — CELECOXIB 200 MG/1
200 CAPSULE ORAL DAILY
Qty: 30 CAPSULE | Refills: 2 | Status: SHIPPED | OUTPATIENT
Start: 2020-11-27 | End: 2021-04-13

## 2020-11-27 NOTE — TELEPHONE ENCOUNTER
----- Message from Ghazal Sidhu sent at 11/27/2020 11:54 AM CST -----  Regarding: refill reqeust  Contact: Patient [phone]  Requesting an RX refill or new RX.  Is this a refill or new RX: refill  RX name and strength:celecoxib (CELEBREX) 200 MG capsule  Is this a 30 day or 90 day RX: 30  Pharmacy name and phone #[facility] - [address] [phone] (Phone) [phone] (Fax)

## 2020-12-04 ENCOUNTER — TELEPHONE (OUTPATIENT)
Dept: INTERNAL MEDICINE | Facility: CLINIC | Age: 75
End: 2020-12-04

## 2020-12-11 ENCOUNTER — PATIENT MESSAGE (OUTPATIENT)
Dept: ORTHOPEDICS | Facility: CLINIC | Age: 75
End: 2020-12-11

## 2020-12-12 DIAGNOSIS — Z96.651 STATUS POST TOTAL RIGHT KNEE REPLACEMENT: Primary | ICD-10-CM

## 2020-12-15 ENCOUNTER — OFFICE VISIT (OUTPATIENT)
Dept: ORTHOPEDICS | Facility: CLINIC | Age: 75
End: 2020-12-15
Payer: MEDICARE

## 2020-12-15 ENCOUNTER — HOSPITAL ENCOUNTER (OUTPATIENT)
Dept: RADIOLOGY | Facility: HOSPITAL | Age: 75
Discharge: HOME | End: 2020-12-15
Attending: ORTHOPAEDIC SURGERY
Payer: MEDICARE

## 2020-12-15 VITALS — BODY MASS INDEX: 25.21 KG/M2 | WEIGHT: 176.13 LBS | HEIGHT: 70 IN

## 2020-12-15 DIAGNOSIS — Z96.651 STATUS POST TOTAL RIGHT KNEE REPLACEMENT: Primary | ICD-10-CM

## 2020-12-15 DIAGNOSIS — Z96.651 STATUS POST TOTAL RIGHT KNEE REPLACEMENT: ICD-10-CM

## 2020-12-15 PROCEDURE — 1159F MED LIST DOCD IN RCRD: CPT | Mod: HCNC,S$GLB,, | Performed by: ORTHOPAEDIC SURGERY

## 2020-12-15 PROCEDURE — 3008F BODY MASS INDEX DOCD: CPT | Mod: HCNC,CPTII,S$GLB, | Performed by: ORTHOPAEDIC SURGERY

## 2020-12-15 PROCEDURE — 1159F PR MEDICATION LIST DOCUMENTED IN MEDICAL RECORD: ICD-10-PCS | Mod: HCNC,S$GLB,, | Performed by: ORTHOPAEDIC SURGERY

## 2020-12-15 PROCEDURE — 73562 X-RAY EXAM OF KNEE 3: CPT | Mod: 26,HCNC,RT, | Performed by: RADIOLOGY

## 2020-12-15 PROCEDURE — 1101F PR PT FALLS ASSESS DOC 0-1 FALLS W/OUT INJ PAST YR: ICD-10-PCS | Mod: HCNC,CPTII,S$GLB, | Performed by: ORTHOPAEDIC SURGERY

## 2020-12-15 PROCEDURE — 73562 X-RAY EXAM OF KNEE 3: CPT | Mod: TC,HCNC,RT

## 2020-12-15 PROCEDURE — 73562 XR KNEE 3 VIEW RIGHT: ICD-10-PCS | Mod: 26,HCNC,RT, | Performed by: RADIOLOGY

## 2020-12-15 PROCEDURE — 3288F FALL RISK ASSESSMENT DOCD: CPT | Mod: HCNC,CPTII,S$GLB, | Performed by: ORTHOPAEDIC SURGERY

## 2020-12-15 PROCEDURE — 3008F PR BODY MASS INDEX (BMI) DOCUMENTED: ICD-10-PCS | Mod: HCNC,CPTII,S$GLB, | Performed by: ORTHOPAEDIC SURGERY

## 2020-12-15 PROCEDURE — 99213 OFFICE O/P EST LOW 20 MIN: CPT | Mod: HCNC,S$GLB,, | Performed by: ORTHOPAEDIC SURGERY

## 2020-12-15 PROCEDURE — 1125F PR PAIN SEVERITY QUANTIFIED, PAIN PRESENT: ICD-10-PCS | Mod: HCNC,S$GLB,, | Performed by: ORTHOPAEDIC SURGERY

## 2020-12-15 PROCEDURE — 99999 PR PBB SHADOW E&M-EST. PATIENT-LVL III: ICD-10-PCS | Mod: PBBFAC,HCNC,, | Performed by: ORTHOPAEDIC SURGERY

## 2020-12-15 PROCEDURE — 1101F PT FALLS ASSESS-DOCD LE1/YR: CPT | Mod: HCNC,CPTII,S$GLB, | Performed by: ORTHOPAEDIC SURGERY

## 2020-12-15 PROCEDURE — 3288F PR FALLS RISK ASSESSMENT DOCUMENTED: ICD-10-PCS | Mod: HCNC,CPTII,S$GLB, | Performed by: ORTHOPAEDIC SURGERY

## 2020-12-15 PROCEDURE — 99999 PR PBB SHADOW E&M-EST. PATIENT-LVL III: CPT | Mod: PBBFAC,HCNC,, | Performed by: ORTHOPAEDIC SURGERY

## 2020-12-15 PROCEDURE — 99213 PR OFFICE/OUTPT VISIT, EST, LEVL III, 20-29 MIN: ICD-10-PCS | Mod: HCNC,S$GLB,, | Performed by: ORTHOPAEDIC SURGERY

## 2020-12-15 PROCEDURE — 1125F AMNT PAIN NOTED PAIN PRSNT: CPT | Mod: HCNC,S$GLB,, | Performed by: ORTHOPAEDIC SURGERY

## 2020-12-15 RX ORDER — TERBINAFINE HYDROCHLORIDE 250 MG/1
TABLET ORAL
COMMUNITY
Start: 2020-12-04 | End: 2021-04-13

## 2020-12-15 RX ORDER — TERBINAFINE HYDROCHLORIDE 250 MG/1
TABLET ORAL
OUTPATIENT
Start: 2020-12-15

## 2020-12-15 NOTE — PROGRESS NOTES
Subjective:     HPI:   Aurora aCstle is a 75 y.o. female who presents 6 months out from right total knee replacement June 8, 2020    She had a lot of neuropathic pain postoperatively but more recently she was doing very well.    Unfortunately she missed a step coming off a step ladder decorating for [place] she fell onto some carpet and some chairs onto her right side but her left knee hit the medial aspect of her right knee she had a large contusion she has noticed some swelling and tingling in that area want to get it checked out.     It is improving with time Celebrex and gabapentin     Objective:   Body mass index is 25.27 kg/m².  Exam:  No limp nonantalgic gait walking well incisions well-healed a resolving contusion medial knee minimal tenderness knee stable anterior-posterior varus and valgus stresses no extensor lag, her flexion contracture is improving      Imaging:  Indication:  Exam status post right total knee arthroplasty  Exam Ordered: Radiographs of the right knee include a standing anteroposterior view, a lateral view in full flexion, and a sunrise view  Details of Examination: Todays exam show a well fixed, well positioned total knee arthroplasty with no evidence of wear, osteolysis, or loosening.  Impression:  Status post right total knee arthroplasty, implant in good position with no abnormality        Assessment:       ICD-10-CM ICD-9-CM   1. Status post total right knee replacement 6/8/2020  Z96.651 V43.65      Right medial knee contusion     Plan:       We discussed contusion management ice anti-inflammatory medications.    His can take weeks longer than she wants but will slowly improve I do not see any abnormalities with the joint itself    Standard 1 year follow-up in 6 months    No orders of the defined types were placed in this encounter.            Past Medical History:   Diagnosis Date    Arthritis     History of adenomatous polyp of colon     Joint pain     Pneumonia      during      Tendonitis of ankle or foot     Trouble in sleeping     Urinary incontinence        Past Surgical History:   Procedure Laterality Date     SECTION          COLONOSCOPY N/A 2018    Procedure: COLONOSCOPY;  Surgeon: Jim Borges MD;  Location: [address];  Service: Endoscopy;  Laterality: N/A;    hemorrhiodectomy      tendon surgery L foot      TOTAL KNEE ARTHROPLASTY Right 2020    Procedure: ARTHROPLASTY, KNEE, TOTAL-DEPUY-SIGMA/DEPUY-TC3/MBT back up;  Surgeon: Zack Patton III, MD;  Location: [facility];  Service: Orthopedics;  Laterality: Right;       Family History   Problem Relation Age of Onset    Heart disease Mother 78        MI    Parkinsonism Father     No Known Problems Daughter     Melanoma Neg Hx     Psoriasis Neg Hx     Lupus Neg Hx     Eczema Neg Hx     Acne Neg Hx        Social History     Socioeconomic History    Marital status:      Spouse name: Not on file    Number of children: 1    Years of education: Not on file    Highest education level: Not on file   Occupational History    Occupation: jeweler   Social Needs    Financial resource strain: Not on file    Food insecurity     Worry: Not on file     Inability: Not on file    Transportation needs     Medical: Not on file     Non-medical: Not on file   Tobacco Use    Smoking status: Never Smoker    Smokeless tobacco: Never Used   Substance and Sexual Activity    Alcohol use: Yes     Alcohol/week: 7.0 standard drinks     Types: 7 Glasses of wine per week     Comment: 5 oz of wine nightly    Drug use: No    Sexual activity: Yes     Partners: Male   Lifestyle    Physical activity     Days per week: Not on file     Minutes per session: Not on file    Stress: Not on file   Relationships    Social connections     Talks on phone: Not on file     Gets together: Not on file     Attends Orthodoxy service: Not on file     Active member of club or organization: Not on  file     Attends meetings of clubs or organizations: Not on file     Relationship status: Not on file   Other Topics Concern    Are you pregnant or think you may be? No    Breast-feeding No   Social History Narrative    Biking, walking.

## 2020-12-15 NOTE — TELEPHONE ENCOUNTER
----- Message from Lexi Brewer sent at 12/15/2020  1:55 PM CST -----  Contact: DWIGHT MCMAHAN [[id number]] @ [phone]  Requesting an RX refill or new RX.  Is this a refill or new RX: Refill  RX name and strength: Terbinafine HCL (LAMISIL) 250 mg tablet  Is this a 30 day or 90 day RX: 30  Pharmacy name and phone # (copy/paste from chart):  [facility] [phone]   Comments:

## 2020-12-16 ENCOUNTER — TELEPHONE (OUTPATIENT)
Dept: INTERNAL MEDICINE | Facility: CLINIC | Age: 75
End: 2020-12-16

## 2020-12-16 NOTE — TELEPHONE ENCOUNTER
----- Message from Mary Jane Jules sent at 12/16/2020  2:42 PM CST -----  Contact: [phone] @ Patient  Requesting an RX refill or new RX.  Is this a refill or new RX:   RX name and strength: terbinafine HCL (LAMISIL) 250 mg tablet  Is this a 30 day or 90 day RX:   Pharmacy name and phone # [facility] ([facility] - [address] - [address]  Comments:   Patient would like a call once Rx called in

## 2020-12-18 ENCOUNTER — TELEPHONE (OUTPATIENT)
Dept: INTERNAL MEDICINE | Facility: CLINIC | Age: 75
End: 2020-12-18

## 2020-12-18 NOTE — TELEPHONE ENCOUNTER
Spoke with pharmacy and left pt a message that her rx is ready for .  Pharmacy stated that pt never picked up medicine on 12/5/2020 so it was put back. Pt is now able to  medicine today.

## 2020-12-18 NOTE — TELEPHONE ENCOUNTER
----- Message from Leix Brewer sent at 12/18/2020 12:05 PM CST -----  Contact: Kimberly Amaya @ [phone]  Pharmacy is calling to clarify an RX.  RX name:  Terbinafine HCL (LAMISIL) 250 mg tablet  What do they need to clarify: Very if you are trying to send over an approval for this.     Comments:  If so Can you call in a Verbal for it  instead? The one received has no doctor signature or directions and Qty.

## 2020-12-31 ENCOUNTER — HOSPITAL ENCOUNTER (EMERGENCY)
Facility: HOSPITAL | Age: 75
Discharge: HOME | End: 2020-12-31
Attending: EMERGENCY MEDICINE
Payer: MEDICARE

## 2020-12-31 VITALS
HEART RATE: 60 BPM | SYSTOLIC BLOOD PRESSURE: 178 MMHG | DIASTOLIC BLOOD PRESSURE: 80 MMHG | OXYGEN SATURATION: 96 % | RESPIRATION RATE: 20 BRPM | TEMPERATURE: 98 F

## 2020-12-31 DIAGNOSIS — S63.91XA HAND SPRAIN, RIGHT, INITIAL ENCOUNTER: Primary | ICD-10-CM

## 2020-12-31 PROCEDURE — 99283 PR EMERGENCY DEPT VISIT,LEVEL III: ICD-10-PCS | Mod: HCNC,,, | Performed by: EMERGENCY MEDICINE

## 2020-12-31 PROCEDURE — 99283 EMERGENCY DEPT VISIT LOW MDM: CPT | Mod: 25,HCNC

## 2020-12-31 PROCEDURE — 99283 EMERGENCY DEPT VISIT LOW MDM: CPT | Mod: HCNC,,, | Performed by: EMERGENCY MEDICINE

## 2020-12-31 PROCEDURE — 25000003 PHARM REV CODE 250: Mod: HCNC | Performed by: EMERGENCY MEDICINE

## 2020-12-31 RX ORDER — IBUPROFEN 600 MG/1
600 TABLET ORAL
Status: COMPLETED | OUTPATIENT
Start: 2020-12-31 | End: 2020-12-31

## 2020-12-31 RX ORDER — NAPROXEN 500 MG/1
500 TABLET ORAL 2 TIMES DAILY PRN
Qty: 20 TABLET | Refills: 0 | Status: SHIPPED | OUTPATIENT
Start: 2020-12-31 | End: 2021-01-10

## 2020-12-31 RX ADMIN — IBUPROFEN 600 MG: 600 TABLET, FILM COATED ORAL at 09:12

## 2021-01-05 ENCOUNTER — OFFICE VISIT (OUTPATIENT)
Dept: ORTHOPEDICS | Facility: CLINIC | Age: 76
End: 2021-01-05
Payer: MEDICARE

## 2021-01-05 VITALS
BODY MASS INDEX: 25.21 KG/M2 | HEART RATE: 62 BPM | SYSTOLIC BLOOD PRESSURE: 123 MMHG | HEIGHT: 70 IN | DIASTOLIC BLOOD PRESSURE: 79 MMHG | WEIGHT: 176.13 LBS

## 2021-01-05 DIAGNOSIS — M65.321 TRIGGER INDEX FINGER OF RIGHT HAND: Primary | ICD-10-CM

## 2021-01-05 PROCEDURE — 99203 OFFICE O/P NEW LOW 30 MIN: CPT | Mod: HCNC,S$GLB,, | Performed by: PHYSICIAN ASSISTANT

## 2021-01-05 PROCEDURE — 99999 PR PBB SHADOW E&M-EST. PATIENT-LVL III: CPT | Mod: PBBFAC,HCNC,, | Performed by: PHYSICIAN ASSISTANT

## 2021-01-05 PROCEDURE — 1126F PR PAIN SEVERITY QUANTIFIED, NO PAIN PRESENT: ICD-10-PCS | Mod: HCNC,S$GLB,, | Performed by: PHYSICIAN ASSISTANT

## 2021-01-05 PROCEDURE — 1159F MED LIST DOCD IN RCRD: CPT | Mod: HCNC,S$GLB,, | Performed by: PHYSICIAN ASSISTANT

## 2021-01-05 PROCEDURE — 3288F FALL RISK ASSESSMENT DOCD: CPT | Mod: HCNC,CPTII,S$GLB, | Performed by: PHYSICIAN ASSISTANT

## 2021-01-05 PROCEDURE — 1101F PT FALLS ASSESS-DOCD LE1/YR: CPT | Mod: HCNC,CPTII,S$GLB, | Performed by: PHYSICIAN ASSISTANT

## 2021-01-05 PROCEDURE — 3008F PR BODY MASS INDEX (BMI) DOCUMENTED: ICD-10-PCS | Mod: HCNC,CPTII,S$GLB, | Performed by: PHYSICIAN ASSISTANT

## 2021-01-05 PROCEDURE — 3288F PR FALLS RISK ASSESSMENT DOCUMENTED: ICD-10-PCS | Mod: HCNC,CPTII,S$GLB, | Performed by: PHYSICIAN ASSISTANT

## 2021-01-05 PROCEDURE — 1101F PR PT FALLS ASSESS DOC 0-1 FALLS W/OUT INJ PAST YR: ICD-10-PCS | Mod: HCNC,CPTII,S$GLB, | Performed by: PHYSICIAN ASSISTANT

## 2021-01-05 PROCEDURE — 1159F PR MEDICATION LIST DOCUMENTED IN MEDICAL RECORD: ICD-10-PCS | Mod: HCNC,S$GLB,, | Performed by: PHYSICIAN ASSISTANT

## 2021-01-05 PROCEDURE — 3008F BODY MASS INDEX DOCD: CPT | Mod: HCNC,CPTII,S$GLB, | Performed by: PHYSICIAN ASSISTANT

## 2021-01-05 PROCEDURE — 1126F AMNT PAIN NOTED NONE PRSNT: CPT | Mod: HCNC,S$GLB,, | Performed by: PHYSICIAN ASSISTANT

## 2021-01-05 PROCEDURE — 99999 PR PBB SHADOW E&M-EST. PATIENT-LVL III: ICD-10-PCS | Mod: PBBFAC,HCNC,, | Performed by: PHYSICIAN ASSISTANT

## 2021-01-05 PROCEDURE — 99203 PR OFFICE/OUTPT VISIT, NEW, LEVL III, 30-44 MIN: ICD-10-PCS | Mod: HCNC,S$GLB,, | Performed by: PHYSICIAN ASSISTANT

## 2021-01-05 RX ORDER — DUREZOL 0.5 MG/ML
EMULSION OPHTHALMIC
COMMUNITY
Start: 2020-10-01 | End: 2021-04-13

## 2021-01-06 ENCOUNTER — IMMUNIZATION (OUTPATIENT)
Dept: FAMILY MEDICINE | Facility: CLINIC | Age: 76
End: 2021-01-06
Payer: MEDICARE

## 2021-01-06 DIAGNOSIS — Z23 NEED FOR VACCINATION: ICD-10-CM

## 2021-01-06 PROCEDURE — 91300 COVID-19, MRNA, LNP-S, PF, 30 MCG/0.3 ML DOSE VACCINE: CPT | Mod: PBBFAC | Performed by: FAMILY MEDICINE

## 2021-01-27 ENCOUNTER — IMMUNIZATION (OUTPATIENT)
Dept: FAMILY MEDICINE | Facility: CLINIC | Age: 76
End: 2021-01-27
Payer: MEDICARE

## 2021-01-27 DIAGNOSIS — Z23 NEED FOR VACCINATION: Primary | ICD-10-CM

## 2021-01-27 PROCEDURE — 0002A COVID-19, MRNA, LNP-S, PF, 30 MCG/0.3 ML DOSE VACCINE: CPT | Mod: PBBFAC | Performed by: FAMILY MEDICINE

## 2021-01-27 PROCEDURE — 91300 COVID-19, MRNA, LNP-S, PF, 30 MCG/0.3 ML DOSE VACCINE: CPT | Mod: PBBFAC | Performed by: FAMILY MEDICINE

## 2021-02-11 ENCOUNTER — PATIENT MESSAGE (OUTPATIENT)
Dept: INTERNAL MEDICINE | Facility: CLINIC | Age: 76
End: 2021-02-11

## 2021-02-11 DIAGNOSIS — N81.89 PELVIC RELAXATION: Primary | ICD-10-CM

## 2021-03-15 ENCOUNTER — PES CALL (OUTPATIENT)
Dept: ADMINISTRATIVE | Facility: CLINIC | Age: 76
End: 2021-03-15

## 2021-03-18 ENCOUNTER — PATIENT MESSAGE (OUTPATIENT)
Dept: RESEARCH | Facility: HOSPITAL | Age: 76
End: 2021-03-18

## 2021-03-26 ENCOUNTER — PATIENT MESSAGE (OUTPATIENT)
Dept: RESEARCH | Facility: HOSPITAL | Age: 76
End: 2021-03-26

## 2021-04-13 ENCOUNTER — OFFICE VISIT (OUTPATIENT)
Dept: UROGYNECOLOGY | Facility: CLINIC | Age: 76
End: 2021-04-13
Payer: MEDICARE

## 2021-04-13 VITALS — BODY MASS INDEX: 25.34 KG/M2 | HEIGHT: 70 IN | WEIGHT: 177 LBS

## 2021-04-13 DIAGNOSIS — N81.6 RECTOCELE, FEMALE: ICD-10-CM

## 2021-04-13 DIAGNOSIS — N39.46 URINARY INCONTINENCE, MIXED: Primary | ICD-10-CM

## 2021-04-13 DIAGNOSIS — N81.4 UTEROVAGINAL PROLAPSE: ICD-10-CM

## 2021-04-13 DIAGNOSIS — N81.11 CYSTOCELE, MIDLINE: ICD-10-CM

## 2021-04-13 DIAGNOSIS — N81.89 PELVIC RELAXATION: ICD-10-CM

## 2021-04-13 PROCEDURE — 3008F PR BODY MASS INDEX (BMI) DOCUMENTED: ICD-10-PCS | Mod: CPTII,S$GLB,, | Performed by: OBSTETRICS & GYNECOLOGY

## 2021-04-13 PROCEDURE — 1159F MED LIST DOCD IN RCRD: CPT | Mod: S$GLB,,, | Performed by: OBSTETRICS & GYNECOLOGY

## 2021-04-13 PROCEDURE — 1159F PR MEDICATION LIST DOCUMENTED IN MEDICAL RECORD: ICD-10-PCS | Mod: S$GLB,,, | Performed by: OBSTETRICS & GYNECOLOGY

## 2021-04-13 PROCEDURE — 3288F FALL RISK ASSESSMENT DOCD: CPT | Mod: CPTII,S$GLB,, | Performed by: OBSTETRICS & GYNECOLOGY

## 2021-04-13 PROCEDURE — 57160 PR FIT/INSERT INTRAVAG SUPPORT DEVICE: ICD-10-PCS | Mod: S$GLB,,, | Performed by: OBSTETRICS & GYNECOLOGY

## 2021-04-13 PROCEDURE — 57160 INSERT PESSARY/OTHER DEVICE: CPT | Mod: S$GLB,,, | Performed by: OBSTETRICS & GYNECOLOGY

## 2021-04-13 PROCEDURE — 1126F AMNT PAIN NOTED NONE PRSNT: CPT | Mod: S$GLB,,, | Performed by: OBSTETRICS & GYNECOLOGY

## 2021-04-13 PROCEDURE — 1101F PR PT FALLS ASSESS DOC 0-1 FALLS W/OUT INJ PAST YR: ICD-10-PCS | Mod: CPTII,S$GLB,, | Performed by: OBSTETRICS & GYNECOLOGY

## 2021-04-13 PROCEDURE — 87086 URINE CULTURE/COLONY COUNT: CPT | Performed by: OBSTETRICS & GYNECOLOGY

## 2021-04-13 PROCEDURE — 1126F PR PAIN SEVERITY QUANTIFIED, NO PAIN PRESENT: ICD-10-PCS | Mod: S$GLB,,, | Performed by: OBSTETRICS & GYNECOLOGY

## 2021-04-13 PROCEDURE — 99999 PR PBB SHADOW E&M-EST. PATIENT-LVL IV: CPT | Mod: PBBFAC,,, | Performed by: OBSTETRICS & GYNECOLOGY

## 2021-04-13 PROCEDURE — 3288F PR FALLS RISK ASSESSMENT DOCUMENTED: ICD-10-PCS | Mod: CPTII,S$GLB,, | Performed by: OBSTETRICS & GYNECOLOGY

## 2021-04-13 PROCEDURE — 99205 PR OFFICE/OUTPT VISIT, NEW, LEVL V, 60-74 MIN: ICD-10-PCS | Mod: 25,S$GLB,, | Performed by: OBSTETRICS & GYNECOLOGY

## 2021-04-13 PROCEDURE — 99999 PR PBB SHADOW E&M-EST. PATIENT-LVL IV: ICD-10-PCS | Mod: PBBFAC,,, | Performed by: OBSTETRICS & GYNECOLOGY

## 2021-04-13 PROCEDURE — 99205 OFFICE O/P NEW HI 60 MIN: CPT | Mod: 25,S$GLB,, | Performed by: OBSTETRICS & GYNECOLOGY

## 2021-04-13 PROCEDURE — 1101F PT FALLS ASSESS-DOCD LE1/YR: CPT | Mod: CPTII,S$GLB,, | Performed by: OBSTETRICS & GYNECOLOGY

## 2021-04-13 PROCEDURE — 3008F BODY MASS INDEX DOCD: CPT | Mod: CPTII,S$GLB,, | Performed by: OBSTETRICS & GYNECOLOGY

## 2021-04-15 LAB — BACTERIA UR CULT: NO GROWTH

## 2021-04-15 RX ORDER — CELECOXIB 200 MG/1
CAPSULE ORAL
Qty: 30 CAPSULE | Refills: 5 | Status: SHIPPED | OUTPATIENT
Start: 2021-04-15 | End: 2021-11-16

## 2021-04-26 ENCOUNTER — PATIENT MESSAGE (OUTPATIENT)
Dept: ORTHOPEDICS | Facility: CLINIC | Age: 76
End: 2021-04-26

## 2021-04-26 DIAGNOSIS — Z96.651 STATUS POST TOTAL RIGHT KNEE REPLACEMENT: Primary | ICD-10-CM

## 2021-04-26 RX ORDER — GABAPENTIN 300 MG/1
300 CAPSULE ORAL 3 TIMES DAILY
Qty: 90 CAPSULE | Refills: 2 | Status: SHIPPED | OUTPATIENT
Start: 2021-04-26 | End: 2021-11-16

## 2021-05-12 ENCOUNTER — PATIENT MESSAGE (OUTPATIENT)
Dept: ORTHOPEDICS | Facility: CLINIC | Age: 76
End: 2021-05-12

## 2021-05-13 ENCOUNTER — PATIENT MESSAGE (OUTPATIENT)
Dept: ORTHOPEDICS | Facility: CLINIC | Age: 76
End: 2021-05-13

## 2021-05-24 ENCOUNTER — PATIENT MESSAGE (OUTPATIENT)
Dept: ORTHOPEDICS | Facility: CLINIC | Age: 76
End: 2021-05-24

## 2021-05-24 DIAGNOSIS — Z96.651 STATUS POST TOTAL RIGHT KNEE REPLACEMENT: Primary | ICD-10-CM

## 2021-05-25 ENCOUNTER — OFFICE VISIT (OUTPATIENT)
Dept: ORTHOPEDICS | Facility: CLINIC | Age: 76
End: 2021-05-25
Payer: MEDICARE

## 2021-05-25 ENCOUNTER — HOSPITAL ENCOUNTER (OUTPATIENT)
Dept: RADIOLOGY | Facility: HOSPITAL | Age: 76
Discharge: HOME | End: 2021-05-25
Attending: ORTHOPAEDIC SURGERY
Payer: MEDICARE

## 2021-05-25 VITALS — HEIGHT: 70 IN | WEIGHT: 177 LBS | BODY MASS INDEX: 25.34 KG/M2

## 2021-05-25 DIAGNOSIS — Z96.651 STATUS POST TOTAL RIGHT KNEE REPLACEMENT: ICD-10-CM

## 2021-05-25 DIAGNOSIS — Z96.651 STATUS POST TOTAL RIGHT KNEE REPLACEMENT: Primary | ICD-10-CM

## 2021-05-25 PROCEDURE — 73562 X-RAY EXAM OF KNEE 3: CPT | Mod: 26,RT,, | Performed by: RADIOLOGY

## 2021-05-25 PROCEDURE — 99999 PR PBB SHADOW E&M-EST. PATIENT-LVL III: CPT | Mod: PBBFAC,,, | Performed by: ORTHOPAEDIC SURGERY

## 2021-05-25 PROCEDURE — 1101F PT FALLS ASSESS-DOCD LE1/YR: CPT | Mod: CPTII,S$GLB,, | Performed by: ORTHOPAEDIC SURGERY

## 2021-05-25 PROCEDURE — 1159F MED LIST DOCD IN RCRD: CPT | Mod: S$GLB,,, | Performed by: ORTHOPAEDIC SURGERY

## 2021-05-25 PROCEDURE — 1125F AMNT PAIN NOTED PAIN PRSNT: CPT | Mod: S$GLB,,, | Performed by: ORTHOPAEDIC SURGERY

## 2021-05-25 PROCEDURE — 1159F PR MEDICATION LIST DOCUMENTED IN MEDICAL RECORD: ICD-10-PCS | Mod: S$GLB,,, | Performed by: ORTHOPAEDIC SURGERY

## 2021-05-25 PROCEDURE — 73562 XR KNEE 3 VIEW RIGHT: ICD-10-PCS | Mod: 26,RT,, | Performed by: RADIOLOGY

## 2021-05-25 PROCEDURE — 99213 OFFICE O/P EST LOW 20 MIN: CPT | Mod: S$GLB,,, | Performed by: ORTHOPAEDIC SURGERY

## 2021-05-25 PROCEDURE — 1125F PR PAIN SEVERITY QUANTIFIED, PAIN PRESENT: ICD-10-PCS | Mod: S$GLB,,, | Performed by: ORTHOPAEDIC SURGERY

## 2021-05-25 PROCEDURE — 99999 PR PBB SHADOW E&M-EST. PATIENT-LVL III: ICD-10-PCS | Mod: PBBFAC,,, | Performed by: ORTHOPAEDIC SURGERY

## 2021-05-25 PROCEDURE — 3288F PR FALLS RISK ASSESSMENT DOCUMENTED: ICD-10-PCS | Mod: CPTII,S$GLB,, | Performed by: ORTHOPAEDIC SURGERY

## 2021-05-25 PROCEDURE — 3008F PR BODY MASS INDEX (BMI) DOCUMENTED: ICD-10-PCS | Mod: CPTII,S$GLB,, | Performed by: ORTHOPAEDIC SURGERY

## 2021-05-25 PROCEDURE — 3288F FALL RISK ASSESSMENT DOCD: CPT | Mod: CPTII,S$GLB,, | Performed by: ORTHOPAEDIC SURGERY

## 2021-05-25 PROCEDURE — 3008F BODY MASS INDEX DOCD: CPT | Mod: CPTII,S$GLB,, | Performed by: ORTHOPAEDIC SURGERY

## 2021-05-25 PROCEDURE — 73562 X-RAY EXAM OF KNEE 3: CPT | Mod: TC,RT

## 2021-05-25 PROCEDURE — 1101F PR PT FALLS ASSESS DOC 0-1 FALLS W/OUT INJ PAST YR: ICD-10-PCS | Mod: CPTII,S$GLB,, | Performed by: ORTHOPAEDIC SURGERY

## 2021-05-25 PROCEDURE — 99213 PR OFFICE/OUTPT VISIT, EST, LEVL III, 20-29 MIN: ICD-10-PCS | Mod: S$GLB,,, | Performed by: ORTHOPAEDIC SURGERY

## 2021-05-28 ENCOUNTER — OFFICE VISIT (OUTPATIENT)
Dept: URGENT CARE | Facility: CLINIC | Age: 76
End: 2021-05-28
Payer: MEDICARE

## 2021-05-28 VITALS
WEIGHT: 177 LBS | RESPIRATION RATE: 16 BRPM | TEMPERATURE: 98 F | DIASTOLIC BLOOD PRESSURE: 63 MMHG | BODY MASS INDEX: 25.34 KG/M2 | HEIGHT: 70 IN | HEART RATE: 60 BPM | SYSTOLIC BLOOD PRESSURE: 120 MMHG | OXYGEN SATURATION: 97 %

## 2021-05-28 DIAGNOSIS — B02.9 HERPES ZOSTER WITHOUT COMPLICATION: Primary | ICD-10-CM

## 2021-05-28 PROCEDURE — 1125F AMNT PAIN NOTED PAIN PRSNT: CPT | Mod: S$GLB,,, | Performed by: NURSE PRACTITIONER

## 2021-05-28 PROCEDURE — 99214 PR OFFICE/OUTPT VISIT, EST, LEVL IV, 30-39 MIN: ICD-10-PCS | Mod: S$GLB,,, | Performed by: NURSE PRACTITIONER

## 2021-05-28 PROCEDURE — 3008F BODY MASS INDEX DOCD: CPT | Mod: CPTII,S$GLB,, | Performed by: NURSE PRACTITIONER

## 2021-05-28 PROCEDURE — 99214 OFFICE O/P EST MOD 30 MIN: CPT | Mod: S$GLB,,, | Performed by: NURSE PRACTITIONER

## 2021-05-28 PROCEDURE — 3008F PR BODY MASS INDEX (BMI) DOCUMENTED: ICD-10-PCS | Mod: CPTII,S$GLB,, | Performed by: NURSE PRACTITIONER

## 2021-05-28 PROCEDURE — 1125F PR PAIN SEVERITY QUANTIFIED, PAIN PRESENT: ICD-10-PCS | Mod: S$GLB,,, | Performed by: NURSE PRACTITIONER

## 2021-05-28 RX ORDER — TRIAMCINOLONE ACETONIDE 1 MG/G
CREAM TOPICAL 2 TIMES DAILY
Qty: 28.4 G | Refills: 0 | Status: SHIPPED | OUTPATIENT
Start: 2021-05-28 | End: 2021-11-16

## 2021-05-28 RX ORDER — VALACYCLOVIR HYDROCHLORIDE 1 G/1
1000 TABLET, FILM COATED ORAL 3 TIMES DAILY
Qty: 21 TABLET | Refills: 0 | Status: SHIPPED | OUTPATIENT
Start: 2021-05-28 | End: 2021-07-26 | Stop reason: SDUPTHER

## 2021-06-17 ENCOUNTER — OFFICE VISIT (OUTPATIENT)
Dept: DERMATOLOGY | Facility: CLINIC | Age: 76
End: 2021-06-17
Payer: MEDICARE

## 2021-06-17 ENCOUNTER — PATIENT MESSAGE (OUTPATIENT)
Dept: INTERNAL MEDICINE | Facility: CLINIC | Age: 76
End: 2021-06-17

## 2021-06-17 DIAGNOSIS — L82.1 SEBORRHEIC KERATOSES: ICD-10-CM

## 2021-06-17 DIAGNOSIS — L57.0 AK (ACTINIC KERATOSIS): Primary | ICD-10-CM

## 2021-06-17 DIAGNOSIS — Z12.83 SCREENING FOR SKIN CANCER: ICD-10-CM

## 2021-06-17 DIAGNOSIS — D18.01 CHERRY ANGIOMA: ICD-10-CM

## 2021-06-17 DIAGNOSIS — D22.9 MULTIPLE BENIGN NEVI: ICD-10-CM

## 2021-06-17 DIAGNOSIS — L81.4 LENTIGO: ICD-10-CM

## 2021-06-17 DIAGNOSIS — D23.9 INTRADERMAL NEVUS: ICD-10-CM

## 2021-06-17 PROCEDURE — 99999 PR PBB SHADOW E&M-EST. PATIENT-LVL III: ICD-10-PCS | Mod: PBBFAC,,, | Performed by: DERMATOLOGY

## 2021-06-17 PROCEDURE — 3288F PR FALLS RISK ASSESSMENT DOCUMENTED: ICD-10-PCS | Mod: CPTII,S$GLB,, | Performed by: DERMATOLOGY

## 2021-06-17 PROCEDURE — 17000 DESTRUCT PREMALG LESION: CPT | Mod: S$GLB,,, | Performed by: DERMATOLOGY

## 2021-06-17 PROCEDURE — 17000 PR DESTRUCTION(LASER SURGERY,CRYOSURGERY,CHEMOSURGERY),PREMALIGNANT LESIONS,FIRST LESION: ICD-10-PCS | Mod: S$GLB,,, | Performed by: DERMATOLOGY

## 2021-06-17 PROCEDURE — 1126F AMNT PAIN NOTED NONE PRSNT: CPT | Mod: S$GLB,,, | Performed by: DERMATOLOGY

## 2021-06-17 PROCEDURE — 1101F PT FALLS ASSESS-DOCD LE1/YR: CPT | Mod: CPTII,S$GLB,, | Performed by: DERMATOLOGY

## 2021-06-17 PROCEDURE — 99213 OFFICE O/P EST LOW 20 MIN: CPT | Mod: 25,S$GLB,, | Performed by: DERMATOLOGY

## 2021-06-17 PROCEDURE — 1126F PR PAIN SEVERITY QUANTIFIED, NO PAIN PRESENT: ICD-10-PCS | Mod: S$GLB,,, | Performed by: DERMATOLOGY

## 2021-06-17 PROCEDURE — 99213 PR OFFICE/OUTPT VISIT, EST, LEVL III, 20-29 MIN: ICD-10-PCS | Mod: 25,S$GLB,, | Performed by: DERMATOLOGY

## 2021-06-17 PROCEDURE — 3288F FALL RISK ASSESSMENT DOCD: CPT | Mod: CPTII,S$GLB,, | Performed by: DERMATOLOGY

## 2021-06-17 PROCEDURE — 99999 PR PBB SHADOW E&M-EST. PATIENT-LVL III: CPT | Mod: PBBFAC,,, | Performed by: DERMATOLOGY

## 2021-06-17 PROCEDURE — 1101F PR PT FALLS ASSESS DOC 0-1 FALLS W/OUT INJ PAST YR: ICD-10-PCS | Mod: CPTII,S$GLB,, | Performed by: DERMATOLOGY

## 2021-06-21 ENCOUNTER — PATIENT MESSAGE (OUTPATIENT)
Dept: DERMATOLOGY | Facility: CLINIC | Age: 76
End: 2021-06-21

## 2021-07-25 ENCOUNTER — PATIENT MESSAGE (OUTPATIENT)
Dept: INTERNAL MEDICINE | Facility: CLINIC | Age: 76
End: 2021-07-25

## 2021-07-26 ENCOUNTER — PATIENT MESSAGE (OUTPATIENT)
Dept: INTERNAL MEDICINE | Facility: CLINIC | Age: 76
End: 2021-07-26

## 2021-07-26 ENCOUNTER — OFFICE VISIT (OUTPATIENT)
Dept: URGENT CARE | Facility: CLINIC | Age: 76
End: 2021-07-26
Payer: MEDICARE

## 2021-07-26 VITALS
OXYGEN SATURATION: 96 % | SYSTOLIC BLOOD PRESSURE: 154 MMHG | HEART RATE: 55 BPM | DIASTOLIC BLOOD PRESSURE: 82 MMHG | HEIGHT: 70 IN | TEMPERATURE: 98 F | RESPIRATION RATE: 16 BRPM | BODY MASS INDEX: 24.34 KG/M2 | WEIGHT: 170 LBS

## 2021-07-26 DIAGNOSIS — B02.9 HERPES ZOSTER WITHOUT COMPLICATION: Primary | ICD-10-CM

## 2021-07-26 DIAGNOSIS — R03.0 ELEVATED BP WITHOUT DIAGNOSIS OF HYPERTENSION: ICD-10-CM

## 2021-07-26 DIAGNOSIS — R52 BODY ACHES: ICD-10-CM

## 2021-07-26 PROCEDURE — 1160F PR REVIEW ALL MEDS BY PRESCRIBER/CLIN PHARMACIST DOCUMENTED: ICD-10-PCS | Mod: CPTII,S$GLB,, | Performed by: NURSE PRACTITIONER

## 2021-07-26 PROCEDURE — 3008F BODY MASS INDEX DOCD: CPT | Mod: CPTII,S$GLB,, | Performed by: NURSE PRACTITIONER

## 2021-07-26 PROCEDURE — 99214 PR OFFICE/OUTPT VISIT, EST, LEVL IV, 30-39 MIN: ICD-10-PCS | Mod: S$GLB,,, | Performed by: NURSE PRACTITIONER

## 2021-07-26 PROCEDURE — 1159F MED LIST DOCD IN RCRD: CPT | Mod: CPTII,S$GLB,, | Performed by: NURSE PRACTITIONER

## 2021-07-26 PROCEDURE — 1159F PR MEDICATION LIST DOCUMENTED IN MEDICAL RECORD: ICD-10-PCS | Mod: CPTII,S$GLB,, | Performed by: NURSE PRACTITIONER

## 2021-07-26 PROCEDURE — 3008F PR BODY MASS INDEX (BMI) DOCUMENTED: ICD-10-PCS | Mod: CPTII,S$GLB,, | Performed by: NURSE PRACTITIONER

## 2021-07-26 PROCEDURE — 1160F RVW MEDS BY RX/DR IN RCRD: CPT | Mod: CPTII,S$GLB,, | Performed by: NURSE PRACTITIONER

## 2021-07-26 PROCEDURE — 99214 OFFICE O/P EST MOD 30 MIN: CPT | Mod: S$GLB,,, | Performed by: NURSE PRACTITIONER

## 2021-07-26 RX ORDER — VALACYCLOVIR HYDROCHLORIDE 1 G/1
1000 TABLET, FILM COATED ORAL 3 TIMES DAILY
Qty: 21 TABLET | Refills: 0 | Status: SHIPPED | OUTPATIENT
Start: 2021-07-26 | End: 2021-08-02

## 2021-08-20 ENCOUNTER — PATIENT MESSAGE (OUTPATIENT)
Dept: INTERNAL MEDICINE | Facility: CLINIC | Age: 76
End: 2021-08-20

## 2021-08-21 ENCOUNTER — OFFICE VISIT (OUTPATIENT)
Dept: URGENT CARE | Facility: CLINIC | Age: 76
End: 2021-08-21
Payer: MEDICARE

## 2021-08-21 VITALS
OXYGEN SATURATION: 95 % | WEIGHT: 170 LBS | BODY MASS INDEX: 24.34 KG/M2 | DIASTOLIC BLOOD PRESSURE: 68 MMHG | HEART RATE: 57 BPM | SYSTOLIC BLOOD PRESSURE: 135 MMHG | HEIGHT: 70 IN | RESPIRATION RATE: 16 BRPM | TEMPERATURE: 98 F

## 2021-08-21 DIAGNOSIS — B02.9 HERPES ZOSTER WITHOUT COMPLICATION: Primary | ICD-10-CM

## 2021-08-21 PROCEDURE — 3008F BODY MASS INDEX DOCD: CPT | Mod: CPTII,S$GLB,, | Performed by: NURSE PRACTITIONER

## 2021-08-21 PROCEDURE — 1160F RVW MEDS BY RX/DR IN RCRD: CPT | Mod: CPTII,S$GLB,, | Performed by: NURSE PRACTITIONER

## 2021-08-21 PROCEDURE — 1159F MED LIST DOCD IN RCRD: CPT | Mod: CPTII,S$GLB,, | Performed by: NURSE PRACTITIONER

## 2021-08-21 PROCEDURE — 3078F PR MOST RECENT DIASTOLIC BLOOD PRESSURE < 80 MM HG: ICD-10-PCS | Mod: CPTII,S$GLB,, | Performed by: NURSE PRACTITIONER

## 2021-08-21 PROCEDURE — 1160F PR REVIEW ALL MEDS BY PRESCRIBER/CLIN PHARMACIST DOCUMENTED: ICD-10-PCS | Mod: CPTII,S$GLB,, | Performed by: NURSE PRACTITIONER

## 2021-08-21 PROCEDURE — 3078F DIAST BP <80 MM HG: CPT | Mod: CPTII,S$GLB,, | Performed by: NURSE PRACTITIONER

## 2021-08-21 PROCEDURE — 3075F SYST BP GE 130 - 139MM HG: CPT | Mod: CPTII,S$GLB,, | Performed by: NURSE PRACTITIONER

## 2021-08-21 PROCEDURE — 3075F PR MOST RECENT SYSTOLIC BLOOD PRESS GE 130-139MM HG: ICD-10-PCS | Mod: CPTII,S$GLB,, | Performed by: NURSE PRACTITIONER

## 2021-08-21 PROCEDURE — 3008F PR BODY MASS INDEX (BMI) DOCUMENTED: ICD-10-PCS | Mod: CPTII,S$GLB,, | Performed by: NURSE PRACTITIONER

## 2021-08-21 PROCEDURE — 99213 OFFICE O/P EST LOW 20 MIN: CPT | Mod: S$GLB,,, | Performed by: NURSE PRACTITIONER

## 2021-08-21 PROCEDURE — 1159F PR MEDICATION LIST DOCUMENTED IN MEDICAL RECORD: ICD-10-PCS | Mod: CPTII,S$GLB,, | Performed by: NURSE PRACTITIONER

## 2021-08-21 PROCEDURE — 99213 PR OFFICE/OUTPT VISIT, EST, LEVL III, 20-29 MIN: ICD-10-PCS | Mod: S$GLB,,, | Performed by: NURSE PRACTITIONER

## 2021-08-21 RX ORDER — VALACYCLOVIR HYDROCHLORIDE 1 G/1
1000 TABLET, FILM COATED ORAL 2 TIMES DAILY
Qty: 60 TABLET | Refills: 11 | Status: SHIPPED | OUTPATIENT
Start: 2021-08-21 | End: 2022-12-05

## 2021-08-22 ENCOUNTER — PATIENT MESSAGE (OUTPATIENT)
Dept: DERMATOLOGY | Facility: CLINIC | Age: 76
End: 2021-08-22

## 2021-08-25 ENCOUNTER — TELEPHONE (OUTPATIENT)
Dept: URGENT CARE | Facility: CLINIC | Age: 76
End: 2021-08-25

## 2021-09-16 ENCOUNTER — PATIENT MESSAGE (OUTPATIENT)
Dept: DERMATOLOGY | Facility: CLINIC | Age: 76
End: 2021-09-16

## 2021-09-21 ENCOUNTER — PATIENT MESSAGE (OUTPATIENT)
Dept: ORTHOPEDICS | Facility: CLINIC | Age: 76
End: 2021-09-21

## 2021-09-23 ENCOUNTER — OFFICE VISIT (OUTPATIENT)
Dept: DERMATOLOGY | Facility: CLINIC | Age: 76
End: 2021-09-23
Payer: MEDICARE

## 2021-09-23 DIAGNOSIS — B02.9 HERPES ZOSTER WITHOUT COMPLICATION: Primary | ICD-10-CM

## 2021-09-23 PROCEDURE — 1126F AMNT PAIN NOTED NONE PRSNT: CPT | Mod: CPTII,S$GLB,, | Performed by: STUDENT IN AN ORGANIZED HEALTH CARE EDUCATION/TRAINING PROGRAM

## 2021-09-23 PROCEDURE — 1126F PR PAIN SEVERITY QUANTIFIED, NO PAIN PRESENT: ICD-10-PCS | Mod: CPTII,S$GLB,, | Performed by: STUDENT IN AN ORGANIZED HEALTH CARE EDUCATION/TRAINING PROGRAM

## 2021-09-23 PROCEDURE — 1159F PR MEDICATION LIST DOCUMENTED IN MEDICAL RECORD: ICD-10-PCS | Mod: CPTII,S$GLB,, | Performed by: STUDENT IN AN ORGANIZED HEALTH CARE EDUCATION/TRAINING PROGRAM

## 2021-09-23 PROCEDURE — 1160F PR REVIEW ALL MEDS BY PRESCRIBER/CLIN PHARMACIST DOCUMENTED: ICD-10-PCS | Mod: CPTII,S$GLB,, | Performed by: STUDENT IN AN ORGANIZED HEALTH CARE EDUCATION/TRAINING PROGRAM

## 2021-09-23 PROCEDURE — 1101F PT FALLS ASSESS-DOCD LE1/YR: CPT | Mod: CPTII,S$GLB,, | Performed by: STUDENT IN AN ORGANIZED HEALTH CARE EDUCATION/TRAINING PROGRAM

## 2021-09-23 PROCEDURE — 99213 OFFICE O/P EST LOW 20 MIN: CPT | Mod: S$GLB,,, | Performed by: STUDENT IN AN ORGANIZED HEALTH CARE EDUCATION/TRAINING PROGRAM

## 2021-09-23 PROCEDURE — 99213 PR OFFICE/OUTPT VISIT, EST, LEVL III, 20-29 MIN: ICD-10-PCS | Mod: S$GLB,,, | Performed by: STUDENT IN AN ORGANIZED HEALTH CARE EDUCATION/TRAINING PROGRAM

## 2021-09-23 PROCEDURE — 1160F RVW MEDS BY RX/DR IN RCRD: CPT | Mod: CPTII,S$GLB,, | Performed by: STUDENT IN AN ORGANIZED HEALTH CARE EDUCATION/TRAINING PROGRAM

## 2021-09-23 PROCEDURE — 1159F MED LIST DOCD IN RCRD: CPT | Mod: CPTII,S$GLB,, | Performed by: STUDENT IN AN ORGANIZED HEALTH CARE EDUCATION/TRAINING PROGRAM

## 2021-09-23 PROCEDURE — 3288F FALL RISK ASSESSMENT DOCD: CPT | Mod: CPTII,S$GLB,, | Performed by: STUDENT IN AN ORGANIZED HEALTH CARE EDUCATION/TRAINING PROGRAM

## 2021-09-23 PROCEDURE — 3288F PR FALLS RISK ASSESSMENT DOCUMENTED: ICD-10-PCS | Mod: CPTII,S$GLB,, | Performed by: STUDENT IN AN ORGANIZED HEALTH CARE EDUCATION/TRAINING PROGRAM

## 2021-09-23 PROCEDURE — 99999 PR PBB SHADOW E&M-EST. PATIENT-LVL III: ICD-10-PCS | Mod: PBBFAC,,, | Performed by: STUDENT IN AN ORGANIZED HEALTH CARE EDUCATION/TRAINING PROGRAM

## 2021-09-23 PROCEDURE — 99999 PR PBB SHADOW E&M-EST. PATIENT-LVL III: CPT | Mod: PBBFAC,,, | Performed by: STUDENT IN AN ORGANIZED HEALTH CARE EDUCATION/TRAINING PROGRAM

## 2021-09-23 PROCEDURE — 1101F PR PT FALLS ASSESS DOC 0-1 FALLS W/OUT INJ PAST YR: ICD-10-PCS | Mod: CPTII,S$GLB,, | Performed by: STUDENT IN AN ORGANIZED HEALTH CARE EDUCATION/TRAINING PROGRAM

## 2021-10-02 ENCOUNTER — IMMUNIZATION (OUTPATIENT)
Dept: INTERNAL MEDICINE | Facility: CLINIC | Age: 76
End: 2021-10-02
Payer: MEDICARE

## 2021-10-02 DIAGNOSIS — Z23 NEED FOR VACCINATION: Primary | ICD-10-CM

## 2021-10-02 PROCEDURE — 91300 COVID-19, MRNA, LNP-S, PF, 30 MCG/0.3 ML DOSE VACCINE: CPT | Mod: HCNC,PBBFAC | Performed by: INTERNAL MEDICINE

## 2021-10-02 PROCEDURE — 0003A COVID-19, MRNA, LNP-S, PF, 30 MCG/0.3 ML DOSE VACCINE: CPT | Mod: HCNC,CV19,PBBFAC | Performed by: INTERNAL MEDICINE

## 2021-10-06 ENCOUNTER — PATIENT MESSAGE (OUTPATIENT)
Dept: INTERNAL MEDICINE | Facility: CLINIC | Age: 76
End: 2021-10-06

## 2021-10-06 DIAGNOSIS — Z12.31 ENCOUNTER FOR SCREENING MAMMOGRAM FOR BREAST CANCER: Primary | ICD-10-CM

## 2021-10-19 ENCOUNTER — HOSPITAL ENCOUNTER (OUTPATIENT)
Dept: RADIOLOGY | Facility: HOSPITAL | Age: 76
Discharge: HOME | End: 2021-10-19
Attending: INTERNAL MEDICINE
Payer: MEDICARE

## 2021-10-19 VITALS — HEIGHT: 70 IN | BODY MASS INDEX: 24.62 KG/M2 | WEIGHT: 172 LBS

## 2021-10-19 DIAGNOSIS — Z12.31 ENCOUNTER FOR SCREENING MAMMOGRAM FOR BREAST CANCER: ICD-10-CM

## 2021-10-19 PROCEDURE — 77067 SCR MAMMO BI INCL CAD: CPT | Mod: 26,HCNC,, | Performed by: RADIOLOGY

## 2021-10-19 PROCEDURE — 77067 SCR MAMMO BI INCL CAD: CPT | Mod: TC,HCNC

## 2021-10-19 PROCEDURE — 77063 MAMMO DIGITAL SCREENING BILAT WITH TOMO: ICD-10-PCS | Mod: 26,HCNC,, | Performed by: RADIOLOGY

## 2021-10-19 PROCEDURE — 77063 BREAST TOMOSYNTHESIS BI: CPT | Mod: 26,HCNC,, | Performed by: RADIOLOGY

## 2021-10-19 PROCEDURE — 77067 MAMMO DIGITAL SCREENING BILAT WITH TOMO: ICD-10-PCS | Mod: 26,HCNC,, | Performed by: RADIOLOGY

## 2021-11-16 ENCOUNTER — LAB VISIT (OUTPATIENT)
Dept: LAB | Facility: HOSPITAL | Age: 76
End: 2021-11-16
Attending: INTERNAL MEDICINE
Payer: MEDICARE

## 2021-11-16 ENCOUNTER — OFFICE VISIT (OUTPATIENT)
Dept: INTERNAL MEDICINE | Facility: CLINIC | Age: 76
End: 2021-11-16
Payer: MEDICARE

## 2021-11-16 ENCOUNTER — IMMUNIZATION (OUTPATIENT)
Dept: INTERNAL MEDICINE | Facility: CLINIC | Age: 76
End: 2021-11-16
Payer: MEDICARE

## 2021-11-16 VITALS
SYSTOLIC BLOOD PRESSURE: 122 MMHG | DIASTOLIC BLOOD PRESSURE: 80 MMHG | WEIGHT: 174 LBS | OXYGEN SATURATION: 97 % | BODY MASS INDEX: 24.91 KG/M2 | HEIGHT: 70 IN | HEART RATE: 70 BPM

## 2021-11-16 DIAGNOSIS — Z00.00 ANNUAL PHYSICAL EXAM: Primary | ICD-10-CM

## 2021-11-16 DIAGNOSIS — Z00.00 ANNUAL PHYSICAL EXAM: ICD-10-CM

## 2021-11-16 DIAGNOSIS — L60.9 FINGERNAIL ABNORMALITIES: ICD-10-CM

## 2021-11-16 DIAGNOSIS — B35.1 ONYCHOMYCOSIS: ICD-10-CM

## 2021-11-16 LAB
ALBUMIN SERPL BCP-MCNC: 4.2 G/DL (ref 3.5–5.2)
ALP SERPL-CCNC: 66 U/L (ref 55–135)
ALT SERPL W/O P-5'-P-CCNC: 19 U/L (ref 10–44)
ANION GAP SERPL CALC-SCNC: 10 MMOL/L (ref 8–16)
AST SERPL-CCNC: 23 U/L (ref 10–40)
BASOPHILS # BLD AUTO: 0.04 K/UL (ref 0–0.2)
BASOPHILS NFR BLD: 0.7 % (ref 0–1.9)
BILIRUB SERPL-MCNC: 0.4 MG/DL (ref 0.1–1)
BUN SERPL-MCNC: 19 MG/DL (ref 8–23)
CALCIUM SERPL-MCNC: 9.8 MG/DL (ref 8.7–10.5)
CHLORIDE SERPL-SCNC: 105 MMOL/L (ref 95–110)
CO2 SERPL-SCNC: 26 MMOL/L (ref 23–29)
CREAT SERPL-MCNC: 0.7 MG/DL (ref 0.5–1.4)
DIFFERENTIAL METHOD: ABNORMAL
EOSINOPHIL # BLD AUTO: 0.2 K/UL (ref 0–0.5)
EOSINOPHIL NFR BLD: 2.7 % (ref 0–8)
ERYTHROCYTE [DISTWIDTH] IN BLOOD BY AUTOMATED COUNT: 12.8 % (ref 11.5–14.5)
EST. GFR  (AFRICAN AMERICAN): >60 ML/MIN/1.73 M^2
EST. GFR  (NON AFRICAN AMERICAN): >60 ML/MIN/1.73 M^2
GLUCOSE SERPL-MCNC: 95 MG/DL (ref 70–110)
HCT VFR BLD AUTO: 38.9 % (ref 37–48.5)
HGB BLD-MCNC: 13.2 G/DL (ref 12–16)
IMM GRANULOCYTES # BLD AUTO: 0.01 K/UL (ref 0–0.04)
IMM GRANULOCYTES NFR BLD AUTO: 0.2 % (ref 0–0.5)
LYMPHOCYTES # BLD AUTO: 1.4 K/UL (ref 1–4.8)
LYMPHOCYTES NFR BLD: 26.3 % (ref 18–48)
MCH RBC QN AUTO: 33.9 PG (ref 27–31)
MCHC RBC AUTO-ENTMCNC: 33.9 G/DL (ref 32–36)
MCV RBC AUTO: 100 FL (ref 82–98)
MONOCYTES # BLD AUTO: 0.4 K/UL (ref 0.3–1)
MONOCYTES NFR BLD: 6.8 % (ref 4–15)
NEUTROPHILS # BLD AUTO: 3.5 K/UL (ref 1.8–7.7)
NEUTROPHILS NFR BLD: 63.3 % (ref 38–73)
NRBC BLD-RTO: 0 /100 WBC
PLATELET # BLD AUTO: 228 K/UL (ref 150–450)
PMV BLD AUTO: 11.5 FL (ref 9.2–12.9)
POTASSIUM SERPL-SCNC: 4.3 MMOL/L (ref 3.5–5.1)
PROT SERPL-MCNC: 6.8 G/DL (ref 6–8.4)
RBC # BLD AUTO: 3.89 M/UL (ref 4–5.4)
SODIUM SERPL-SCNC: 141 MMOL/L (ref 136–145)
WBC # BLD AUTO: 5.47 K/UL (ref 3.9–12.7)

## 2021-11-16 PROCEDURE — G0008 ADMIN INFLUENZA VIRUS VAC: HCPCS | Mod: HCNC,S$GLB,, | Performed by: INTERNAL MEDICINE

## 2021-11-16 PROCEDURE — 3074F PR MOST RECENT SYSTOLIC BLOOD PRESSURE < 130 MM HG: ICD-10-PCS | Mod: HCNC,CPTII,S$GLB, | Performed by: INTERNAL MEDICINE

## 2021-11-16 PROCEDURE — 1101F PR PT FALLS ASSESS DOC 0-1 FALLS W/OUT INJ PAST YR: ICD-10-PCS | Mod: HCNC,CPTII,S$GLB, | Performed by: INTERNAL MEDICINE

## 2021-11-16 PROCEDURE — 3008F BODY MASS INDEX DOCD: CPT | Mod: HCNC,CPTII,S$GLB, | Performed by: INTERNAL MEDICINE

## 2021-11-16 PROCEDURE — 36415 COLL VENOUS BLD VENIPUNCTURE: CPT | Mod: HCNC | Performed by: INTERNAL MEDICINE

## 2021-11-16 PROCEDURE — 1101F PT FALLS ASSESS-DOCD LE1/YR: CPT | Mod: HCNC,CPTII,S$GLB, | Performed by: INTERNAL MEDICINE

## 2021-11-16 PROCEDURE — 1126F PR PAIN SEVERITY QUANTIFIED, NO PAIN PRESENT: ICD-10-PCS | Mod: HCNC,CPTII,S$GLB, | Performed by: INTERNAL MEDICINE

## 2021-11-16 PROCEDURE — 80053 COMPREHEN METABOLIC PANEL: CPT | Mod: HCNC | Performed by: INTERNAL MEDICINE

## 2021-11-16 PROCEDURE — 1160F PR REVIEW ALL MEDS BY PRESCRIBER/CLIN PHARMACIST DOCUMENTED: ICD-10-PCS | Mod: HCNC,CPTII,S$GLB, | Performed by: INTERNAL MEDICINE

## 2021-11-16 PROCEDURE — 3079F DIAST BP 80-89 MM HG: CPT | Mod: HCNC,CPTII,S$GLB, | Performed by: INTERNAL MEDICINE

## 2021-11-16 PROCEDURE — 90694 VACC AIIV4 NO PRSRV 0.5ML IM: CPT | Mod: HCNC,S$GLB,, | Performed by: INTERNAL MEDICINE

## 2021-11-16 PROCEDURE — 3074F SYST BP LT 130 MM HG: CPT | Mod: HCNC,CPTII,S$GLB, | Performed by: INTERNAL MEDICINE

## 2021-11-16 PROCEDURE — 1126F AMNT PAIN NOTED NONE PRSNT: CPT | Mod: HCNC,CPTII,S$GLB, | Performed by: INTERNAL MEDICINE

## 2021-11-16 PROCEDURE — 99499 UNLISTED E&M SERVICE: CPT | Mod: S$GLB,,, | Performed by: INTERNAL MEDICINE

## 2021-11-16 PROCEDURE — 3288F PR FALLS RISK ASSESSMENT DOCUMENTED: ICD-10-PCS | Mod: HCNC,CPTII,S$GLB, | Performed by: INTERNAL MEDICINE

## 2021-11-16 PROCEDURE — 1159F PR MEDICATION LIST DOCUMENTED IN MEDICAL RECORD: ICD-10-PCS | Mod: HCNC,CPTII,S$GLB, | Performed by: INTERNAL MEDICINE

## 2021-11-16 PROCEDURE — 85025 COMPLETE CBC W/AUTO DIFF WBC: CPT | Mod: HCNC | Performed by: INTERNAL MEDICINE

## 2021-11-16 PROCEDURE — 1159F MED LIST DOCD IN RCRD: CPT | Mod: HCNC,CPTII,S$GLB, | Performed by: INTERNAL MEDICINE

## 2021-11-16 PROCEDURE — 90694 FLU VACCINE - QUADRIVALENT - ADJUVANTED: ICD-10-PCS | Mod: HCNC,S$GLB,, | Performed by: INTERNAL MEDICINE

## 2021-11-16 PROCEDURE — 99397 PER PM REEVAL EST PAT 65+ YR: CPT | Mod: HCNC,25,S$GLB, | Performed by: INTERNAL MEDICINE

## 2021-11-16 PROCEDURE — 3288F FALL RISK ASSESSMENT DOCD: CPT | Mod: HCNC,CPTII,S$GLB, | Performed by: INTERNAL MEDICINE

## 2021-11-16 PROCEDURE — 99499 RISK ADDL DX/OHS AUDIT: ICD-10-PCS | Mod: S$GLB,,, | Performed by: INTERNAL MEDICINE

## 2021-11-16 PROCEDURE — 3008F PR BODY MASS INDEX (BMI) DOCUMENTED: ICD-10-PCS | Mod: HCNC,CPTII,S$GLB, | Performed by: INTERNAL MEDICINE

## 2021-11-16 PROCEDURE — 1160F RVW MEDS BY RX/DR IN RCRD: CPT | Mod: HCNC,CPTII,S$GLB, | Performed by: INTERNAL MEDICINE

## 2021-11-16 PROCEDURE — 99397 PR PREVENTIVE VISIT,EST,65 & OVER: ICD-10-PCS | Mod: HCNC,25,S$GLB, | Performed by: INTERNAL MEDICINE

## 2021-11-16 PROCEDURE — 99999 PR PBB SHADOW E&M-EST. PATIENT-LVL IV: CPT | Mod: PBBFAC,HCNC,, | Performed by: INTERNAL MEDICINE

## 2021-11-16 PROCEDURE — 3079F PR MOST RECENT DIASTOLIC BLOOD PRESSURE 80-89 MM HG: ICD-10-PCS | Mod: HCNC,CPTII,S$GLB, | Performed by: INTERNAL MEDICINE

## 2021-11-16 PROCEDURE — G0008 FLU VACCINE - QUADRIVALENT - ADJUVANTED: ICD-10-PCS | Mod: HCNC,S$GLB,, | Performed by: INTERNAL MEDICINE

## 2021-11-16 PROCEDURE — 99999 PR PBB SHADOW E&M-EST. PATIENT-LVL IV: ICD-10-PCS | Mod: PBBFAC,HCNC,, | Performed by: INTERNAL MEDICINE

## 2021-11-17 DIAGNOSIS — Z79.899 HIGH RISK MEDICATION USE: Primary | ICD-10-CM

## 2021-11-17 RX ORDER — TERBINAFINE HYDROCHLORIDE 250 MG/1
TABLET ORAL
Qty: 90 TABLET | Refills: 0 | Status: SHIPPED | OUTPATIENT
Start: 2021-11-17 | End: 2022-12-05

## 2021-11-18 ENCOUNTER — TELEPHONE (OUTPATIENT)
Dept: INTERNAL MEDICINE | Facility: CLINIC | Age: 76
End: 2021-11-18
Payer: MEDICARE

## 2022-01-13 ENCOUNTER — PATIENT MESSAGE (OUTPATIENT)
Dept: INTERNAL MEDICINE | Facility: CLINIC | Age: 77
End: 2022-01-13
Payer: MEDICARE

## 2022-05-10 ENCOUNTER — PATIENT MESSAGE (OUTPATIENT)
Dept: INTERNAL MEDICINE | Facility: CLINIC | Age: 77
End: 2022-05-10
Payer: MEDICARE

## 2022-05-10 NOTE — TELEPHONE ENCOUNTER
Called pt's to discuss when did symptoms start and when was positive confirmed. I left the pt a VM asking for a callback and MyChart message sent.

## 2022-05-11 ENCOUNTER — PATIENT MESSAGE (OUTPATIENT)
Dept: INTERNAL MEDICINE | Facility: CLINIC | Age: 77
End: 2022-05-11
Payer: MEDICARE

## 2022-05-11 DIAGNOSIS — U07.1 COVID-19 VIRUS INFECTION: Primary | ICD-10-CM

## 2022-06-04 ENCOUNTER — PATIENT MESSAGE (OUTPATIENT)
Dept: ORTHOPEDICS | Facility: CLINIC | Age: 77
End: 2022-06-04
Payer: MEDICARE

## 2022-06-04 ENCOUNTER — PATIENT MESSAGE (OUTPATIENT)
Dept: INTERNAL MEDICINE | Facility: CLINIC | Age: 77
End: 2022-06-04
Payer: MEDICARE

## 2022-06-06 DIAGNOSIS — M25.562 LEFT KNEE PAIN, UNSPECIFIED CHRONICITY: Primary | ICD-10-CM

## 2022-06-07 ENCOUNTER — OFFICE VISIT (OUTPATIENT)
Dept: ORTHOPEDICS | Facility: CLINIC | Age: 77
End: 2022-06-07
Payer: MEDICARE

## 2022-06-07 ENCOUNTER — HOSPITAL ENCOUNTER (OUTPATIENT)
Dept: RADIOLOGY | Facility: HOSPITAL | Age: 77
Discharge: HOME | End: 2022-06-07
Attending: ORTHOPAEDIC SURGERY
Payer: MEDICARE

## 2022-06-07 VITALS — HEIGHT: 70 IN | BODY MASS INDEX: 24.9 KG/M2 | WEIGHT: 173.94 LBS

## 2022-06-07 DIAGNOSIS — M17.12 PRIMARY OSTEOARTHRITIS OF LEFT KNEE: Primary | ICD-10-CM

## 2022-06-07 DIAGNOSIS — M25.562 LEFT KNEE PAIN, UNSPECIFIED CHRONICITY: ICD-10-CM

## 2022-06-07 PROCEDURE — 99999 PR PBB SHADOW E&M-EST. PATIENT-LVL II: ICD-10-PCS | Mod: PBBFAC,,, | Performed by: ORTHOPAEDIC SURGERY

## 2022-06-07 PROCEDURE — 73562 X-RAY EXAM OF KNEE 3: CPT | Mod: 26,RT,, | Performed by: RADIOLOGY

## 2022-06-07 PROCEDURE — 3008F PR BODY MASS INDEX (BMI) DOCUMENTED: ICD-10-PCS | Mod: CPTII,S$GLB,, | Performed by: ORTHOPAEDIC SURGERY

## 2022-06-07 PROCEDURE — 3008F BODY MASS INDEX DOCD: CPT | Mod: CPTII,S$GLB,, | Performed by: ORTHOPAEDIC SURGERY

## 2022-06-07 PROCEDURE — 73562 XR KNEE ORTHO LEFT WITH FLEXION: ICD-10-PCS | Mod: 26,RT,, | Performed by: RADIOLOGY

## 2022-06-07 PROCEDURE — 1125F AMNT PAIN NOTED PAIN PRSNT: CPT | Mod: CPTII,S$GLB,, | Performed by: ORTHOPAEDIC SURGERY

## 2022-06-07 PROCEDURE — 73564 X-RAY EXAM KNEE 4 OR MORE: CPT | Mod: 26,LT,, | Performed by: RADIOLOGY

## 2022-06-07 PROCEDURE — 99999 PR PBB SHADOW E&M-EST. PATIENT-LVL II: CPT | Mod: PBBFAC,,, | Performed by: ORTHOPAEDIC SURGERY

## 2022-06-07 PROCEDURE — 20610 LARGE JOINT ASPIRATION/INJECTION: L KNEE: ICD-10-PCS | Mod: LT,S$GLB,, | Performed by: ORTHOPAEDIC SURGERY

## 2022-06-07 PROCEDURE — 20610 DRAIN/INJ JOINT/BURSA W/O US: CPT | Mod: LT,S$GLB,, | Performed by: ORTHOPAEDIC SURGERY

## 2022-06-07 PROCEDURE — 3288F PR FALLS RISK ASSESSMENT DOCUMENTED: ICD-10-PCS | Mod: CPTII,S$GLB,, | Performed by: ORTHOPAEDIC SURGERY

## 2022-06-07 PROCEDURE — 3288F FALL RISK ASSESSMENT DOCD: CPT | Mod: CPTII,S$GLB,, | Performed by: ORTHOPAEDIC SURGERY

## 2022-06-07 PROCEDURE — 1101F PR PT FALLS ASSESS DOC 0-1 FALLS W/OUT INJ PAST YR: ICD-10-PCS | Mod: CPTII,S$GLB,, | Performed by: ORTHOPAEDIC SURGERY

## 2022-06-07 PROCEDURE — 1125F PR PAIN SEVERITY QUANTIFIED, PAIN PRESENT: ICD-10-PCS | Mod: CPTII,S$GLB,, | Performed by: ORTHOPAEDIC SURGERY

## 2022-06-07 PROCEDURE — 99213 PR OFFICE/OUTPT VISIT, EST, LEVL III, 20-29 MIN: ICD-10-PCS | Mod: 25,S$GLB,, | Performed by: ORTHOPAEDIC SURGERY

## 2022-06-07 PROCEDURE — 99213 OFFICE O/P EST LOW 20 MIN: CPT | Mod: 25,S$GLB,, | Performed by: ORTHOPAEDIC SURGERY

## 2022-06-07 PROCEDURE — 73562 X-RAY EXAM OF KNEE 3: CPT | Mod: TC,RT,59

## 2022-06-07 PROCEDURE — 1101F PT FALLS ASSESS-DOCD LE1/YR: CPT | Mod: CPTII,S$GLB,, | Performed by: ORTHOPAEDIC SURGERY

## 2022-06-07 PROCEDURE — 73564 XR KNEE ORTHO LEFT WITH FLEXION: ICD-10-PCS | Mod: 26,LT,, | Performed by: RADIOLOGY

## 2022-06-07 RX ADMIN — TRIAMCINOLONE ACETONIDE 40 MG: 40 INJECTION, SUSPENSION INTRA-ARTICULAR; INTRAMUSCULAR at 01:06

## 2022-06-07 NOTE — PROGRESS NOTES
Subjective:     HPI:   Aurora Castle is a 76 y.o. female who presents for eval L knee    S/p R TKA 6/8/20 - doing well    On the left side she has had intermittent symptoms for the past few months anterior and posterior knee.  Sunday night she has some swelling and aching and tried some Tylenol next morning she 1 bike ride and had some increasing pain and swelling after that    She has tried Aleve and Tylenol     Objective:   Body mass index is 24.96 kg/m².  Exam:  Mild left-sided limp and antalgic gait.   degree knee range of motion she says her flexion has been worse over the past few days than normal.  10° valgus alignment.  Tender palpation predominantly lateral but also medial and patellofemoral joint lines mild-to-moderate effusion knee stable anterior-posterior varus valgus stresses with flexion contracture but no extensor lag      Imaging:  Indication:  Left knee pain  Exam Ordered: Radiographs of the left knee include a standing anteroposterior view, a standing posterioanterior view, a lateral view in full flexion, and a sunrise view  Details of Examination: Exam shows evidence of joint space narrowing, osteophyte formation, and subchondral sclerosis, all consistent with degenerative arthritis of the knee.  No other significant findings are noted.  Impression:  Degenerative Arthritis, Left Knee    Grade 4 valgus L knee arthritis    R TKA no change      Assessment:       ICD-10-CM ICD-9-CM   1. Primary osteoarthritis of left knee  M17.12 715.16      Acute exacerbation L knee OA  R TKA doing well     Plan:       The above findings were discussed with patient length. We discussed the risks of conservative versus surgical management knee arthritis. Conservative management consisting of anti-inflammatory medications, glucosamine/chondroitin sulfate, weight loss, physical therapy, activity modification, as well as injections (lubricant versus corticosteroid) was discussed at length. At this point  considering the patient's level of activity, pain, and radiographic findings I recommend continued conservative management of the knee arthritis.     The patient was given a handout with treatment strategies for hip and knee joint care prior to surgery from [employer], the [employer].   This included information regarding medications, injections, weight loss, exercise, braces, physical therapy, and alternative therapies.     I explained the potentially adverse gastric, cardiac, and renal effects of NSAIDs and explained that if the patient wishes to take it for longer that the patient should discuss this with their primary care physician to determine if it is safe to do so.    x Tylenol   x Aleve    Voltaren Gel   [facility] info    Bursitis info    Total Joint Info    HEP: [employer] [employer] home exercise conditioning program     PT   x CSI: intra-articular steroid injection    CSI: greater trochanter bursitis    HA: hyaluronic acid injection   X has comp sleeve Brace:     Referral:      Rest, ice, activity modification    F/u PRN    Orders Placed This Encounter   Procedures    Large Joint Aspiration/Injection: L knee     This order was created via procedure documentation             Past Medical History:   Diagnosis Date    Arthritis     History of adenomatous polyp of colon     Joint pain     Pneumonia     during college     Tendonitis of ankle or foot     Trouble in sleeping     Urinary incontinence        Past Surgical History:   Procedure Laterality Date     SECTION          COLONOSCOPY N/A 2018    Procedure: COLONOSCOPY;  Surgeon: Jim Borges MD;  Location: [facility] ([address] [place] [address]);  Service: Endoscopy;  Laterality: N/A;    hemorrhiodectomy      tendon surgery L foot      TOTAL KNEE ARTHROPLASTY Right 2020    Procedure: ARTHROPLASTY, KNEE, TOTAL-DEPUY-SIGMA/DEPUY-TC3/MBT back up;  Surgeon: Zack Patton III, MD;  Location: [facility];   Service: Orthopedics;  Laterality: Right;       Family History   Problem Relation Age of Onset    Heart disease Mother 78        MI    Parkinsonism Father     No Known Problems Daughter     Melanoma Neg Hx     Psoriasis Neg Hx     Lupus Neg Hx     Eczema Neg Hx     Acne Neg Hx        Social History     Socioeconomic History    Marital status:     Number of children: 1   Occupational History    Occupation: jeweler   Tobacco Use    Smoking status: Never Smoker    Smokeless tobacco: Never Used   Substance and Sexual Activity    Alcohol use: Yes     Alcohol/week: 7.0 standard drinks     Types: 7 Glasses of wine per week     Comment: 5 oz of wine nightly    Drug use: No    Sexual activity: Yes     Partners: Male   Other Topics Concern    Are you pregnant or think you may be? No    Breast-feeding No   Social History Narrative    Biking, walking.

## 2022-06-17 RX ORDER — TRIAMCINOLONE ACETONIDE 40 MG/ML
40 INJECTION, SUSPENSION INTRA-ARTICULAR; INTRAMUSCULAR
Status: DISCONTINUED | OUTPATIENT
Start: 2022-06-07 | End: 2022-06-17 | Stop reason: HOSPADM

## 2022-06-17 NOTE — PROCEDURES
Large Joint Aspiration/Injection: L knee    Date/Time: 6/7/2022 1:00 PM  Performed by: Zack Patton III, MD  Authorized by: Zack Patton III, MD     Consent Done?:  Yes (Verbal)  Indications:  Pain  Timeout: prior to procedure the correct patient, procedure, and site was verified    Prep: patient was prepped and draped in usual sterile fashion      Local anesthesia used?: Yes    Local anesthetic:  Lidocaine 1% without epinephrine  Anesthetic total (ml):  5      Details:  Needle Size:  21 G  Location:  Knee  Site:  L knee  Medications:  40 mg triamcinolone acetonide 40 mg/mL  Patient tolerance:  Patient tolerated the procedure well with no immediate complications

## 2022-06-22 ENCOUNTER — PES CALL (OUTPATIENT)
Dept: ADMINISTRATIVE | Facility: CLINIC | Age: 77
End: 2022-06-22
Payer: MEDICARE

## 2022-07-08 NOTE — PROGRESS NOTES
Injection Information    Triamcinolone Acetonide Injectable Suspension (40mg/mL)  Lot Number: [id number]   Expiration Date: 1/31/2024

## 2022-07-18 ENCOUNTER — PATIENT MESSAGE (OUTPATIENT)
Dept: DERMATOLOGY | Facility: CLINIC | Age: 77
End: 2022-07-18
Payer: MEDICARE

## 2022-07-22 ENCOUNTER — OFFICE VISIT (OUTPATIENT)
Dept: DERMATOLOGY | Facility: CLINIC | Age: 77
End: 2022-07-22
Payer: MEDICARE

## 2022-07-22 DIAGNOSIS — L30.9 DERMATITIS: Primary | ICD-10-CM

## 2022-07-22 DIAGNOSIS — D69.2 SENILE PURPURA: ICD-10-CM

## 2022-07-22 PROCEDURE — 1159F MED LIST DOCD IN RCRD: CPT | Mod: CPTII,95,, | Performed by: DERMATOLOGY

## 2022-07-22 PROCEDURE — 99213 PR OFFICE/OUTPT VISIT, EST, LEVL III, 20-29 MIN: ICD-10-PCS | Mod: 95,,, | Performed by: DERMATOLOGY

## 2022-07-22 PROCEDURE — 1160F RVW MEDS BY RX/DR IN RCRD: CPT | Mod: CPTII,95,, | Performed by: DERMATOLOGY

## 2022-07-22 PROCEDURE — 99499 UNLISTED E&M SERVICE: CPT | Mod: 95,,, | Performed by: DERMATOLOGY

## 2022-07-22 PROCEDURE — 1160F PR REVIEW ALL MEDS BY PRESCRIBER/CLIN PHARMACIST DOCUMENTED: ICD-10-PCS | Mod: CPTII,95,, | Performed by: DERMATOLOGY

## 2022-07-22 PROCEDURE — 99213 OFFICE O/P EST LOW 20 MIN: CPT | Mod: 95,,, | Performed by: DERMATOLOGY

## 2022-07-22 PROCEDURE — 1159F PR MEDICATION LIST DOCUMENTED IN MEDICAL RECORD: ICD-10-PCS | Mod: CPTII,95,, | Performed by: DERMATOLOGY

## 2022-07-22 PROCEDURE — 99499 RISK ADDL DX/OHS AUDIT: ICD-10-PCS | Mod: 95,,, | Performed by: DERMATOLOGY

## 2022-07-22 NOTE — PROGRESS NOTES
Subjective:       Patient ID:  Aurora Castle is a 76 y.o. female who presents for No chief complaint on file.    History of Present Illness: The patient presents with chief complaint of rash .  Location: hands  Duration: 10 days  Signs/Symptoms: left hand was swollen/purlish sore     Prior treatments: hydrocortisone, working in yard wearing gloves      Review of Systems   Skin: Positive for rash. Negative for itching and sun sensitivity.   Hematologic/Lymphatic: Bruises/bleeds easily.        Objective:    Physical Exam   Constitutional: She appears well-developed and well-nourished. No distress.   Neurological: She is alert and oriented to person, place, and time. She is not disoriented.   Psychiatric: She has a normal mood and affect.   Skin:   Areas Examined (abnormalities noted in diagram):   Nails and Digits Inspection Performed             Diagram Legend     Erythematous scaling macule/papule c/w actinic keratosis       Vascular papule c/w angioma      Pigmented verrucoid papule/plaque c/w seborrheic keratosis      Yellow umbilicated papule c/w sebaceous hyperplasia      Irregularly shaped tan macule c/w lentigo     1-2 mm smooth white papules consistent with Milia      Movable subcutaneous cyst with punctum c/w epidermal inclusion cyst      Subcutaneous movable cyst c/w pilar cyst      Firm pink to brown papule c/w dermatofibroma      Pedunculated fleshy papule(s) c/w skin tag(s)      Evenly pigmented macule c/w junctional nevus     Mildly variegated pigmented, slightly irregular-bordered macule c/w mildly atypical nevus      Flesh colored to evenly pigmented papule c/w intradermal nevus       Pink pearly papule/plaque c/w basal cell carcinoma      Erythematous hyperkeratotic cursted plaque c/w SCC      Surgical scar with no sign of skin cancer recurrence      Open and closed comedones      Inflammatory papules and pustules      Verrucoid papule consistent consistent with wart     Erythematous eczematous  patches and plaques     Dystrophic onycholytic nail with subungual debris c/w onychomycosis     Umbilicated papule    Erythematous-base heme-crusted tan verrucoid plaque consistent with inflamed seborrheic keratosis     Erythematous Silvery Scaling Plaque c/w Psoriasis     See annotation                  Assessment / Plan:        Dermatitis  ? Fixed drug looks a little targetoid vs ? Em?? Not on palms only on posterior hands  - will prescribe topical steroid if needed at this time patient says its clearing  - possibly 2/2 intermittent use of alieve (NSAID), took  today will monitor if symptoms reoccur  - okay to take tylenol  - if reoccurs worse patient to message us and we will get in for biopsy  Senile purpura  - discussed etiology with skin/bv being fragile due to decreased collagen production   - start using am lactin daily or excipial cream for easy bruising or urea cream 40% to hands/arms nightly  - Start Excipial  For bruising daily dietary supplement, pic in AVS or 500 mg of vitamin C twice daily and at least 500 mg of rutin twice daily  - supplement can be found either online [employer] or drug stores      The patient location is: Home in [place]  The chief complaint leading to consultation is: rash  Visit type: audiovisual    Face to Face time with patient: 15  20 minutes of total time spent on the encounter, which includes face to face time and non-face to face time preparing to see the patient (eg, review of tests), Obtaining and/or reviewing separately obtained history, Documenting clinical information in the electronic or other health record, Independently interpreting results (not separately reported) and communicating results to the patient/family/caregiver, or Care coordination (not separately reported).         Each patient to whom he or she provides medical services by telemedicine is:  (1) informed of the relationship between the physician and patient and the respective role of any other health  care provider with respect to management of the patient; and (2) notified that he or she may decline to receive medical services by telemedicine and may withdraw from such care at any time.             No follow-ups on file.

## 2022-07-22 NOTE — PATIENT INSTRUCTIONS
Rash of hands  - possibly 2/2 intermittent use of alieve (NSAID), took  today will monitor if symptoms reoccur  - okay to take tylenol  - if reoccurs worse patient to message us and we will get in for biopsy  Senile purpura  - discussed etiology with skin/bv being fragile due to decreased collagen production   - start using am lactin daily or excipial cream for easy bruising or urea cream 40% to hands/arms nightly  - Start Excipial  For bruising daily dietary supplement, pic in AVS or 500 mg of vitamin C twice daily and at least 500 mg of rutin twice daily  - supplement can be found either online [employer] or drug stores

## 2022-07-26 ENCOUNTER — PATIENT OUTREACH (OUTPATIENT)
Dept: ADMINISTRATIVE | Facility: HOSPITAL | Age: 77
End: 2022-07-26
Payer: MEDICARE

## 2022-07-26 NOTE — PROGRESS NOTES
Health Maintenance Due   Topic Date Due    COVID-19 Vaccine (4 - Booster for Pfizer series) 02/02/2022    DEXA Scan  08/02/2022       HM updated. Immunizations reconciled.  PAD Screening scanned into chart.     Audrey Patterson LPN   Clinical Care Coordinator  [facility]ness

## 2022-09-19 ENCOUNTER — PATIENT MESSAGE (OUTPATIENT)
Dept: INTERNAL MEDICINE | Facility: CLINIC | Age: 77
End: 2022-09-19
Payer: MEDICARE

## 2022-09-19 ENCOUNTER — PATIENT MESSAGE (OUTPATIENT)
Dept: ORTHOPEDICS | Facility: CLINIC | Age: 77
End: 2022-09-19
Payer: MEDICARE

## 2022-09-19 DIAGNOSIS — Z12.31 ENCOUNTER FOR SCREENING MAMMOGRAM FOR BREAST CANCER: Primary | ICD-10-CM

## 2022-09-19 DIAGNOSIS — Z79.899 HIGH RISK MEDICATION USE: ICD-10-CM

## 2022-09-19 DIAGNOSIS — Z13.6 SCREENING FOR CARDIOVASCULAR CONDITION: ICD-10-CM

## 2022-09-19 DIAGNOSIS — D75.89 MACROCYTOSIS: ICD-10-CM

## 2022-09-19 DIAGNOSIS — R68.89 OTHER GENERAL SYMPTOMS AND SIGNS: ICD-10-CM

## 2022-09-19 NOTE — TELEPHONE ENCOUNTER
I called the patient to schedule her for left knee csi injections. The patient did not answer. I left a voicemail with a call back number.     Also messaged the patient via [facility]

## 2022-09-21 ENCOUNTER — PATIENT MESSAGE (OUTPATIENT)
Dept: DERMATOLOGY | Facility: CLINIC | Age: 77
End: 2022-09-21
Payer: MEDICARE

## 2022-09-21 NOTE — TELEPHONE ENCOUNTER
Annual gyne exam     HPI: 56 year old No obstetric history on file. presents for WWE with/out complaints of     Gyn History  No LMP recorded (lmp unknown).   But did have spotting in July which seems typical of her periods now.  Last Pap 2016 - WNL, Neg HPV  She is sexually active currently with 1 female partner.  History of STDs: Denies    OBHx:     Past Medical History:   Diagnosis Date   • Allergy    • Anxiety    • Arthritis    • Carpal tunnel syndrome, bilateral 8/3/2017   • Depression 2017   • GERD (gastroesophageal reflux disease)    • Heart murmur        Past Surgical History:   Procedure Laterality Date   • Ankle fracture surgery Left    • Foot surgery Left    • Foot surgery Left 2017    Had hardware removed       PSH: Bilateral carpal tunnel surgery      Family History   Problem Relation Age of Onset   • Heart disease Mother    • Cancer Mother    • Depression Mother    • Patient is unaware of any medical problems Father        FamHx: Denies breast/colon/GYN CA      Social History     Tobacco Use   • Smoking status: Former Smoker     Quit date: 2016     Years since quittin.0   • Smokeless tobacco: Never Used   Vaping Use   • Vaping Use: never used   Substance Use Topics   • Alcohol use: Not Currently   • Drug use: Never     SocHx:  Rare edible  Social EtOH    Current Outpatient Medications   Medication Sig Dispense Refill   • semaglutide,0.25 or 0.5 mg/DOSE, (OZEMPIC) (1.34 mg/ml) 0.25 or 0.5 MG/DOSE injection Inject 0.5 mg into the skin every 7 days. 4.5 mL 3   • fluticasone (FLONASE) 50 MCG/ACT nasal spray SHAKE LQ AND U 1 SPR IEN QD  12   • ibuprofen (MOTRIN) 600 MG tablet Take 600 mg by mouth.     • calcium citrate-vitamin D 200-250 MG-UNIT tablet      • Cetirizine HCl 10 MG Cap        No current facility-administered medications for this visit.       ALLERGIES:  Cat dander, Latex, Latex   (environmental), and Pollen      ROS:  Review of Systems   Constitutional: Negative.    HENT:  I called the patient today regarding her surgery scheduled with Dr. Patton on 07/15/2020. I informed the patient that her surgery has been postponed due to the guidelines set by the [facility] and the [facility]. I informed the patient that her surgery is postponed until further notice. I also notified the patient that their joint class, pre-op and post-op appointments will be cancelled and will be rescheduled at a later date. The patient verbalized understanding. The patient had no further questions at this time.      Negative.    Eyes: Negative.    Respiratory: Negative.    Cardiovascular: Negative.    Gastrointestinal: Negative.    Endocrine: Negative.    Genitourinary: Negative.    Musculoskeletal: Negative.    Skin: Negative.    Allergic/Immunologic: Negative.    Neurological: Negative.    Hematological: Negative.    Psychiatric/Behavioral: Negative.          Physical Exam  Visit Vitals  BP (!) 148/88 (BP Location: LUE - Left upper extremity, Patient Position: Sitting, Cuff Size: Regular)   Pulse 74   Temp 99.7 °F (37.6 °C) (Tympanic)   Wt 119.9 kg (264 lb 5.3 oz)   LMP  (LMP Unknown)   BMI 43.99 kg/m²     Physical Exam  Vitals reviewed.   Constitutional:       Appearance: Normal appearance.   Cardiovascular:      Rate and Rhythm: Normal rate.   Pulmonary:      Effort: Pulmonary effort is normal.   Abdominal:      Palpations: Abdomen is soft.      Tenderness: There is no abdominal tenderness.   Genitourinary:     General: Normal vulva.      Vagina: Normal.      Cervix: Normal.      Uterus: Normal.       Adnexa: Right adnexa normal and left adnexa normal.   Musculoskeletal:         General: No swelling or tenderness.   Skin:     General: Skin is warm and dry.   Neurological:      Mental Status: She is alert and oriented to person, place, and time.   Psychiatric:         Mood and Affect: Mood normal.       Breast exam: No lumps, masses or tenderness    ASSESSMENT/PLAN: Routine Gyne Exam     56 year old No obstetric history on file. presents for WWE. Doing well.    -Pap smear:   Last Pap 2016 - WNL ; Neg HPV  Pap completed today     -Breast:   Exam today WNL - provided reassurance.   RTC if breast abnormalities noted.  Last mammo: 2019 - WNL  Completed mammogram today     -STI:   Prevention precautions discussed  declined testing.          General Vaccination:  - Recommended Flu shot - pt amenable - order placed  - Encouraged updated bivalent COVID19 booster  - S/p Shingles vaccine    Colon cancer screening:  Colonoscopy,  Sigmoidoscopy or annual ColoGuard with PCP.  Mailed stool sample - waiting results     Patient verbalised understanding  All her questions were answered and concerns addressed.      Return in about 1 year (around 9/21/2023).    Sydnie Gilbert MD

## 2022-09-28 ENCOUNTER — OFFICE VISIT (OUTPATIENT)
Dept: DERMATOLOGY | Facility: CLINIC | Age: 77
End: 2022-09-28
Payer: MEDICARE

## 2022-09-28 DIAGNOSIS — L82.0 INFLAMED SEBORRHEIC KERATOSIS: Primary | ICD-10-CM

## 2022-09-28 DIAGNOSIS — Z78.0 MENOPAUSE: ICD-10-CM

## 2022-09-28 PROCEDURE — 1159F MED LIST DOCD IN RCRD: CPT | Mod: CPTII,95,, | Performed by: STUDENT IN AN ORGANIZED HEALTH CARE EDUCATION/TRAINING PROGRAM

## 2022-09-28 PROCEDURE — 1160F RVW MEDS BY RX/DR IN RCRD: CPT | Mod: CPTII,95,, | Performed by: STUDENT IN AN ORGANIZED HEALTH CARE EDUCATION/TRAINING PROGRAM

## 2022-09-28 PROCEDURE — 99213 PR OFFICE/OUTPT VISIT, EST, LEVL III, 20-29 MIN: ICD-10-PCS | Mod: 95,,, | Performed by: STUDENT IN AN ORGANIZED HEALTH CARE EDUCATION/TRAINING PROGRAM

## 2022-09-28 PROCEDURE — 1160F PR REVIEW ALL MEDS BY PRESCRIBER/CLIN PHARMACIST DOCUMENTED: ICD-10-PCS | Mod: CPTII,95,, | Performed by: STUDENT IN AN ORGANIZED HEALTH CARE EDUCATION/TRAINING PROGRAM

## 2022-09-28 PROCEDURE — 1159F PR MEDICATION LIST DOCUMENTED IN MEDICAL RECORD: ICD-10-PCS | Mod: CPTII,95,, | Performed by: STUDENT IN AN ORGANIZED HEALTH CARE EDUCATION/TRAINING PROGRAM

## 2022-09-28 PROCEDURE — 99213 OFFICE O/P EST LOW 20 MIN: CPT | Mod: 95,,, | Performed by: STUDENT IN AN ORGANIZED HEALTH CARE EDUCATION/TRAINING PROGRAM

## 2022-09-28 NOTE — PROGRESS NOTES
Patient Information  Name: Aurora Castle  : 1945  MRN: [id number]     Referring Physician:  Dr. Umana ref. provider found   Primary Care Physician:  Dr. Isabelle Vincent MD   Date of Visit: 2022      Subjective:       Aurora Castle is a 77 y.o. female who presents for skin lesion    HPI  The patient location is: [place]  The chief complaint leading to consultation is: skin lesion    Visit type: audiovisual    Face to Face time with patient: 8 min  10 minutes of total time spent on the encounter, which includes face to face time and non-face to face time preparing to see the patient (eg, review of tests), Obtaining and/or reviewing separately obtained history, Documenting clinical information in the electronic or other health record, Independently interpreting results (not separately reported) and communicating results to the patient/family/caregiver, or Care coordination (not separately reported).     Each patient to whom he or she provides medical services by telemedicine is:  (1) informed of the relationship between the physician and patient and the respective role of any other health care provider with respect to management of the patient; and (2) notified that he or she may decline to receive medical services by telemedicine and may withdraw from such care at any time.    Notes:   Patient with new complaint of lesion(s)  Location: left shoulder  Duration: years  Symptoms: puffed up, swelling  Relieving factors/Previous treatments: none    Patient was last seen:Visit date not found     Prior notes by myself reviewed.   Clinical documentation obtained by nursing staff reviewed.    Review of Systems   Skin:  Negative for itching and rash.      Objective:    Physical Exam   Constitutional: She appears well-developed and well-nourished. No distress.   Neurological: She is alert and oriented to person, place, and time. She is not disoriented.   Psychiatric: She has a normal mood and affect.   Skin:    Areas Examined (abnormalities noted in diagram):   Chest / Axilla Inspection Performed            Diagram Legend     Erythematous scaling macule/papule c/w actinic keratosis       Vascular papule c/w angioma      Pigmented verrucoid papule/plaque c/w seborrheic keratosis      Yellow umbilicated papule c/w sebaceous hyperplasia      Irregularly shaped tan macule c/w lentigo     1-2 mm smooth white papules consistent with Milia      Movable subcutaneous cyst with punctum c/w epidermal inclusion cyst      Subcutaneous movable cyst c/w pilar cyst      Firm pink to brown papule c/w dermatofibroma      Pedunculated fleshy papule(s) c/w skin tag(s)      Evenly pigmented macule c/w junctional nevus     Mildly variegated pigmented, slightly irregular-bordered macule c/w mildly atypical nevus      Flesh colored to evenly pigmented papule c/w intradermal nevus       Pink pearly papule/plaque c/w basal cell carcinoma      Erythematous hyperkeratotic cursted plaque c/w SCC      Surgical scar with no sign of skin cancer recurrence      Open and closed comedones      Inflammatory papules and pustules      Verrucoid papule consistent consistent with wart     Erythematous eczematous patches and plaques     Dystrophic onycholytic nail with subungual debris c/w onychomycosis     Umbilicated papule    Erythematous-base heme-crusted tan verrucoid plaque consistent with inflamed seborrheic keratosis     Erythematous Silvery Scaling Plaque c/w Psoriasis     See annotation          [] Data reviewed  [] Independent review of test  [] Management discussed with another provider    Assessment / Plan:        Inflamed seborrheic keratosis  These are benign inherited growths without a malignant potential. Reassurance given to patient. Offered LN2 however pt lives in [place] and unable to drive to [place].           LOS NUMBER AND COMPLEXITY OF PROBLEMS    COMPLEXITY OF DATA RISK TOTAL TIME (m)   22195  17971 [] 1 self-limited or minor  problem [x] Minimal to none [] No treatment recommended or patient to monitor 15-29  10-19   15466  22448 Low  [] 2 or > self limited or minor problems  [] 1 stable chronic illness  [x] 1 acute, uncomplicated illness or injury Limited (2)  [] Prior external notes from each unique source  [] Review result of each unique test  [] Order each unique test [x]  Low  OTC medications, minor skin biopsy 30-44  20-29   40560  67911 Moderate  []  1 or > chronic illness with progression, exacerbation or SE of treatment  []  2 or more stable chronic illnesses  []  1 acute illness with systemic symptoms  []  1 acute complicated injury  []  1 undiagnosed new problem with uncertain prognosis Moderate (1/3 below)  []  3 or more data items        *Now includes assessment requiring independent historian  []  Independent interpretation of a test  []  Discuss management/test with another provider Moderate  []  Prescription drug mgmt  []  Minor surgery with risk discussed  []  Mgmt limited by social determinates 45-59  30-39   82632  29983 High  []  1 or more chronic illness with severe exacerbation, progression or SE of treatment  []  1 acute or chronic illness/injury that poses a threat to life or bodily function Extensive (2/3 below)  []  3 or more data items        *Now includes assessment requiring independent historian.  []  Independent interpretation of a test  []  Discuss management/test with another provider High  []  Major surgery with risk discussed  []  Drug therapy requiring intensive monitoring for toxicity  []  Hospitalization  []  Decision for DNR 60-74  40-54      No follow-ups on file.    Jonna Jimenez MD, FAAD  [facility]

## 2022-09-29 ENCOUNTER — OFFICE VISIT (OUTPATIENT)
Dept: ORTHOPEDICS | Facility: CLINIC | Age: 77
End: 2022-09-29
Payer: MEDICARE

## 2022-09-29 VITALS — BODY MASS INDEX: 24.94 KG/M2 | WEIGHT: 174.19 LBS | HEIGHT: 70 IN

## 2022-09-29 DIAGNOSIS — M17.12 PRIMARY OSTEOARTHRITIS OF LEFT KNEE: Primary | ICD-10-CM

## 2022-09-29 PROCEDURE — 1101F PT FALLS ASSESS-DOCD LE1/YR: CPT | Mod: CPTII,S$GLB,, | Performed by: PHYSICIAN ASSISTANT

## 2022-09-29 PROCEDURE — 1125F PR PAIN SEVERITY QUANTIFIED, PAIN PRESENT: ICD-10-PCS | Mod: CPTII,S$GLB,, | Performed by: PHYSICIAN ASSISTANT

## 2022-09-29 PROCEDURE — 99212 PR OFFICE/OUTPT VISIT, EST, LEVL II, 10-19 MIN: ICD-10-PCS | Mod: 25,S$GLB,, | Performed by: PHYSICIAN ASSISTANT

## 2022-09-29 PROCEDURE — 1101F PR PT FALLS ASSESS DOC 0-1 FALLS W/OUT INJ PAST YR: ICD-10-PCS | Mod: CPTII,S$GLB,, | Performed by: PHYSICIAN ASSISTANT

## 2022-09-29 PROCEDURE — 1159F MED LIST DOCD IN RCRD: CPT | Mod: CPTII,S$GLB,, | Performed by: PHYSICIAN ASSISTANT

## 2022-09-29 PROCEDURE — 1159F PR MEDICATION LIST DOCUMENTED IN MEDICAL RECORD: ICD-10-PCS | Mod: CPTII,S$GLB,, | Performed by: PHYSICIAN ASSISTANT

## 2022-09-29 PROCEDURE — 20610 DRAIN/INJ JOINT/BURSA W/O US: CPT | Mod: LT,S$GLB,, | Performed by: PHYSICIAN ASSISTANT

## 2022-09-29 PROCEDURE — 99212 OFFICE O/P EST SF 10 MIN: CPT | Mod: 25,S$GLB,, | Performed by: PHYSICIAN ASSISTANT

## 2022-09-29 PROCEDURE — 99999 PR PBB SHADOW E&M-EST. PATIENT-LVL III: CPT | Mod: PBBFAC,,, | Performed by: PHYSICIAN ASSISTANT

## 2022-09-29 PROCEDURE — 20610 PR DRAIN/INJECT LARGE JOINT/BURSA: ICD-10-PCS | Mod: LT,S$GLB,, | Performed by: PHYSICIAN ASSISTANT

## 2022-09-29 PROCEDURE — 3288F PR FALLS RISK ASSESSMENT DOCUMENTED: ICD-10-PCS | Mod: CPTII,S$GLB,, | Performed by: PHYSICIAN ASSISTANT

## 2022-09-29 PROCEDURE — 99999 PR PBB SHADOW E&M-EST. PATIENT-LVL III: ICD-10-PCS | Mod: PBBFAC,,, | Performed by: PHYSICIAN ASSISTANT

## 2022-09-29 PROCEDURE — 1125F AMNT PAIN NOTED PAIN PRSNT: CPT | Mod: CPTII,S$GLB,, | Performed by: PHYSICIAN ASSISTANT

## 2022-09-29 PROCEDURE — 3288F FALL RISK ASSESSMENT DOCD: CPT | Mod: CPTII,S$GLB,, | Performed by: PHYSICIAN ASSISTANT

## 2022-09-29 RX ORDER — TRIAMCINOLONE ACETONIDE 40 MG/ML
40 INJECTION, SUSPENSION INTRA-ARTICULAR; INTRAMUSCULAR
Status: COMPLETED | OUTPATIENT
Start: 2022-09-29 | End: 2022-09-29

## 2022-09-29 RX ADMIN — TRIAMCINOLONE ACETONIDE 40 MG: 40 INJECTION, SUSPENSION INTRA-ARTICULAR; INTRAMUSCULAR at 02:09

## 2022-09-29 NOTE — PROGRESS NOTES
"  SUBJECTIVE:     Chief Complaint : left knee pain    History of Present Illness:  Aurora Castle is a 77 y.o. female seen in clinic today with a chief complaint of left knee pain. Had CSI by Dr. Patton 6/2022 with relief x 2 months. Symptoms have returned and she would like to repeat CSI. No trauma or change in nature of pain since last visit. She is s/p R TKA 2020 by Dr. Patton.      Past Medical History:   Diagnosis Date    Arthritis     History of adenomatous polyp of colon     Joint pain     Pneumonia     during college     Tendonitis of ankle or foot     Trouble in sleeping     Urinary incontinence      Review of Systems:  Constitutional: no fever or chills  ENT: no nasal congestion or sore throat  Respiratory: no cough or shortness of breath  Cardiovascular: no chest pain or palpitations    OBJECTIVE:     PHYSICAL EXAM:  Height 5' 10" (1.778 m), weight 79 kg (174 lb 3.2 oz).   General Appearance: WDWN, NAD  Gait: Normal  Neuro/Psych: Mood & affect appropriate  Lungs: Respirations equal and unlabored.   CV: 2+ bilateral upper and lower extremity pulses.   Skin: Intact throughout LE  Extremities: No LE edema    Left Knee Exam  Range of Motion:5-115 active   Effusion:none  Condition of skin:intact  Location of tenderness:Lateral joint line   Strength:5 of 5 quadriceps strength and 5 of 5 hamstring strength  Stability:stable to testing  Valgus     RADIOGRAPHS: AP, lateral and merchant knee x-ray images reviewed today by me reveal advanced degenerative changes with loss of lateral joint space . Kellgren-Dimitry grade 4.    ASSESSMENT/PLAN:   Primary osteoarthritis left knee  Valgus deformity  - CSI today. Pt may follow up with me for injections and will fu with Dr. Patton when ready for tka.    Knee Injection Procedure Note  Diagnosis: left knee degenerative arthritis  Indications: left knee pain  Procedure Details: Verbal consent was obtained for the procedure. The injection site was identified and the skin was " prepared with alcohol. The left knee was injected from an anterolateral approach with 1 ml of Kenalog and 2 ml Lidocaine under sterile technique using a 22 gauge needle. The needle was removed and the area cleansed and dressed.  Complications:  Patient tolerated the procedure well.    she was advised to rest the knee today, using ice and elevation as needed for comfort and swelling.Immediate relief of the knee pain may be short lived and secondary to the lidocaine. she may have an increase in discomfort tonight followed by steady improvement over the next several days. It may take 1-2 weeks following the injection to get the full benefit of the medication.

## 2022-10-27 ENCOUNTER — HOSPITAL ENCOUNTER (OUTPATIENT)
Dept: RADIOLOGY | Facility: HOSPITAL | Age: 77
Discharge: HOME | End: 2022-10-27
Attending: INTERNAL MEDICINE
Payer: MEDICARE

## 2022-10-27 DIAGNOSIS — Z12.31 ENCOUNTER FOR SCREENING MAMMOGRAM FOR BREAST CANCER: ICD-10-CM

## 2022-10-27 PROCEDURE — 77067 MAMMO DIGITAL SCREENING BILAT WITH TOMO: ICD-10-PCS | Mod: 26,,, | Performed by: RADIOLOGY

## 2022-10-27 PROCEDURE — 77063 MAMMO DIGITAL SCREENING BILAT WITH TOMO: ICD-10-PCS | Mod: 26,,, | Performed by: RADIOLOGY

## 2022-10-27 PROCEDURE — 77063 BREAST TOMOSYNTHESIS BI: CPT | Mod: 26,,, | Performed by: RADIOLOGY

## 2022-10-27 PROCEDURE — 77067 SCR MAMMO BI INCL CAD: CPT | Mod: TC

## 2022-10-27 PROCEDURE — 77067 SCR MAMMO BI INCL CAD: CPT | Mod: 26,,, | Performed by: RADIOLOGY

## 2022-10-27 PROCEDURE — 77063 BREAST TOMOSYNTHESIS BI: CPT | Mod: TC

## 2022-11-16 ENCOUNTER — HOSPITAL ENCOUNTER (OUTPATIENT)
Dept: RADIOLOGY | Facility: CLINIC | Age: 77
Discharge: HOME | End: 2022-11-16
Attending: INTERNAL MEDICINE
Payer: MEDICARE

## 2022-11-16 DIAGNOSIS — Z78.0 MENOPAUSE: ICD-10-CM

## 2022-11-16 PROCEDURE — 77080 DEXA BONE DENSITY SPINE HIP: ICD-10-PCS | Mod: 26,,, | Performed by: INTERNAL MEDICINE

## 2022-11-16 PROCEDURE — 77080 DXA BONE DENSITY AXIAL: CPT | Mod: 26,,, | Performed by: INTERNAL MEDICINE

## 2022-11-16 PROCEDURE — 77080 DXA BONE DENSITY AXIAL: CPT | Mod: TC

## 2022-11-28 ENCOUNTER — LAB VISIT (OUTPATIENT)
Dept: LAB | Facility: HOSPITAL | Age: 77
End: 2022-11-28
Attending: INTERNAL MEDICINE
Payer: MEDICARE

## 2022-11-28 DIAGNOSIS — Z13.6 SCREENING FOR CARDIOVASCULAR CONDITION: ICD-10-CM

## 2022-11-28 DIAGNOSIS — R68.89 OTHER GENERAL SYMPTOMS AND SIGNS: ICD-10-CM

## 2022-11-28 DIAGNOSIS — Z79.899 HIGH RISK MEDICATION USE: ICD-10-CM

## 2022-11-28 DIAGNOSIS — D75.89 MACROCYTOSIS: ICD-10-CM

## 2022-11-28 LAB
ALBUMIN SERPL BCP-MCNC: 3.8 G/DL (ref 3.5–5.2)
ALP SERPL-CCNC: 61 U/L (ref 55–135)
ALT SERPL W/O P-5'-P-CCNC: 18 U/L (ref 10–44)
ANION GAP SERPL CALC-SCNC: 9 MMOL/L (ref 8–16)
AST SERPL-CCNC: 22 U/L (ref 10–40)
BASOPHILS # BLD AUTO: 0.05 K/UL (ref 0–0.2)
BASOPHILS NFR BLD: 1.1 % (ref 0–1.9)
BILIRUB SERPL-MCNC: 0.4 MG/DL (ref 0.1–1)
BUN SERPL-MCNC: 25 MG/DL (ref 8–23)
CALCIUM SERPL-MCNC: 9.2 MG/DL (ref 8.7–10.5)
CHLORIDE SERPL-SCNC: 108 MMOL/L (ref 95–110)
CHOLEST SERPL-MCNC: 140 MG/DL (ref 120–199)
CHOLEST/HDLC SERPL: 2.2 {RATIO} (ref 2–5)
CO2 SERPL-SCNC: 25 MMOL/L (ref 23–29)
CREAT SERPL-MCNC: 0.6 MG/DL (ref 0.5–1.4)
DIFFERENTIAL METHOD: ABNORMAL
EOSINOPHIL # BLD AUTO: 0.2 K/UL (ref 0–0.5)
EOSINOPHIL NFR BLD: 4.9 % (ref 0–8)
ERYTHROCYTE [DISTWIDTH] IN BLOOD BY AUTOMATED COUNT: 13.9 % (ref 11.5–14.5)
EST. GFR  (NO RACE VARIABLE): >60 ML/MIN/1.73 M^2
GLUCOSE SERPL-MCNC: 91 MG/DL (ref 70–110)
HCT VFR BLD AUTO: 39.8 % (ref 37–48.5)
HDLC SERPL-MCNC: 64 MG/DL (ref 40–75)
HDLC SERPL: 45.7 % (ref 20–50)
HGB BLD-MCNC: 13 G/DL (ref 12–16)
IMM GRANULOCYTES # BLD AUTO: 0.01 K/UL (ref 0–0.04)
IMM GRANULOCYTES NFR BLD AUTO: 0.2 % (ref 0–0.5)
LDLC SERPL CALC-MCNC: 68.6 MG/DL (ref 63–159)
LYMPHOCYTES # BLD AUTO: 1 K/UL (ref 1–4.8)
LYMPHOCYTES NFR BLD: 21.7 % (ref 18–48)
MCH RBC QN AUTO: 33.9 PG (ref 27–31)
MCHC RBC AUTO-ENTMCNC: 32.7 G/DL (ref 32–36)
MCV RBC AUTO: 104 FL (ref 82–98)
MONOCYTES # BLD AUTO: 0.4 K/UL (ref 0.3–1)
MONOCYTES NFR BLD: 8.1 % (ref 4–15)
NEUTROPHILS # BLD AUTO: 2.9 K/UL (ref 1.8–7.7)
NEUTROPHILS NFR BLD: 64 % (ref 38–73)
NONHDLC SERPL-MCNC: 76 MG/DL
NRBC BLD-RTO: 0 /100 WBC
PLATELET # BLD AUTO: 207 K/UL (ref 150–450)
PMV BLD AUTO: 11 FL (ref 9.2–12.9)
POTASSIUM SERPL-SCNC: 5.4 MMOL/L (ref 3.5–5.1)
PROT SERPL-MCNC: 6.4 G/DL (ref 6–8.4)
RBC # BLD AUTO: 3.83 M/UL (ref 4–5.4)
SODIUM SERPL-SCNC: 142 MMOL/L (ref 136–145)
TRIGL SERPL-MCNC: 37 MG/DL (ref 30–150)
TSH SERPL DL<=0.005 MIU/L-ACNC: 2.24 UIU/ML (ref 0.4–4)
WBC # BLD AUTO: 4.47 K/UL (ref 3.9–12.7)

## 2022-11-28 PROCEDURE — 85025 COMPLETE CBC W/AUTO DIFF WBC: CPT | Performed by: INTERNAL MEDICINE

## 2022-11-28 PROCEDURE — 80061 LIPID PANEL: CPT | Performed by: INTERNAL MEDICINE

## 2022-11-28 PROCEDURE — 36415 COLL VENOUS BLD VENIPUNCTURE: CPT | Performed by: INTERNAL MEDICINE

## 2022-11-28 PROCEDURE — 80053 COMPREHEN METABOLIC PANEL: CPT | Performed by: INTERNAL MEDICINE

## 2022-11-28 PROCEDURE — 84443 ASSAY THYROID STIM HORMONE: CPT | Performed by: INTERNAL MEDICINE

## 2022-12-05 ENCOUNTER — OFFICE VISIT (OUTPATIENT)
Dept: INTERNAL MEDICINE | Facility: CLINIC | Age: 77
End: 2022-12-05
Payer: MEDICARE

## 2022-12-05 VITALS
HEIGHT: 70 IN | WEIGHT: 171.5 LBS | DIASTOLIC BLOOD PRESSURE: 76 MMHG | HEART RATE: 59 BPM | SYSTOLIC BLOOD PRESSURE: 138 MMHG | BODY MASS INDEX: 24.55 KG/M2 | OXYGEN SATURATION: 99 %

## 2022-12-05 DIAGNOSIS — D75.89 MACROCYTOSIS: ICD-10-CM

## 2022-12-05 DIAGNOSIS — Z00.00 ANNUAL PHYSICAL EXAM: Primary | ICD-10-CM

## 2022-12-05 DIAGNOSIS — G47.00 INSOMNIA, UNSPECIFIED TYPE: ICD-10-CM

## 2022-12-05 DIAGNOSIS — N81.4 UTEROVAGINAL PROLAPSE: ICD-10-CM

## 2022-12-05 DIAGNOSIS — D69.2 SENILE PURPURA: ICD-10-CM

## 2022-12-05 PROCEDURE — 99397 PER PM REEVAL EST PAT 65+ YR: CPT | Mod: S$GLB,,, | Performed by: INTERNAL MEDICINE

## 2022-12-05 PROCEDURE — 3075F SYST BP GE 130 - 139MM HG: CPT | Mod: CPTII,S$GLB,, | Performed by: INTERNAL MEDICINE

## 2022-12-05 PROCEDURE — 99397 PR PREVENTIVE VISIT,EST,65 & OVER: ICD-10-PCS | Mod: S$GLB,,, | Performed by: INTERNAL MEDICINE

## 2022-12-05 PROCEDURE — 1126F AMNT PAIN NOTED NONE PRSNT: CPT | Mod: CPTII,S$GLB,, | Performed by: INTERNAL MEDICINE

## 2022-12-05 PROCEDURE — 3288F PR FALLS RISK ASSESSMENT DOCUMENTED: ICD-10-PCS | Mod: CPTII,S$GLB,, | Performed by: INTERNAL MEDICINE

## 2022-12-05 PROCEDURE — 1126F PR PAIN SEVERITY QUANTIFIED, NO PAIN PRESENT: ICD-10-PCS | Mod: CPTII,S$GLB,, | Performed by: INTERNAL MEDICINE

## 2022-12-05 PROCEDURE — 1159F PR MEDICATION LIST DOCUMENTED IN MEDICAL RECORD: ICD-10-PCS | Mod: CPTII,S$GLB,, | Performed by: INTERNAL MEDICINE

## 2022-12-05 PROCEDURE — 3078F PR MOST RECENT DIASTOLIC BLOOD PRESSURE < 80 MM HG: ICD-10-PCS | Mod: CPTII,S$GLB,, | Performed by: INTERNAL MEDICINE

## 2022-12-05 PROCEDURE — 99999 PR PBB SHADOW E&M-EST. PATIENT-LVL III: ICD-10-PCS | Mod: PBBFAC,,, | Performed by: INTERNAL MEDICINE

## 2022-12-05 PROCEDURE — 99999 PR PBB SHADOW E&M-EST. PATIENT-LVL III: CPT | Mod: PBBFAC,,, | Performed by: INTERNAL MEDICINE

## 2022-12-05 PROCEDURE — 1101F PR PT FALLS ASSESS DOC 0-1 FALLS W/OUT INJ PAST YR: ICD-10-PCS | Mod: CPTII,S$GLB,, | Performed by: INTERNAL MEDICINE

## 2022-12-05 PROCEDURE — 1160F PR REVIEW ALL MEDS BY PRESCRIBER/CLIN PHARMACIST DOCUMENTED: ICD-10-PCS | Mod: CPTII,S$GLB,, | Performed by: INTERNAL MEDICINE

## 2022-12-05 PROCEDURE — 1160F RVW MEDS BY RX/DR IN RCRD: CPT | Mod: CPTII,S$GLB,, | Performed by: INTERNAL MEDICINE

## 2022-12-05 PROCEDURE — 3075F PR MOST RECENT SYSTOLIC BLOOD PRESS GE 130-139MM HG: ICD-10-PCS | Mod: CPTII,S$GLB,, | Performed by: INTERNAL MEDICINE

## 2022-12-05 PROCEDURE — 3288F FALL RISK ASSESSMENT DOCD: CPT | Mod: CPTII,S$GLB,, | Performed by: INTERNAL MEDICINE

## 2022-12-05 PROCEDURE — 1159F MED LIST DOCD IN RCRD: CPT | Mod: CPTII,S$GLB,, | Performed by: INTERNAL MEDICINE

## 2022-12-05 PROCEDURE — 1101F PT FALLS ASSESS-DOCD LE1/YR: CPT | Mod: CPTII,S$GLB,, | Performed by: INTERNAL MEDICINE

## 2022-12-05 PROCEDURE — 3078F DIAST BP <80 MM HG: CPT | Mod: CPTII,S$GLB,, | Performed by: INTERNAL MEDICINE

## 2022-12-05 NOTE — PROGRESS NOTES
Subjective:       Patient ID: Aurora Castle is a 77 y.o. female.    Chief Complaint: Annual Exam    HPI  Purpura of arms.  Related to sun, and minor trauma.    Senile purpura.  She is trying vit c and rutin, lutin cream.    Recurrent shingles ? LUE, alleviated by valtrex.         She is taking aleve 2 tabs daily.    Pelvic relaxation, tx with pessary.  Postponing surgery as long as possible.    Happy with TKR on R.  L painful, but not yet ready for surgery.    Chronic insomnia, tx with half unisom.    Lamisil has helped onychomycosis of thumb and feet.    Review of Systems   Constitutional:  Negative for fever and unexpected weight change.   HENT:  Negative for nasal congestion and postnasal drip.    Respiratory:  Negative for chest tightness, shortness of breath and wheezing.    Cardiovascular:  Negative for chest pain and leg swelling.   Gastrointestinal:  Negative for abdominal pain, anal bleeding, constipation, diarrhea, nausea and vomiting.   Genitourinary:  Negative for dysuria and urgency.   Integumentary:  Negative for rash.   Neurological:  Negative for headaches.   Psychiatric/Behavioral:  Negative for dysphoric mood and sleep disturbance. The patient is not nervous/anxious.        Objective:      Physical Exam  Constitutional:       General: She is not in acute distress.     Appearance: She is well-developed.   HENT:      Head: Normocephalic and atraumatic.      Right Ear: External ear normal.      Left Ear: External ear normal.      Nose: Nose normal.   Eyes:      General: No scleral icterus.        Right eye: No discharge.         Left eye: No discharge.      Conjunctiva/sclera: Conjunctivae normal.      Pupils: Pupils are equal, round, and reactive to light.   Neck:      Thyroid: No thyromegaly.      Vascular: No JVD.   Cardiovascular:      Rate and Rhythm: Normal rate and regular rhythm.      Heart sounds: Normal heart sounds. No murmur heard.    No gallop.   Pulmonary:      Effort: Pulmonary  effort is normal. No respiratory distress.      Breath sounds: Normal breath sounds. No wheezing or rales.   Chest:   Breasts:     Right: Normal.      Left: Normal.   Abdominal:      General: Bowel sounds are normal. There is no distension.      Palpations: Abdomen is soft. There is no mass.      Tenderness: There is no abdominal tenderness. There is no guarding or rebound.   Musculoskeletal:         General: No tenderness. Normal range of motion.      Cervical back: Normal range of motion and neck supple.   Lymphadenopathy:      Cervical: No cervical adenopathy.   Skin:     General: Skin is warm and dry.      Findings: No rash.      Comments: Small area of purpura L forearm   Neurological:      Mental Status: She is alert and oriented to person, place, and time.      Cranial Nerves: No cranial nerve deficit.      Coordination: Coordination normal.   Psychiatric:         Behavior: Behavior normal.         Thought Content: Thought content normal.         Judgment: Judgment normal.       Results for orders placed or performed in visit on 11/28/22   CBC Auto Differential   Result Value Ref Range    WBC 4.47 3.90 - 12.70 K/uL    RBC 3.83 (L) 4.00 - 5.40 M/uL    Hemoglobin 13.0 12.0 - 16.0 g/dL    Hematocrit 39.8 37.0 - 48.5 %     (H) 82 - 98 fL    MCH 33.9 (H) 27.0 - 31.0 pg    MCHC 32.7 32.0 - 36.0 g/dL    RDW 13.9 11.5 - 14.5 %    Platelets 207 150 - 450 K/uL    MPV 11.0 9.2 - 12.9 fL    Immature Granulocytes 0.2 0.0 - 0.5 %    Gran # (ANC) 2.9 1.8 - 7.7 K/uL    Immature Grans (Abs) 0.01 0.00 - 0.04 K/uL    Lymph # 1.0 1.0 - 4.8 K/uL    Mono # 0.4 0.3 - 1.0 K/uL    Eos # 0.2 0.0 - 0.5 K/uL    Baso # 0.05 0.00 - 0.20 K/uL    nRBC 0 0 /100 WBC    Gran % 64.0 38.0 - 73.0 %    Lymph % 21.7 18.0 - 48.0 %    Mono % 8.1 4.0 - 15.0 %    Eosinophil % 4.9 0.0 - 8.0 %    Basophil % 1.1 0.0 - 1.9 %    Differential Method Automated    Comprehensive Metabolic Panel   Result Value Ref Range    Sodium 142 136 - 145 mmol/L     Potassium 5.4 (H) 3.5 - 5.1 mmol/L    Chloride 108 95 - 110 mmol/L    CO2 25 23 - 29 mmol/L    Glucose 91 70 - 110 mg/dL    BUN 25 (H) 8 - 23 mg/dL    Creatinine 0.6 0.5 - 1.4 mg/dL    Calcium 9.2 8.7 - 10.5 mg/dL    Total Protein 6.4 6.0 - 8.4 g/dL    Albumin 3.8 3.5 - 5.2 g/dL    Total Bilirubin 0.4 0.1 - 1.0 mg/dL    Alkaline Phosphatase 61 55 - 135 U/L    AST 22 10 - 40 U/L    ALT 18 10 - 44 U/L    Anion Gap 9 8 - 16 mmol/L    eGFR >60.0 >60 mL/min/1.73 m^2   Lipid Panel   Result Value Ref Range    Cholesterol 140 120 - 199 mg/dL    Triglycerides 37 30 - 150 mg/dL    HDL 64 40 - 75 mg/dL    LDL Cholesterol 68.6 63.0 - 159.0 mg/dL    HDL/Cholesterol Ratio 45.7 20.0 - 50.0 %    Total Cholesterol/HDL Ratio 2.2 2.0 - 5.0    Non-HDL Cholesterol 76 mg/dL   TSH   Result Value Ref Range    TSH 2.241 0.400 - 4.000 uIU/mL      Assessment:       Problem List Items Addressed This Visit       Uterovaginal prolapse    Senile purpura    Macrocytosis    Insomnia     Other Visit Diagnoses       Annual physical exam    -  Primary            Plan:       Aurora was seen today for annual exam.    Diagnoses and all orders for this visit:    Annual physical exam    Macrocytosis    Uterovaginal prolapse    Insomnia, unspecified type    Senile purpura

## 2022-12-21 ENCOUNTER — TELEPHONE (OUTPATIENT)
Dept: ORTHOPEDICS | Facility: CLINIC | Age: 77
End: 2022-12-21
Payer: MEDICARE

## 2022-12-21 NOTE — PROGRESS NOTES
Spoke to Pt's  on 12/21/22 @ 11:09 am to scheduled knee injection with Alex elena on tomorrow 1/22/22 @ 8:30 am

## 2022-12-22 ENCOUNTER — OFFICE VISIT (OUTPATIENT)
Dept: ORTHOPEDICS | Facility: CLINIC | Age: 77
End: 2022-12-22
Payer: MEDICARE

## 2022-12-22 VITALS — WEIGHT: 171.5 LBS | BODY MASS INDEX: 24.55 KG/M2 | HEIGHT: 70 IN

## 2022-12-22 DIAGNOSIS — Z96.651 STATUS POST RIGHT KNEE REPLACEMENT: ICD-10-CM

## 2022-12-22 DIAGNOSIS — M25.462 EFFUSION OF LEFT KNEE: ICD-10-CM

## 2022-12-22 DIAGNOSIS — M17.12 PRIMARY OSTEOARTHRITIS OF LEFT KNEE: Primary | ICD-10-CM

## 2022-12-22 PROCEDURE — 1101F PT FALLS ASSESS-DOCD LE1/YR: CPT | Mod: HCNC,CPTII,S$GLB, | Performed by: PHYSICIAN ASSISTANT

## 2022-12-22 PROCEDURE — 3288F PR FALLS RISK ASSESSMENT DOCUMENTED: ICD-10-PCS | Mod: HCNC,CPTII,S$GLB, | Performed by: PHYSICIAN ASSISTANT

## 2022-12-22 PROCEDURE — 1125F AMNT PAIN NOTED PAIN PRSNT: CPT | Mod: HCNC,CPTII,S$GLB, | Performed by: PHYSICIAN ASSISTANT

## 2022-12-22 PROCEDURE — 1159F PR MEDICATION LIST DOCUMENTED IN MEDICAL RECORD: ICD-10-PCS | Mod: HCNC,CPTII,S$GLB, | Performed by: PHYSICIAN ASSISTANT

## 2022-12-22 PROCEDURE — 1159F MED LIST DOCD IN RCRD: CPT | Mod: HCNC,CPTII,S$GLB, | Performed by: PHYSICIAN ASSISTANT

## 2022-12-22 PROCEDURE — 99999 PR PBB SHADOW E&M-EST. PATIENT-LVL III: CPT | Mod: PBBFAC,HCNC,, | Performed by: PHYSICIAN ASSISTANT

## 2022-12-22 PROCEDURE — 1160F RVW MEDS BY RX/DR IN RCRD: CPT | Mod: HCNC,CPTII,S$GLB, | Performed by: PHYSICIAN ASSISTANT

## 2022-12-22 PROCEDURE — 99213 PR OFFICE/OUTPT VISIT, EST, LEVL III, 20-29 MIN: ICD-10-PCS | Mod: HCNC,25,S$GLB, | Performed by: PHYSICIAN ASSISTANT

## 2022-12-22 PROCEDURE — 99999 PR PBB SHADOW E&M-EST. PATIENT-LVL III: ICD-10-PCS | Mod: PBBFAC,HCNC,, | Performed by: PHYSICIAN ASSISTANT

## 2022-12-22 PROCEDURE — 20610 DRAIN/INJ JOINT/BURSA W/O US: CPT | Mod: HCNC,LT,S$GLB, | Performed by: PHYSICIAN ASSISTANT

## 2022-12-22 PROCEDURE — 3288F FALL RISK ASSESSMENT DOCD: CPT | Mod: HCNC,CPTII,S$GLB, | Performed by: PHYSICIAN ASSISTANT

## 2022-12-22 PROCEDURE — 20610 PR DRAIN/INJECT LARGE JOINT/BURSA: ICD-10-PCS | Mod: HCNC,LT,S$GLB, | Performed by: PHYSICIAN ASSISTANT

## 2022-12-22 PROCEDURE — 1160F PR REVIEW ALL MEDS BY PRESCRIBER/CLIN PHARMACIST DOCUMENTED: ICD-10-PCS | Mod: HCNC,CPTII,S$GLB, | Performed by: PHYSICIAN ASSISTANT

## 2022-12-22 PROCEDURE — 1101F PR PT FALLS ASSESS DOC 0-1 FALLS W/OUT INJ PAST YR: ICD-10-PCS | Mod: HCNC,CPTII,S$GLB, | Performed by: PHYSICIAN ASSISTANT

## 2022-12-22 PROCEDURE — 99213 OFFICE O/P EST LOW 20 MIN: CPT | Mod: HCNC,25,S$GLB, | Performed by: PHYSICIAN ASSISTANT

## 2022-12-22 PROCEDURE — 1125F PR PAIN SEVERITY QUANTIFIED, PAIN PRESENT: ICD-10-PCS | Mod: HCNC,CPTII,S$GLB, | Performed by: PHYSICIAN ASSISTANT

## 2022-12-22 RX ORDER — BETAMETHASONE SODIUM PHOSPHATE AND BETAMETHASONE ACETATE 3; 3 MG/ML; MG/ML
6 INJECTION, SUSPENSION INTRA-ARTICULAR; INTRALESIONAL; INTRAMUSCULAR; SOFT TISSUE
Status: COMPLETED | OUTPATIENT
Start: 2022-12-22 | End: 2022-12-22

## 2022-12-22 RX ADMIN — BETAMETHASONE SODIUM PHOSPHATE AND BETAMETHASONE ACETATE 6 MG: 3; 3 INJECTION, SUSPENSION INTRA-ARTICULAR; INTRALESIONAL; INTRAMUSCULAR; SOFT TISSUE at 09:12

## 2022-12-22 NOTE — PROGRESS NOTES
SUBJECTIVE:     Chief Complaint & History of Present Illness:  Aurora Castle is a Established patient 77 y.o. female who is seen here today with a complaint of    Chief Complaint   Patient presents with    Left Knee - Pain    .  She is patient well-known to us was last seen treated the clinic 09/29/2022 for her left knee pain at which time she would undergone cortisone injection with very good results.  She is begun to have return of soreness and pain in the left knee difficulty with start-up pain in periods of prolonged standing and ambulation is also developed a moderate effusion in the knee.  She is aware she has moderate arthritis and will be needing knee replacement surgery in the not too distant future.  On a scale of 1-10, with 10 being worst pain imaginable, he rates this pain as 4 on good days and 7 on bad days.  she describes the pain as sore and achy.    Review of patient's allergies indicates:   Allergen Reactions    Erythromycin Itching and Swelling     Eye swelling   Local eye ointment         Current Outpatient Medications   Medication Sig Dispense Refill    ascorbic acid, vitamin C, (VITAMIN C) 1000 MG tablet Take 1 tablet by mouth Daily. Hold medication one week prior to surgery      BIOTIN ORAL Take 5,000 mcg by mouth once daily. Hold medication one week prior to surgery      cholecalciferol, vitamin D3, (VITAMIN D3) 5,000 unit Tab Take 1 tablet by mouth once daily. (Patient taking differently: Take 5,000 Units by mouth once daily. Hold medication one week prior to surgery)      doxylamine succinate (UNISOM, DOXYLAMINE, ORAL) Take by mouth every evening. TAKES AT NIGHT      fish oil-omega-3 fatty acids 300-1,000 mg capsule Take 2 g by mouth once daily. Hold medication one week prior to surgery      glucosamine-chondroit-vit C-Mn 544-066-05-2.5 mg Cap Take 1 capsule by mouth once daily. Hold medication one week prior to surgery      MELATONIN ORAL Take by mouth every evening. TAKES AT NIGHT       MULTIVITAMIN WITH MINERALS (MULTIVITAMIN & MINERAL FORMULA) Tab Take 1 tablet by mouth Daily. Hold medication one week prior to surgery      VIT C/VIT E/LUTEIN/MIN/OMEGA-3 (OCUVITE ORAL) Take 1 tablet by mouth once daily. Hold medication one week prior to surgery       Current Facility-Administered Medications   Medication Dose Route Frequency Provider Last Rate Last Admin    betamethasone acetate-betamethasone sodium phosphate injection 6 mg  6 mg Intra-articular 1 time in Clinic/HOD Alex Julien PA-C           Past Medical History:   Diagnosis Date    Arthritis     History of adenomatous polyp of colon     Joint pain     Pneumonia     during [facility]     Tendonitis of ankle or foot     Trouble in sleeping     Urinary incontinence        Past Surgical History:   Procedure Laterality Date     SECTION          COLONOSCOPY N/A 2018    Procedure: COLONOSCOPY;  Surgeon: Jim Borges MD;  Location: [address];  Service: Endoscopy;  Laterality: N/A;    hemorrhiodectomy      tendon surgery L foot      TOTAL KNEE ARTHROPLASTY Right 2020    Procedure: ARTHROPLASTY, KNEE, TOTAL-DEPUY-SIGMA/DEPUY-TC3/MBT back up;  Surgeon: Zack Patton III, MD;  Location: [facility];  Service: Orthopedics;  Laterality: Right;       Vital Signs (Most Recent)  There were no vitals filed for this visit.        Review of Systems:  ROS:  Constitutional: no fever or chills, positive for insomnia  Eyes: no visual changes  ENT: no nasal congestion or sore throat  Respiratory: no cough or shortness of breath  Cardiovascular: no chest pain or palpitations  Gastrointestinal: no nausea or vomiting, tolerating diet  Genitourinary: no hematuria or dysuria  Integument/Breast: no rash or pruritis, positive dermatitis  Hematologic/Lymphatic: no easy bruising or lymphadenopathy, positive macrocytosis  Musculoskeletal: no arthralgias or myalgias, status post right TKA 2020  Neurological: no seizures or  "tremors  Behavioral/Psych: no auditory or visual hallucinations  Endocrine: no heat or cold intolerance, vitamin-D deficiency                OBJECTIVE:     PHYSICAL EXAM:  Height: 5' 10" (177.8 cm) Weight: 77.8 kg (171 lb 8.3 oz), General Appearance: Well nourished, well developed, in no acute distress.  Neurological: Mood & affect are normal.    left  Knee Exam:  Knee Range of Motion:3-115 degrees flexion   Effusion:yes  Condition of skin:intact  Location of tenderness:Medial joint line   Strength:limited by pain and 5 of 5  Stability:  Lachman: stable, LCL: stable, MCL: stable, PCL: stable, and posteromedial (dial): stable  Varus /Valgus stress:  normal  Adrian:   negative/negative    right  Knee Exam:  Knee Range of Motion:0-120 degrees flexion   Effusion:none  Condition of skin:intact  Location of tenderness:None   Strength:5 of 5  Stability:  stable to testing, Lachman: stable, LCL: stable, MCL: stable, and PCL: stable  Varus /Valgus stress:  normal      Hip Examination:  full painless range of motion, without tenderness    RADIOGRAPHS:  X-rays from previous visit reviewed by me today demonstrate moderate arthritic changes throughout the left knee with medial and lateral joint space narrowing sclerotic changes noted tricompartmentally early osteophytic spurring most notable in the lateral compartments.  Right knee demonstrates well-fixed well-aligned prosthesis with no evidence of loosening wear mechanical abnormalities    ASSESSMENT/PLAN:       ICD-10-CM ICD-9-CM   1. Primary osteoarthritis of left knee  M17.12 715.16   2. Status post right knee replacement  Z96.651 V43.65   3. Effusion of left knee  M25.462 719.06       Plan: We discussed with the patient at length all the different treatment options available for  the knee including anti-inflammatories, acetaminophen, rest, ice, knee strengthening exercise, occasional cortisone injections for temporary relief, Viscosupplimentation injections, arthroscopic " debridement osteotomy, and finally knee arthroplasty.   Will proceed with aspiration cortisone injection of the left knee.    Patient is very interested in pursuing viscosupplementation in the left knee when and if she has return of pain soreness in the knee she will contact the clinic and we will order medication     The injection site was identified and the skin was prepared with a betadine solution. The   left  knee was aspirated and 60 cc of normal-appearing fluid was withdrawn the knee was then injected with 1 ml of Celestone and 5 ml Lidocaine under sterile technique. Aurora Castle tolerated the procedure well, she was advised to rest the knee today, ice and elevation. she did receive immediate relief of the pain in and about his knee she was told this would be short lived and is secondary to the lidocaine. she may have an increase in his discomfort tonight followed by steady improvement over the next several days. I may take 1-3 weeks following the injection to get the full benefit of the medication.  I will see her back in 4-6 months. Sooner if he has any problems or concerns.

## 2023-02-07 DIAGNOSIS — Z00.00 ENCOUNTER FOR MEDICARE ANNUAL WELLNESS EXAM: ICD-10-CM

## 2023-02-09 DIAGNOSIS — Z00.00 ENCOUNTER FOR MEDICARE ANNUAL WELLNESS EXAM: ICD-10-CM

## 2023-03-29 ENCOUNTER — PATIENT MESSAGE (OUTPATIENT)
Dept: ORTHOPEDICS | Facility: CLINIC | Age: 78
End: 2023-03-29
Payer: MEDICARE

## 2023-04-18 ENCOUNTER — OFFICE VISIT (OUTPATIENT)
Dept: ORTHOPEDICS | Facility: CLINIC | Age: 78
End: 2023-04-18
Payer: MEDICARE

## 2023-04-18 VITALS
HEART RATE: 59 BPM | WEIGHT: 171.5 LBS | DIASTOLIC BLOOD PRESSURE: 81 MMHG | SYSTOLIC BLOOD PRESSURE: 144 MMHG | HEIGHT: 70 IN | BODY MASS INDEX: 24.55 KG/M2

## 2023-04-18 DIAGNOSIS — M17.12 PRIMARY OSTEOARTHRITIS OF LEFT KNEE: Primary | ICD-10-CM

## 2023-04-18 PROCEDURE — 3077F PR MOST RECENT SYSTOLIC BLOOD PRESSURE >= 140 MM HG: ICD-10-PCS | Mod: HCNC,CPTII,S$GLB, | Performed by: PHYSICIAN ASSISTANT

## 2023-04-18 PROCEDURE — 1125F AMNT PAIN NOTED PAIN PRSNT: CPT | Mod: HCNC,CPTII,S$GLB, | Performed by: PHYSICIAN ASSISTANT

## 2023-04-18 PROCEDURE — 20610 DRAIN/INJ JOINT/BURSA W/O US: CPT | Mod: HCNC,LT,S$GLB, | Performed by: PHYSICIAN ASSISTANT

## 2023-04-18 PROCEDURE — 1160F PR REVIEW ALL MEDS BY PRESCRIBER/CLIN PHARMACIST DOCUMENTED: ICD-10-PCS | Mod: HCNC,CPTII,S$GLB, | Performed by: PHYSICIAN ASSISTANT

## 2023-04-18 PROCEDURE — 99214 OFFICE O/P EST MOD 30 MIN: CPT | Mod: HCNC,25,S$GLB, | Performed by: PHYSICIAN ASSISTANT

## 2023-04-18 PROCEDURE — 1125F PR PAIN SEVERITY QUANTIFIED, PAIN PRESENT: ICD-10-PCS | Mod: HCNC,CPTII,S$GLB, | Performed by: PHYSICIAN ASSISTANT

## 2023-04-18 PROCEDURE — 99214 PR OFFICE/OUTPT VISIT, EST, LEVL IV, 30-39 MIN: ICD-10-PCS | Mod: HCNC,25,S$GLB, | Performed by: PHYSICIAN ASSISTANT

## 2023-04-18 PROCEDURE — 3077F SYST BP >= 140 MM HG: CPT | Mod: HCNC,CPTII,S$GLB, | Performed by: PHYSICIAN ASSISTANT

## 2023-04-18 PROCEDURE — 99999 PR PBB SHADOW E&M-EST. PATIENT-LVL III: ICD-10-PCS | Mod: PBBFAC,HCNC,, | Performed by: PHYSICIAN ASSISTANT

## 2023-04-18 PROCEDURE — 3079F PR MOST RECENT DIASTOLIC BLOOD PRESSURE 80-89 MM HG: ICD-10-PCS | Mod: HCNC,CPTII,S$GLB, | Performed by: PHYSICIAN ASSISTANT

## 2023-04-18 PROCEDURE — 20610 PR DRAIN/INJECT LARGE JOINT/BURSA: ICD-10-PCS | Mod: HCNC,LT,S$GLB, | Performed by: PHYSICIAN ASSISTANT

## 2023-04-18 PROCEDURE — 3079F DIAST BP 80-89 MM HG: CPT | Mod: HCNC,CPTII,S$GLB, | Performed by: PHYSICIAN ASSISTANT

## 2023-04-18 PROCEDURE — 1160F RVW MEDS BY RX/DR IN RCRD: CPT | Mod: HCNC,CPTII,S$GLB, | Performed by: PHYSICIAN ASSISTANT

## 2023-04-18 PROCEDURE — 1159F MED LIST DOCD IN RCRD: CPT | Mod: HCNC,CPTII,S$GLB, | Performed by: PHYSICIAN ASSISTANT

## 2023-04-18 PROCEDURE — 99999 PR PBB SHADOW E&M-EST. PATIENT-LVL III: CPT | Mod: PBBFAC,HCNC,, | Performed by: PHYSICIAN ASSISTANT

## 2023-04-18 PROCEDURE — 1159F PR MEDICATION LIST DOCUMENTED IN MEDICAL RECORD: ICD-10-PCS | Mod: HCNC,CPTII,S$GLB, | Performed by: PHYSICIAN ASSISTANT

## 2023-04-18 RX ORDER — BETAMETHASONE SODIUM PHOSPHATE AND BETAMETHASONE ACETATE 3; 3 MG/ML; MG/ML
6 INJECTION, SUSPENSION INTRA-ARTICULAR; INTRALESIONAL; INTRAMUSCULAR; SOFT TISSUE
Status: COMPLETED | OUTPATIENT
Start: 2023-04-18 | End: 2023-04-18

## 2023-04-18 RX ADMIN — BETAMETHASONE SODIUM PHOSPHATE AND BETAMETHASONE ACETATE 6 MG: 3; 3 INJECTION, SUSPENSION INTRA-ARTICULAR; INTRALESIONAL; INTRAMUSCULAR; SOFT TISSUE at 10:04

## 2023-04-18 NOTE — PROGRESS NOTES
SUBJECTIVE:     Chief Complaint & History of Present Illness:  Aurora Castle is a Established patient 77 y.o. female who is seen here today with a complaint of  left knee pain .  She is patient well-known to me was last seen treated the clinic for this condition 12/22/2022 at which time she would undergone cortisone injection of the left knee with very good results.  She does have severe arthritis in the left knee and is aware she is in need of knee replacement surgery.  She is trying to hold off until the end of the year to discuss this option.  She works at [employer] and will be bending significant amounts of time standing and ambulating for the next several weeks periods resting repeat cortisone injection today to address her immediate symptoms  On a scale of 1-10, with 10 being worst pain imaginable, he rates this pain as 5 on good days and 8 on bad days.  she describes the pain as sore and achy.    Review of patient's allergies indicates:   Allergen Reactions    Erythromycin Itching and Swelling     Eye swelling   Local eye ointment         Current Outpatient Medications   Medication Sig Dispense Refill    ascorbic acid, vitamin C, (VITAMIN C) 1000 MG tablet Take 1 tablet by mouth Daily. Hold medication one week prior to surgery      BIOTIN ORAL Take 5,000 mcg by mouth once daily. Hold medication one week prior to surgery      cholecalciferol, vitamin D3, (VITAMIN D3) 5,000 unit Tab Take 1 tablet by mouth once daily. (Patient taking differently: Take 5,000 Units by mouth once daily. Hold medication one week prior to surgery)      doxylamine succinate (UNISOM, DOXYLAMINE, ORAL) Take by mouth every evening. TAKES AT NIGHT      fish oil-omega-3 fatty acids 300-1,000 mg capsule Take 2 g by mouth once daily. Hold medication one week prior to surgery      glucosamine-chondroit-vit C-Mn 086-656-55-2.5 mg Cap Take 1 capsule by mouth once daily. Hold medication one week prior to surgery      MELATONIN ORAL Take by  mouth every evening. TAKES AT NIGHT      MULTIVITAMIN WITH MINERALS (MULTIVITAMIN & MINERAL FORMULA) Tab Take 1 tablet by mouth Daily. Hold medication one week prior to surgery      VIT C/VIT E/LUTEIN/MIN/OMEGA-3 (OCUVITE ORAL) Take 1 tablet by mouth once daily. Hold medication one week prior to surgery       Current Facility-Administered Medications   Medication Dose Route Frequency Provider Last Rate Last Admin    betamethasone acetate-betamethasone sodium phosphate injection 6 mg  6 mg Intra-articular 1 time in Clinic/HOD Alex Julien PA-C           Past Medical History:   Diagnosis Date    Arthritis     History of adenomatous polyp of colon     Joint pain     Pneumonia     during [facility]     Tendonitis of ankle or foot     Trouble in sleeping     Urinary incontinence        Past Surgical History:   Procedure Laterality Date     SECTION          COLONOSCOPY N/A 2018    Procedure: COLONOSCOPY;  Surgeon: Jim Borges MD;  Location: [address];  Service: Endoscopy;  Laterality: N/A;    hemorrhiodectomy      tendon surgery L foot      TOTAL KNEE ARTHROPLASTY Right 2020    Procedure: ARTHROPLASTY, KNEE, TOTAL-DEPUY-SIGMA/DEPUY-TC3/MBT back up;  Surgeon: Zack Patton III, MD;  Location: [facility];  Service: Orthopedics;  Laterality: Right;       Vital Signs (Most Recent)  Vitals:    23 1017   BP: (!) 144/81   Pulse: (!) 59           Review of Systems:  ROS:  Constitutional: no fever or chills, positive for insomnia  Eyes: no visual changes  ENT: no nasal congestion or sore throat  Respiratory: no cough or shortness of breath  Cardiovascular: no chest pain or palpitations  Gastrointestinal: no nausea or vomiting, tolerating diet  Genitourinary: no hematuria or dysuria  Integument/Breast: no rash or pruritis, positive dermatitis  Hematologic/Lymphatic: no easy bruising or lymphadenopathy, positive macrocytosis  Musculoskeletal: no arthralgias or myalgias, status post right  "TKA 06/08/2020  Neurological: no seizures or tremors  Behavioral/Psych: no auditory or visual hallucinations  Endocrine: no heat or cold intolerance, vitamin-D deficiency                OBJECTIVE:     PHYSICAL EXAM:  Height: 5' 10" (177.8 cm) Weight: 77.8 kg (171 lb 8.3 oz), General Appearance: Well nourished, well developed, in no acute distress.  Neurological: Mood & affect are normal.     left  Knee Exam:  Knee Range of Motion:3-120degrees flexion   Effusion:mild  Condition of skin:intact  Location of tenderness:Medial joint line   Strength:limited by pain and 5 of 5  Stability:  Lachman: stable, LCL: stable, MCL: stable, PCL: stable, and posteromedial (dial): stable  Varus /Valgus stress:  normal  Adrian:   negative/negative     right  Knee Exam:  Knee Range of Motion:0-120 degrees flexion   Effusion:none  Condition of skin:intact  Location of tenderness:None   Strength:5 of 5  Stability:  stable to testing, Lachman: stable, LCL: stable, MCL: stable, and PCL: stable  Varus /Valgus stress:  normal        Hip Examination:  full painless range of motion, without tenderness    RADIOGRAPHS:  X-rays from previous visit reviewed by me today demonstrate moderate arthritic changes throughout the left knee with medial and lateral joint space narrowing sclerotic changes noted tricompartmentally early osteophytic spurring most notable in the lateral compartments.  Right knee demonstrates well-fixed well-aligned prosthesis with no evidence of loosening wear mechanical abnormalities    ASSESSMENT/PLAN:       ICD-10-CM ICD-9-CM   1. Primary osteoarthritis of left knee  M17.12 715.16       Plan: We discussed with the patient at length all the different treatment options available for  the knee including anti-inflammatories, acetaminophen, rest, ice, knee strengthening exercise, occasional cortisone injections for temporary relief, Viscosupplimentation injections, arthroscopic debridement osteotomy, and finally knee " arthroplasty.   Will proceed with repeat aspiration cortisone injection of the left knee     The injection site was identified and the skin was prepared with a betadine solution. The   left  knee was aspirated and 3-4 cc of normal-appearing fluid was withdrawn the knee was then injected with 1 ml of Celestone and 5 ml Lidocaine under sterile technique. Aurora Castle tolerated the procedure well, she was advised to rest the knee today, ice and elevation. she did receive immediate relief of the pain in and about his knee she was told this would be short lived and is secondary to the lidocaine. she may have an increase in his discomfort tonight followed by steady improvement over the next several days. I may take 1-3 weeks following the injection to get the full benefit of the medication.  I will see her back in 4-6 months. Sooner if he has any problems or concerns.

## 2023-05-31 ENCOUNTER — PATIENT MESSAGE (OUTPATIENT)
Dept: ORTHOPEDICS | Facility: CLINIC | Age: 78
End: 2023-05-31
Payer: MEDICARE

## 2023-06-20 ENCOUNTER — PES CALL (OUTPATIENT)
Dept: ADMINISTRATIVE | Facility: CLINIC | Age: 78
End: 2023-06-20
Payer: MEDICARE

## 2023-07-24 ENCOUNTER — PES CALL (OUTPATIENT)
Dept: ADMINISTRATIVE | Facility: CLINIC | Age: 78
End: 2023-07-24
Payer: MEDICARE

## 2023-08-15 ENCOUNTER — PATIENT MESSAGE (OUTPATIENT)
Dept: ORTHOPEDICS | Facility: CLINIC | Age: 78
End: 2023-08-15
Payer: MEDICARE

## 2023-08-28 ENCOUNTER — PATIENT MESSAGE (OUTPATIENT)
Dept: INTERNAL MEDICINE | Facility: CLINIC | Age: 78
End: 2023-08-28
Payer: MEDICARE

## 2023-08-28 RX ORDER — VALACYCLOVIR HYDROCHLORIDE 1 G/1
TABLET, FILM COATED ORAL
Qty: 15 TABLET | Refills: 1 | Status: SHIPPED | OUTPATIENT
Start: 2023-08-28 | End: 2023-09-13 | Stop reason: ALTCHOICE

## 2023-09-01 ENCOUNTER — OFFICE VISIT (OUTPATIENT)
Dept: URGENT CARE | Facility: CLINIC | Age: 78
End: 2023-09-01
Payer: MEDICARE

## 2023-09-01 VITALS
DIASTOLIC BLOOD PRESSURE: 94 MMHG | TEMPERATURE: 98 F | OXYGEN SATURATION: 95 % | RESPIRATION RATE: 16 BRPM | WEIGHT: 173.75 LBS | HEIGHT: 70 IN | SYSTOLIC BLOOD PRESSURE: 184 MMHG | BODY MASS INDEX: 24.87 KG/M2 | HEART RATE: 65 BPM

## 2023-09-01 DIAGNOSIS — J02.9 SORE THROAT: ICD-10-CM

## 2023-09-01 DIAGNOSIS — J40 BRONCHITIS: Primary | ICD-10-CM

## 2023-09-01 DIAGNOSIS — R06.2 WHEEZING: ICD-10-CM

## 2023-09-01 LAB
CTP QC/QA: YES
CTP QC/QA: YES
MOLECULAR STREP A: NEGATIVE
SARS-COV-2 AG RESP QL IA.RAPID: NEGATIVE

## 2023-09-01 PROCEDURE — 87811 SARS CORONAVIRUS 2 ANTIGEN POCT, MANUAL READ: ICD-10-PCS | Mod: QW,S$GLB,,

## 2023-09-01 PROCEDURE — 94640 AIRWAY INHALATION TREATMENT: CPT | Mod: S$GLB,,,

## 2023-09-01 PROCEDURE — 99213 PR OFFICE/OUTPT VISIT, EST, LEVL III, 20-29 MIN: ICD-10-PCS | Mod: 25,S$GLB,,

## 2023-09-01 PROCEDURE — 94640 PR INHAL RX, AIRWAY OBST/DX SPUTUM INDUCT: ICD-10-PCS | Mod: S$GLB,,,

## 2023-09-01 PROCEDURE — 87651 STREP A DNA AMP PROBE: CPT | Mod: QW,S$GLB,,

## 2023-09-01 PROCEDURE — 87651 POCT STREP A MOLECULAR: ICD-10-PCS | Mod: QW,S$GLB,,

## 2023-09-01 PROCEDURE — 99213 OFFICE O/P EST LOW 20 MIN: CPT | Mod: 25,S$GLB,,

## 2023-09-01 PROCEDURE — 87811 SARS-COV-2 COVID19 W/OPTIC: CPT | Mod: QW,S$GLB,,

## 2023-09-01 RX ORDER — BENZONATATE 100 MG/1
100 CAPSULE ORAL 3 TIMES DAILY PRN
Qty: 30 CAPSULE | Refills: 0 | Status: SHIPPED | OUTPATIENT
Start: 2023-09-01 | End: 2023-09-11

## 2023-09-01 RX ORDER — PROMETHAZINE HYDROCHLORIDE AND DEXTROMETHORPHAN HYDROBROMIDE 6.25; 15 MG/5ML; MG/5ML
2.5 SYRUP ORAL NIGHTLY PRN
Qty: 118 ML | Refills: 0 | Status: SHIPPED | OUTPATIENT
Start: 2023-09-01 | End: 2023-11-02

## 2023-09-01 RX ORDER — IBUPROFEN 600 MG/1
1200 TABLET ORAL 2 TIMES DAILY
COMMUNITY
Start: 2023-06-16 | End: 2023-12-21

## 2023-09-01 RX ORDER — ALBUTEROL SULFATE 0.83 MG/ML
2.5 SOLUTION RESPIRATORY (INHALATION)
Status: COMPLETED | OUTPATIENT
Start: 2023-09-01 | End: 2023-09-01

## 2023-09-01 RX ORDER — ALBUTEROL SULFATE 90 UG/1
2 AEROSOL, METERED RESPIRATORY (INHALATION) EVERY 6 HOURS PRN
Qty: 18 G | Refills: 0 | Status: SHIPPED | OUTPATIENT
Start: 2023-09-01 | End: 2023-11-02

## 2023-09-01 RX ADMIN — ALBUTEROL SULFATE 2.5 MG: 0.83 SOLUTION RESPIRATORY (INHALATION) at 12:09

## 2023-09-01 NOTE — PROGRESS NOTES
"Subjective:      Patient ID: Aurora Castle is a 77 y.o. female.    Vitals:  height is 5' 10" (1.778 m) and weight is 78.8 kg (173 lb 11.6 oz). Her oral temperature is 98 °F (36.7 °C). Her blood pressure is 184/94 (abnormal) and her pulse is 65. Her respiration is 16 and oxygen saturation is 95%.     Chief Complaint: Cough    Pt presents a productive cough and wheezing that began on Sunday. Pt also feels some swelling and soreness in throat. Pt has tx sx with vitamin C, and theraflu without improvement. Pt took at home covid test on Wednesday that resulted negative. No fevers. No hx of HTN. Denies headaches, vision changes, vomiting.     Cough  This is a new problem. The current episode started in the past 7 days. The problem has been unchanged. The problem occurs constantly. The cough is Productive of sputum. Associated symptoms include myalgias, nasal congestion, postnasal drip, rhinorrhea, a sore throat (scratchy) and wheezing. Pertinent negatives include no chest pain, chills, ear congestion, ear pain, fever, headaches or shortness of breath. She has tried OTC cough suppressant for the symptoms. Her past medical history is significant for bronchitis (in college) and pneumonia (in college). There is no history of asthma, bronchiectasis, COPD, emphysema or environmental allergies.       Constitution: Negative for chills, fatigue and fever.   HENT:  Positive for postnasal drip and sore throat (scratchy). Negative for ear pain and congestion.    Cardiovascular:  Negative for chest pain.   Respiratory:  Positive for cough and wheezing. Negative for shortness of breath.    Gastrointestinal:  Negative for abdominal pain, nausea, vomiting and diarrhea.   Musculoskeletal:  Positive for muscle ache.   Allergic/Immunologic: Negative for environmental allergies.   Neurological:  Negative for headaches.      Objective:     Physical Exam   Constitutional: She is oriented to person, place, and time. She appears " well-developed. She is cooperative.  Non-toxic appearance. She does not appear ill. No distress.   HENT:   Head: Normocephalic and atraumatic.   Ears:   Right Ear: Hearing, tympanic membrane, external ear and ear canal normal.   Left Ear: Hearing, tympanic membrane, external ear and ear canal normal.   Nose: Nose normal. No mucosal edema, rhinorrhea, nasal deformity or congestion. No epistaxis. Right sinus exhibits no maxillary sinus tenderness and no frontal sinus tenderness. Left sinus exhibits no maxillary sinus tenderness and no frontal sinus tenderness.   Mouth/Throat: Uvula is midline and mucous membranes are normal. No trismus in the jaw. Normal dentition. No uvula swelling. Posterior oropharyngeal erythema (mild) present. No oropharyngeal exudate or posterior oropharyngeal edema.   Eyes: Conjunctivae and lids are normal. Pupils are equal, round, and reactive to light. No scleral icterus.   Neck: Trachea normal and phonation normal. Neck supple. No edema present. No erythema present. No neck rigidity present.   Cardiovascular: Normal rate, regular rhythm, normal heart sounds and normal pulses.   Pulmonary/Chest: Effort normal. No accessory muscle usage. No tachypnea. No respiratory distress. She has no decreased breath sounds. She has wheezes in the right lower field and the left lower field. She has no rhonchi. She has no rales.   Abdominal: Normal appearance.   Musculoskeletal: Normal range of motion.         General: No deformity. Normal range of motion.   Neurological: She is alert and oriented to person, place, and time. She exhibits normal muscle tone. Coordination normal.   Skin: Skin is warm, dry, intact, not diaphoretic and not pale.   Psychiatric: Her speech is normal and behavior is normal. Judgment and thought content normal.   Nursing note and vitals reviewed.      Assessment:     1. Bronchitis    2. Sore throat    3. Wheezing        Plan:     Results for orders placed or performed in visit on  09/01/23   SARS Coronavirus 2 Antigen, POCT Manual Read   Result Value Ref Range    SARS Coronavirus 2 Antigen Negative Negative     Acceptable Yes    POCT Strep A, Molecular   Result Value Ref Range    Molecular Strep A, POC Negative Negative     Acceptable Yes          Bronchitis  -     albuterol (PROVENTIL HFA) 90 mcg/actuation inhaler; Inhale 2 puffs into the lungs every 6 (six) hours as needed for Wheezing or Shortness of Breath. Rescue  Dispense: 18 g; Refill: 0  -     benzonatate (TESSALON) 100 MG capsule; Take 1 capsule (100 mg total) by mouth 3 (three) times daily as needed for Cough.  Dispense: 30 capsule; Refill: 0  -     promethazine-dextromethorphan (PROMETHAZINE-DM) 6.25-15 mg/5 mL Syrp; Take 2.5 mLs by mouth nightly as needed (cough).  Dispense: 118 mL; Refill: 0    Sore throat  -     SARS Coronavirus 2 Antigen, POCT Manual Read  -     POCT Strep A, Molecular    Wheezing  -     albuterol nebulizer solution 2.5 mg  -     albuterol (PROVENTIL HFA) 90 mcg/actuation inhaler; Inhale 2 puffs into the lungs every 6 (six) hours as needed for Wheezing or Shortness of Breath. Rescue  Dispense: 18 g; Refill: 0            Discussed results/diagnosis/plan with patient in clinic. Strict precautions given to patient to monitor for worsening signs and symptoms. Advised to follow up with PCP or specialist.  Explained side effects of medications prescribed with patient and informed him/her to discontinue use if he/she has any side effects and to inform UC or PCP if this occurs. All questions answered. Strict ED verses clinic return precautions stressed and given in depth. Advised if symptoms worsens of fail to improve he/she should go to the Emergency Room. Discharge and follow-up instructions given verbally/printed with the patient who expressed understanding and willingness to comply with my recommendations. Patient voiced understanding and in agreement with current treatment plan.  Patient exits the exam room in no acute distress. Conversant and engaged during discharge discussion, verbalized understanding.

## 2023-09-01 NOTE — PATIENT INSTRUCTIONS
You are negative for COVID and strep today. Your symptoms are likely viral at this time.     When sick with a viral illness, cover your mouth and nose when sneezing and coughing. Wash hands frequently. Sanitize areas at home. Wear a mask in public if you need. Stay home if you are having fevers, chills, body aches, fatigue. Symptoms should resolve in 7-14 days. There is no specific treatment for viral illnesses. We treat symptoms with supportive care. Getting plenty of rest but completing light activities daily, eating a well-balanced diet, drinking plenty of fluids, managing stress levels can aid in faster recovery.      Try tessalon perles as directed during the day for your cough. It will help numb the back of your throat so you do not have the urge to cough.      Use albuterol inhaler for chest tightness/shortness of breath/wheezing/coughing fits as directed. I included information in your discharge paperwork about this medication for you to review. Check glucose at home and eat a well balanced diet to avoid elevated sugar levels.     Try a decongestant and corticosteroid nasal spray like flonase for the next few days for sinus relief. Initial: 2 sprays in each nostril once daily for 1 week. Reduce to 1 spray in each nostril once per day. Stop taking if you develop a nose bleed. Nasal saline spray can be used together with flonase to help moisten nostrils. An antihistamine like zyrtec, claritin, allegra can also be helpful for sinus relief and will help dry nasal passages.     Regular (Guaifenesin) Mucinex 1200 mg twice per day for 10 days can help thin secretions for better clearance. Drink plenty of fluids with this.     Honey is a natural cough suppressant.     -If you do NOT have high blood pressure, you may use a decongestant form (D)  of this medication (ie. Claritin- D, zyrtec-D, allegra-D, Mucinex-D) or if you do not take the D form, you can take sudafed (pseudoephedrine) over the counter, which is a  powerful decongestant. Do NOT take two decongestant (D) medications at the same time (such as mucinex-D and claritin-D or plain sudafed and claritin D). Dextromethorphan (DM) is a cough suppressant over the counter (ie. mucinex DM, robitussin, delsym; dayquil/nyquil has DM as well.) Try Afrin for nasal congestion at bedtime. Only use for 3 days as this can cause a rebound of congestion. Try cepacol for sore throat.     If you do have Hypertension or palpitations, it is safe to take Coricidin HBP (multi-symptom flu) for relief of sinus symptoms.  Try DASH diet to help lower BP and buy a blood pressure cuff for home monitoring. Check blood pressure at least 2 times per day and create a log. Avoid eating foods that are high in salt. Eat more foods with potassium, magnesium and calcium which will help dilate your vessels and decrease your BP.     Warm tea/warm liquids will help soothe the back of your throat. Warm water salt gurgles can also be helpful. A dry throat will cause pain. Make sure to stay hydrated. Water and pedilyte are the best to drink. Neti pot irrigation, humidifier in your room, avoiding fans, warm compresses to face, eating/drinking hot soups, hot shower before bedtime can help.     The recommended daily fluid intake for women is 2.7 liters (five 16 oz bottles).      Alternate Tylenol and ibuprofen every 4 hours as needed for fever and body aches.  Please take NSAIDs with a full glass of water and food to avoid GI upset.      Please only use over the counter cough and cold medications for 3-5 days at a time to avoid rebound symptoms.     Getting plenty of rest can aid in a faster recovery of illnesses.     Please follow-up with your primary care provider or return to the clinic if not better/worsening symptoms in 1 week.     Report to the ER if you have chest pain, shortness of breath, palpitations.           Elevated BP  Check your BP twice per day and keep a log.      Try a DASH diet. I have  attached information in your discharge paperwork to review. A Mediterranean diet will help to decrease inflammation and would help lower your BP. Increase water intake to help your body get rid of an increase in salt. Avoid alcohol as this can increase your BP. Increase aerobic activity to 3 times per week and 45 min at a time. Practice good sleep hygiene by sleeping in a dark, cool, comfortable bed. No tv/device use before bed or tv while sleeping. It is recommended to get 7-8 hours of uninterrupted sleep per night.     The recommended daily fluid intake for women is 2.7 liters (five 16 oz bottles).     Go to the ER if your BP is consistently >160/100 and symptomatic with a severe headache, vomiting and unable to tolerate fluids, numbness/tingling/weakness to body, difficulty speaking, balance difficulty, confusion.          ([facility] recommendation)  The Mediterranean diet is based on plant-based foods and healthy fats.     You eat LOTS of vegetables, fruits, beans, lentils, nuts, whole grains (wheat bread, brown rice) & extra virgin olive oil.   ---- These foods are high in fiber and antioxidants.   ---- These nutrients help reduce inflammation.   ---- Fiber helps regulate bowel movements.   ---- Antioxidants protect you against cancer.     Eat a moderate amount of fish (salmon, herring, mackerel, sardines, anchovies, halibut, rainbow trout, tuna)   ---- (fish are high in omega-3 fatty acids)    Eat a moderate amount of cheese and yogurt.    Eat little or no meat-- eat more poultry (baked chicken, grilled chicken, ground turkey)  ---- avoid red meat and pork (causes inflammation and linked to colon cancer)    Eat little or no sweats, sugary drinks or butter.     Limit your sodium intake. A diet high in salt can increase your blood pressure and predisposes you to a heart attack or stroke.     BENEFITS OF DIET  --- Lowers your risk of cardiovascular disease (heart attack, stroke) and many other diseases.   ---  Supports a healthy body weight.   --- Supports a healthy blood sugar, blood pressure and cholesterol.   --- Lowers your risk of metabolic syndrome   --- Supports a heathy balance of good gut bacteria in your digestive system.   --- Lowers your risk for cancer.   --- Helps with brain function and slows decline as we age.   --- Helps you live longer.     Talk to your primary care doctor about a dietician referral for further guidance.

## 2023-09-11 ENCOUNTER — PATIENT MESSAGE (OUTPATIENT)
Dept: ORTHOPEDICS | Facility: CLINIC | Age: 78
End: 2023-09-11
Payer: MEDICARE

## 2023-09-11 DIAGNOSIS — M17.12 PRIMARY OSTEOARTHRITIS OF LEFT KNEE: Primary | ICD-10-CM

## 2023-09-13 ENCOUNTER — OFFICE VISIT (OUTPATIENT)
Dept: INTERNAL MEDICINE | Facility: CLINIC | Age: 78
End: 2023-09-13
Payer: MEDICARE

## 2023-09-13 VITALS
BODY MASS INDEX: 25.21 KG/M2 | DIASTOLIC BLOOD PRESSURE: 86 MMHG | HEART RATE: 64 BPM | SYSTOLIC BLOOD PRESSURE: 128 MMHG | OXYGEN SATURATION: 98 % | WEIGHT: 176.13 LBS | HEIGHT: 70 IN

## 2023-09-13 DIAGNOSIS — D69.2 SENILE PURPURA: ICD-10-CM

## 2023-09-13 DIAGNOSIS — Z00.00 ENCOUNTER FOR PREVENTIVE HEALTH EXAMINATION: Primary | ICD-10-CM

## 2023-09-13 DIAGNOSIS — E55.9 MILD VITAMIN D DEFICIENCY: ICD-10-CM

## 2023-09-13 DIAGNOSIS — D75.89 MACROCYTOSIS: ICD-10-CM

## 2023-09-13 DIAGNOSIS — Z00.00 ENCOUNTER FOR MEDICARE ANNUAL WELLNESS EXAM: ICD-10-CM

## 2023-09-13 PROBLEM — R60.9 EDEMA: Status: RESOLVED | Noted: 2020-04-23 | Resolved: 2023-09-13

## 2023-09-13 PROCEDURE — 99999 PR PBB SHADOW E&M-EST. PATIENT-LVL V: ICD-10-PCS | Mod: PBBFAC,HCNC,, | Performed by: NURSE PRACTITIONER

## 2023-09-13 PROCEDURE — 1160F RVW MEDS BY RX/DR IN RCRD: CPT | Mod: HCNC,CPTII,S$GLB, | Performed by: NURSE PRACTITIONER

## 2023-09-13 PROCEDURE — 3074F PR MOST RECENT SYSTOLIC BLOOD PRESSURE < 130 MM HG: ICD-10-PCS | Mod: HCNC,CPTII,S$GLB, | Performed by: NURSE PRACTITIONER

## 2023-09-13 PROCEDURE — 1159F MED LIST DOCD IN RCRD: CPT | Mod: HCNC,CPTII,S$GLB, | Performed by: NURSE PRACTITIONER

## 2023-09-13 PROCEDURE — 3079F PR MOST RECENT DIASTOLIC BLOOD PRESSURE 80-89 MM HG: ICD-10-PCS | Mod: HCNC,CPTII,S$GLB, | Performed by: NURSE PRACTITIONER

## 2023-09-13 PROCEDURE — 1101F PR PT FALLS ASSESS DOC 0-1 FALLS W/OUT INJ PAST YR: ICD-10-PCS | Mod: HCNC,CPTII,S$GLB, | Performed by: NURSE PRACTITIONER

## 2023-09-13 PROCEDURE — 1159F PR MEDICATION LIST DOCUMENTED IN MEDICAL RECORD: ICD-10-PCS | Mod: HCNC,CPTII,S$GLB, | Performed by: NURSE PRACTITIONER

## 2023-09-13 PROCEDURE — 1170F PR FUNCTIONAL STATUS ASSESSED: ICD-10-PCS | Mod: HCNC,CPTII,S$GLB, | Performed by: NURSE PRACTITIONER

## 2023-09-13 PROCEDURE — 3288F PR FALLS RISK ASSESSMENT DOCUMENTED: ICD-10-PCS | Mod: HCNC,CPTII,S$GLB, | Performed by: NURSE PRACTITIONER

## 2023-09-13 PROCEDURE — G0439 PPPS, SUBSEQ VISIT: HCPCS | Mod: HCNC,S$GLB,, | Performed by: NURSE PRACTITIONER

## 2023-09-13 PROCEDURE — 1170F FXNL STATUS ASSESSED: CPT | Mod: HCNC,CPTII,S$GLB, | Performed by: NURSE PRACTITIONER

## 2023-09-13 PROCEDURE — 3288F FALL RISK ASSESSMENT DOCD: CPT | Mod: HCNC,CPTII,S$GLB, | Performed by: NURSE PRACTITIONER

## 2023-09-13 PROCEDURE — 3079F DIAST BP 80-89 MM HG: CPT | Mod: HCNC,CPTII,S$GLB, | Performed by: NURSE PRACTITIONER

## 2023-09-13 PROCEDURE — 1101F PT FALLS ASSESS-DOCD LE1/YR: CPT | Mod: HCNC,CPTII,S$GLB, | Performed by: NURSE PRACTITIONER

## 2023-09-13 PROCEDURE — 99999 PR PBB SHADOW E&M-EST. PATIENT-LVL V: CPT | Mod: PBBFAC,HCNC,, | Performed by: NURSE PRACTITIONER

## 2023-09-13 PROCEDURE — 1126F PR PAIN SEVERITY QUANTIFIED, NO PAIN PRESENT: ICD-10-PCS | Mod: HCNC,CPTII,S$GLB, | Performed by: NURSE PRACTITIONER

## 2023-09-13 PROCEDURE — 1126F AMNT PAIN NOTED NONE PRSNT: CPT | Mod: HCNC,CPTII,S$GLB, | Performed by: NURSE PRACTITIONER

## 2023-09-13 PROCEDURE — 3074F SYST BP LT 130 MM HG: CPT | Mod: HCNC,CPTII,S$GLB, | Performed by: NURSE PRACTITIONER

## 2023-09-13 PROCEDURE — 1160F PR REVIEW ALL MEDS BY PRESCRIBER/CLIN PHARMACIST DOCUMENTED: ICD-10-PCS | Mod: HCNC,CPTII,S$GLB, | Performed by: NURSE PRACTITIONER

## 2023-09-13 PROCEDURE — G0439 PR MEDICARE ANNUAL WELLNESS SUBSEQUENT VISIT: ICD-10-PCS | Mod: HCNC,S$GLB,, | Performed by: NURSE PRACTITIONER

## 2023-09-13 NOTE — PATIENT INSTRUCTIONS
Counseling and Referral of Other Preventative  (Italic type indicates deductible and co-insurance are waived)    Patient Name: Aurora Castle  Today's Date: 9/13/2023    Health Maintenance       Date Due Completion Date    COVID-19 Vaccine (5 - Pfizer series) 02/04/2023 10/4/2022    TETANUS VACCINE 03/04/2023 3/4/2013    Colorectal Cancer Screening 06/01/2023 6/1/2018    Influenza Vaccine (1) 09/01/2023 10/21/2022    Override on 10/5/2020: Done (At [facility])    Mammogram 10/27/2023 10/27/2022    Lipid Panel 11/28/2027 11/28/2022        No orders of the defined types were placed in this encounter.    The following information is provided to all patients.  This information is to help you find resources for any of the problems found today that may be affecting your health:                Living healthy guide: www.[facility].[place]      Understanding Diabetes: www.diabetes.org      Eating healthy: www.cdc.gov/healthyweight      [facility] home safety checklist: www.cdc.gov/steadi/patient.html      Agency on Aging: www.goea.[place]      Alcoholics anonymous (AA): www.aa.org      Physical Activity: www.jim.nih.gov/sd0ubwb      Tobacco use: www.quitwithusla.org

## 2023-09-13 NOTE — PROGRESS NOTES
"  Aurora Castle presented for a  Medicare AWV and comprehensive Health Risk Assessment today. The following components were reviewed and updated:    Medical history  Family History  Social history  Allergies and Current Medications  Health Risk Assessment  Health Maintenance  Care Team         ** See Completed Assessments for Annual Wellness Visit within the encounter summary.**         The following assessments were completed:  Living Situation  CAGE  Depression Screening  Timed Get Up and Go  Whisper Test  Cognitive Function Screening      Nutrition Screening  ADL Screening  PAQ Screening  OPIOID Screening: Patient does not have a prescription for narcotics. Patient does not use substance         Vitals:    09/13/23 1420   BP: 128/86   BP Location: Right arm   Pulse: 64   SpO2: 98%   Weight: 79.9 kg (176 lb 2.4 oz)   Height: 5' 10" (1.778 m)     Body mass index is 25.27 kg/m².  Physical Exam  Vitals and nursing note reviewed.   Constitutional:       Appearance: She is well-developed.   HENT:      Head: Normocephalic.   Cardiovascular:      Rate and Rhythm: Normal rate and regular rhythm.      Heart sounds: Normal heart sounds. No murmur heard.  Pulmonary:      Effort: Pulmonary effort is normal.      Breath sounds: Normal breath sounds.   Abdominal:      General: Bowel sounds are normal.      Palpations: Abdomen is soft.   Musculoskeletal:         General: Normal range of motion.   Skin:     General: Skin is warm and dry.   Neurological:      Mental Status: She is alert and oriented to person, place, and time.      Motor: No abnormal muscle tone.   Psychiatric:         Mood and Affect: Mood normal.               Diagnoses and health risks identified today and associated recommendations/orders:    1. Encounter for preventive health examination  Here for Health Risk Assessment/Annual Wellness Visit.  Health maintenance reviewed and updated. Follow up in one year.     2. Mild vitamin D deficiency  Chronic, stable " on current medications. Followed by PCP.     3. Macrocytosis  Chronic, stable. Followed by PCP    4. Senile purpura  Chronic, stable. Followed by PCP.    5. Encounter for Medicare annual wellness exam  - Ambulatory Referral/Consult to Enhanced Annual Wellness Visit (eAWV)      Provided Aurora with a 5-10 year written screening schedule and personal prevention plan. Recommendations were developed using the USPSTF age appropriate recommendations. Education, counseling, and referrals were provided as needed. After Visit Summary printed and given to patient which includes a list of additional screenings\tests needed.    Follow up in 12 weeks (on 12/6/2023).with PCP    Marily Estrada NP

## 2023-09-25 ENCOUNTER — TELEPHONE (OUTPATIENT)
Dept: INTERNAL MEDICINE | Facility: CLINIC | Age: 78
End: 2023-09-25
Payer: MEDICARE

## 2023-09-25 DIAGNOSIS — Z12.31 ENCOUNTER FOR SCREENING MAMMOGRAM FOR BREAST CANCER: Primary | ICD-10-CM

## 2023-09-25 NOTE — TELEPHONE ENCOUNTER
----- Message from Nemesio Payton sent at 9/25/2023 12:18 PM CDT -----  Contact: [phone]  Type:  Mammogram    Caller is requesting to schedule their annual mammogram appointment.  Order is not listed in EPIC.  Please enter order and contact patient to schedule.  Name of Caller: Aurora Castle  Where would they like the mammogram performed? Main campus  Would the patient rather a call back or a response via [facility]? callback  Best Call Back Number:[phone]  Additional Information:  also would like to otder lab work for annual in December with NP. Please advise.

## 2023-09-26 ENCOUNTER — OFFICE VISIT (OUTPATIENT)
Dept: ORTHOPEDICS | Facility: CLINIC | Age: 78
End: 2023-09-26
Payer: MEDICARE

## 2023-09-26 ENCOUNTER — HOSPITAL ENCOUNTER (OUTPATIENT)
Dept: RADIOLOGY | Facility: HOSPITAL | Age: 78
Discharge: HOME | End: 2023-09-26
Attending: ORTHOPAEDIC SURGERY
Payer: MEDICARE

## 2023-09-26 VITALS — HEIGHT: 71 IN | BODY MASS INDEX: 24.37 KG/M2 | WEIGHT: 174.06 LBS

## 2023-09-26 DIAGNOSIS — M17.12 PRIMARY OSTEOARTHRITIS OF LEFT KNEE: ICD-10-CM

## 2023-09-26 DIAGNOSIS — M17.12 PRIMARY OSTEOARTHRITIS OF LEFT KNEE: Primary | ICD-10-CM

## 2023-09-26 DIAGNOSIS — Z96.651 STATUS POST RIGHT KNEE REPLACEMENT: ICD-10-CM

## 2023-09-26 PROCEDURE — 20610 LARGE JOINT ASPIRATION/INJECTION: L KNEE: ICD-10-PCS | Mod: HCNC,LT,S$GLB, | Performed by: ORTHOPAEDIC SURGERY

## 2023-09-26 PROCEDURE — 1125F AMNT PAIN NOTED PAIN PRSNT: CPT | Mod: HCNC,CPTII,S$GLB, | Performed by: ORTHOPAEDIC SURGERY

## 2023-09-26 PROCEDURE — 1101F PR PT FALLS ASSESS DOC 0-1 FALLS W/OUT INJ PAST YR: ICD-10-PCS | Mod: HCNC,CPTII,S$GLB, | Performed by: ORTHOPAEDIC SURGERY

## 2023-09-26 PROCEDURE — 73564 XR KNEE ORTHO LEFT WITH FLEXION: ICD-10-PCS | Mod: 26,HCNC,LT, | Performed by: RADIOLOGY

## 2023-09-26 PROCEDURE — 99999 PR PBB SHADOW E&M-EST. PATIENT-LVL III: ICD-10-PCS | Mod: PBBFAC,HCNC,, | Performed by: ORTHOPAEDIC SURGERY

## 2023-09-26 PROCEDURE — 73562 X-RAY EXAM OF KNEE 3: CPT | Mod: 26,HCNC,RT, | Performed by: RADIOLOGY

## 2023-09-26 PROCEDURE — 73564 X-RAY EXAM KNEE 4 OR MORE: CPT | Mod: 26,HCNC,LT, | Performed by: RADIOLOGY

## 2023-09-26 PROCEDURE — 1101F PT FALLS ASSESS-DOCD LE1/YR: CPT | Mod: HCNC,CPTII,S$GLB, | Performed by: ORTHOPAEDIC SURGERY

## 2023-09-26 PROCEDURE — 73562 XR KNEE ORTHO LEFT WITH FLEXION: ICD-10-PCS | Mod: 26,HCNC,RT, | Performed by: RADIOLOGY

## 2023-09-26 PROCEDURE — 99999 PR PBB SHADOW E&M-EST. PATIENT-LVL III: CPT | Mod: PBBFAC,HCNC,, | Performed by: ORTHOPAEDIC SURGERY

## 2023-09-26 PROCEDURE — 99214 OFFICE O/P EST MOD 30 MIN: CPT | Mod: 25,HCNC,S$GLB, | Performed by: ORTHOPAEDIC SURGERY

## 2023-09-26 PROCEDURE — 1159F MED LIST DOCD IN RCRD: CPT | Mod: HCNC,CPTII,S$GLB, | Performed by: ORTHOPAEDIC SURGERY

## 2023-09-26 PROCEDURE — 1159F PR MEDICATION LIST DOCUMENTED IN MEDICAL RECORD: ICD-10-PCS | Mod: HCNC,CPTII,S$GLB, | Performed by: ORTHOPAEDIC SURGERY

## 2023-09-26 PROCEDURE — 1125F PR PAIN SEVERITY QUANTIFIED, PAIN PRESENT: ICD-10-PCS | Mod: HCNC,CPTII,S$GLB, | Performed by: ORTHOPAEDIC SURGERY

## 2023-09-26 PROCEDURE — 20610 DRAIN/INJ JOINT/BURSA W/O US: CPT | Mod: HCNC,LT,S$GLB, | Performed by: ORTHOPAEDIC SURGERY

## 2023-09-26 PROCEDURE — 73562 X-RAY EXAM OF KNEE 3: CPT | Mod: TC,HCNC,RT

## 2023-09-26 PROCEDURE — 99214 PR OFFICE/OUTPT VISIT, EST, LEVL IV, 30-39 MIN: ICD-10-PCS | Mod: 25,HCNC,S$GLB, | Performed by: ORTHOPAEDIC SURGERY

## 2023-09-26 PROCEDURE — 3288F PR FALLS RISK ASSESSMENT DOCUMENTED: ICD-10-PCS | Mod: HCNC,CPTII,S$GLB, | Performed by: ORTHOPAEDIC SURGERY

## 2023-09-26 PROCEDURE — 3288F FALL RISK ASSESSMENT DOCD: CPT | Mod: HCNC,CPTII,S$GLB, | Performed by: ORTHOPAEDIC SURGERY

## 2023-09-26 RX ORDER — TRIAMCINOLONE ACETONIDE 40 MG/ML
40 INJECTION, SUSPENSION INTRA-ARTICULAR; INTRAMUSCULAR
Status: DISCONTINUED | OUTPATIENT
Start: 2023-09-26 | End: 2023-09-26 | Stop reason: HOSPADM

## 2023-09-26 RX ADMIN — TRIAMCINOLONE ACETONIDE 40 MG: 40 INJECTION, SUSPENSION INTRA-ARTICULAR; INTRAMUSCULAR at 01:09

## 2023-09-26 NOTE — PROGRESS NOTES
Subjective:     HPI:   Aurora Castle is a 78 y.o. female who presents for eval L knee    R TKA 6/8/20 doing well    Last seen by me for L knee OA 6/7/22  Doing aleve/tylenol  CSI  AAHKS HEP  Comp sleeve    EL 9/29/22: rpt L knee CSI    DS 12/22/22: asp 60cc, CSI  DS 4/18/23: asp 3-4cc, CSI    Progressive L knee pain, mod to severe, int sharp/stabbing/mech  Diminishing returns on CSI  Aleve 2 tab BID, aleve cream  HEP, riding bike 45min/day in park  Has tried comp sleeve  Ice    To review:   pain is activity related relieved with rest moderate to severe nature.  It is limiting her walking and activities of daily living.  She has failed non operative management.     To review:   She saw  in 2015 and had a steroid injection.  She saw Dr. [facility] in 2016 who did 2 steroid injections.  She has seen Alex in 2016 in 2017 who did a Synvisc-One injection in that around 3 Synvisc injections.      She complains of global moderate to severe activity-related right knee pain. No groin anterior thigh radicular pain or paresthesias     She takes Aleve 2 tablets twice a day that is better than the meloxicam she has been on the past.  She also is on glucosamine and CVD or oil.  Steroid in synthetic injections described above.  She has done home exercise conditioning program that she printed out from the Internet for his knee arthritis. She wears a brace if she is going to be standing for prolonged periods of time. No assistive devices.  She can walk about a miles she rides her bike 3 miles a day.  She says she has difficulty when she 1st gets up after sitting she feels like she limps when she walks but once she gets going she does okay     She is a .  She has a  at home     No significant complicating medical history     Objective:   Body mass index is 24.62 kg/m².  Exam:  Slight limp and antalgic gait favoring the left knee.  Left knee  degree knee range of motion 10° valgus alignment which  does correct she is tender palpation predominantly lateral but also medial patellofemoral joint lines with mild-to-moderate effusion knee stable anterior-posterior varus and valgus stresses with flexion contracture but no extensor lag.  No groin pain with active straight leg raise no pain with passive range of motion of her hip joint.      Right total knee replacement incision well healed good range of motion stability        Imaging:  Indication:  Left knee pain  Exam Ordered: Radiographs of the left knee include a standing anteroposterior view, a standing posterioanterior view, a lateral view in full flexion, and a sunrise view  Details of Examination: Exam shows evidence of joint space narrowing, osteophyte formation, and subchondral sclerosis, all consistent with degenerative arthritis of the knee.  No other significant findings are noted.  Impression:  Degenerative Arthritis, Left Knee    Indication:  Exam status post right total knee arthroplasty  Exam Ordered: Radiographs of the right knee include a standing anteroposterior view, a lateral view in full flexion, and a sunrise view  Details of Examination: Todays exam show a well fixed, well positioned total knee arthroplasty with no evidence of wear, osteolysis, or loosening.  Impression:  Status post right total knee arthroplasty, implant in good position with no abnormality    L knee Klg4 valgus arthritis, severe, bone on bone      Assessment:       ICD-10-CM ICD-9-CM   1. Primary osteoarthritis of left knee  M17.12 715.16   2. Status post right knee replacement  Z96.651 V43.65      R TKA 6/8/20     Plan:       The above findings were discussed with patient length. We discussed the risks of conservative versus surgical management knee arthritis. Conservative management consisting of anti-inflammatory medications, glucosamine/chondroitin sulfate, weight loss, physical therapy, activity modification, as well as injections (lubricant versus corticosteroid) was  discussed at length. At this point considering the patient's level of activity, pain, and radiographic findings I recommend TKA    This patient has significant symptoms in their knee that are affecting their quality of life and daily activities.  They have tried non-operative treatment including analgesics, an exercise program, and activity modification, but the symptoms have persisted. I believe they make a good candidate for knee arthroplasty.     Implants:   Company: [place]  System: [employer]  PS  universal tray  conventional poly       CT scan: [facility] protocol for operative planning   Additional discussion about pin sites: risk of wound healing issues and fracture      DVT prophylaxis: ASA 81mg BID x1 month    Dispo: outpatient PT    Admission status: Outpatient/23hr OBS    Location: [place]    L knee CSI today    Tentative plan for L TKA 2024, she will call to schedule  [facility] TKA - had hard early post op course with R knee but much of this due to severe valgus/flexion contracture    [facility] info today    F/u for pre-op visit      Orders Placed This Encounter   Procedures    Large Joint Aspiration/Injection: L knee     This order was created via procedure documentation             Past Medical History:   Diagnosis Date    Arthritis     History of adenomatous polyp of colon     Joint pain     Pneumonia     during college     Tendonitis of ankle or foot     Trouble in sleeping     Urinary incontinence        Past Surgical History:   Procedure Laterality Date     SECTION          COLONOSCOPY N/A 2018    Procedure: COLONOSCOPY;  Surgeon: Jim Borges MD;  Location: [address];  Service: Endoscopy;  Laterality: N/A;    hemorrhiodectomy      tendon surgery L foot      TOTAL KNEE ARTHROPLASTY Right 2020    Procedure: ARTHROPLASTY, KNEE, TOTAL-DEPUY-SIGMA/DEPUY-TC3/MBT back up;  Surgeon: Zack Patton III, MD;  Location: [facility];  Service: Orthopedics;  Laterality: Right;        Family History   Problem Relation Age of Onset    Heart disease Mother 78        MI    Parkinsonism Father     No Known Problems Daughter     Melanoma Neg Hx     Psoriasis Neg Hx     Lupus Neg Hx     Eczema Neg Hx     Acne Neg Hx        Social History     Socioeconomic History    Marital status:     Number of children: 1   Occupational History    Occupation: jeweler   Tobacco Use    Smoking status: Never    Smokeless tobacco: Never   Substance and Sexual Activity    Alcohol use: Yes     Alcohol/week: 7.0 standard drinks of alcohol     Types: 7 Glasses of wine per week     Comment: 5 oz of wine nightly    Drug use: No    Sexual activity: Yes     Partners: Male   Other Topics Concern    Are you pregnant or think you may be? No    Breast-feeding No   Social History Narrative    Biking, walking with dog, but limits after knee replacement.        Bikes 60 min most days.     Social Determinants of Health     Financial Resource Strain: Low Risk  (9/13/2023)    Overall Financial Resource Strain (CARDIA)     Difficulty of Paying Living Expenses: Not hard at all   Food Insecurity: No Food Insecurity (9/13/2023)    Hunger Vital Sign     Worried About Running Out of Food in the Last Year: Never true     Ran Out of Food in the Last Year: Never true   Transportation Needs: No Transportation Needs (9/13/2023)    PRAPARE - Transportation     Lack of Transportation (Medical): No     Lack of Transportation (Non-Medical): No   Physical Activity: Sufficiently Active (9/13/2023)    Exercise Vital Sign     Days of Exercise per Week: 7 days     Minutes of Exercise per Session: 50 min   Stress: No Stress Concern Present (9/13/2023)    [place] [place] [employer] - Occupational Stress Questionnaire     Feeling of Stress : Not at all   Social Connections: Moderately Integrated (9/13/2023)    Social Connection and Isolation Panel [NHANES]     Frequency of Communication with Friends and Family: More than three times a  week     Frequency of Social Gatherings with Friends and Family: More than three times a week     Attends Sabianist Services: Never     Active Member of Clubs or Organizations: Yes     Attends Club or Organization Meetings: More than 4 times per year     Marital Status:    Housing Stability: Unknown (9/13/2023)    Housing Stability Vital Sign     Unable to Pay for Housing in the Last Year: No     Unstable Housing in the Last Year: No

## 2023-09-26 NOTE — PROCEDURES
Large Joint Aspiration/Injection: L knee    Date/Time: 9/26/2023 1:00 PM    Performed by: Zack Patton III, MD  Authorized by: Zack Patton III, MD    Consent Done?:  Yes (Verbal)  Indications:  Pain  Timeout: prior to procedure the correct patient, procedure, and site was verified    Prep: patient was prepped and draped in usual sterile fashion      Local anesthesia used?: Yes    Local anesthetic:  Lidocaine 1% without epinephrine  Anesthetic total (ml):  5      Details:  Needle Size:  21 G  Location:  Knee  Site:  L knee  Medications:  40 mg triamcinolone acetonide 40 mg/mL  Patient tolerance:  Patient tolerated the procedure well with no immediate complications

## 2023-09-26 NOTE — PROGRESS NOTES
Injection Information    Triamcinolone Acetonide Injectable Suspension (40mg/mL)  Lot Number: [id number]  Expiration Date: 1/31/2025

## 2023-10-05 ENCOUNTER — PATIENT MESSAGE (OUTPATIENT)
Dept: ORTHOPEDICS | Facility: CLINIC | Age: 78
End: 2023-10-05
Payer: MEDICARE

## 2023-10-13 DIAGNOSIS — M17.12 PRIMARY OSTEOARTHRITIS OF LEFT KNEE: Primary | ICD-10-CM

## 2023-10-30 ENCOUNTER — HOSPITAL ENCOUNTER (OUTPATIENT)
Dept: RADIOLOGY | Facility: HOSPITAL | Age: 78
Discharge: HOME | End: 2023-10-30
Attending: INTERNAL MEDICINE
Payer: MEDICARE

## 2023-10-30 VITALS — WEIGHT: 174 LBS | HEIGHT: 70 IN | BODY MASS INDEX: 24.91 KG/M2

## 2023-10-30 DIAGNOSIS — Z12.31 ENCOUNTER FOR SCREENING MAMMOGRAM FOR BREAST CANCER: ICD-10-CM

## 2023-10-30 PROCEDURE — 77067 SCR MAMMO BI INCL CAD: CPT | Mod: TC

## 2023-10-30 PROCEDURE — 77067 MAMMO DIGITAL SCREENING BILAT WITH TOMO: ICD-10-PCS | Mod: 26,,, | Performed by: RADIOLOGY

## 2023-10-30 PROCEDURE — 77067 SCR MAMMO BI INCL CAD: CPT | Mod: 26,,, | Performed by: RADIOLOGY

## 2023-10-30 PROCEDURE — 77063 BREAST TOMOSYNTHESIS BI: CPT | Mod: 26,,, | Performed by: RADIOLOGY

## 2023-10-30 PROCEDURE — 77063 MAMMO DIGITAL SCREENING BILAT WITH TOMO: ICD-10-PCS | Mod: 26,,, | Performed by: RADIOLOGY

## 2023-11-01 ENCOUNTER — PATIENT MESSAGE (OUTPATIENT)
Dept: INTERNAL MEDICINE | Facility: CLINIC | Age: 78
End: 2023-11-01
Payer: MEDICARE

## 2023-11-01 NOTE — TELEPHONE ENCOUNTER
Slot held sent msg to advanced schedulers group and responded to portal msg informing pt os appt [id number] BP check 11/02/23 3:30 Isabelle Garcia PA-C., MD Physician Signed2:15 PM     Delete     Copy     Pls off virtual or in person visit tomorrow

## 2023-11-02 ENCOUNTER — OFFICE VISIT (OUTPATIENT)
Dept: INTERNAL MEDICINE | Facility: CLINIC | Age: 78
End: 2023-11-02
Payer: MEDICARE

## 2023-11-02 VITALS
HEART RATE: 64 BPM | WEIGHT: 173.94 LBS | HEIGHT: 70 IN | OXYGEN SATURATION: 99 % | SYSTOLIC BLOOD PRESSURE: 142 MMHG | DIASTOLIC BLOOD PRESSURE: 70 MMHG | BODY MASS INDEX: 24.9 KG/M2

## 2023-11-02 DIAGNOSIS — R20.8 OTHER DISTURBANCES OF SKIN SENSATION: ICD-10-CM

## 2023-11-02 DIAGNOSIS — D75.89 MACROCYTOSIS WITHOUT ANEMIA: ICD-10-CM

## 2023-11-02 DIAGNOSIS — Z00.00 ANNUAL PHYSICAL EXAM: Primary | ICD-10-CM

## 2023-11-02 DIAGNOSIS — I10 ESSENTIAL HYPERTENSION: ICD-10-CM

## 2023-11-02 PROCEDURE — 99397 PR PREVENTIVE VISIT,EST,65 & OVER: ICD-10-PCS | Mod: HCNC,S$GLB,, | Performed by: INTERNAL MEDICINE

## 2023-11-02 PROCEDURE — 1101F PR PT FALLS ASSESS DOC 0-1 FALLS W/OUT INJ PAST YR: ICD-10-PCS | Mod: HCNC,CPTII,S$GLB, | Performed by: INTERNAL MEDICINE

## 2023-11-02 PROCEDURE — 3078F PR MOST RECENT DIASTOLIC BLOOD PRESSURE < 80 MM HG: ICD-10-PCS | Mod: HCNC,CPTII,S$GLB, | Performed by: INTERNAL MEDICINE

## 2023-11-02 PROCEDURE — 3288F FALL RISK ASSESSMENT DOCD: CPT | Mod: HCNC,CPTII,S$GLB, | Performed by: INTERNAL MEDICINE

## 2023-11-02 PROCEDURE — 3288F PR FALLS RISK ASSESSMENT DOCUMENTED: ICD-10-PCS | Mod: HCNC,CPTII,S$GLB, | Performed by: INTERNAL MEDICINE

## 2023-11-02 PROCEDURE — 1126F AMNT PAIN NOTED NONE PRSNT: CPT | Mod: HCNC,CPTII,S$GLB, | Performed by: INTERNAL MEDICINE

## 2023-11-02 PROCEDURE — 1126F PR PAIN SEVERITY QUANTIFIED, NO PAIN PRESENT: ICD-10-PCS | Mod: HCNC,CPTII,S$GLB, | Performed by: INTERNAL MEDICINE

## 2023-11-02 PROCEDURE — 99397 PER PM REEVAL EST PAT 65+ YR: CPT | Mod: HCNC,S$GLB,, | Performed by: INTERNAL MEDICINE

## 2023-11-02 PROCEDURE — 1101F PT FALLS ASSESS-DOCD LE1/YR: CPT | Mod: HCNC,CPTII,S$GLB, | Performed by: INTERNAL MEDICINE

## 2023-11-02 PROCEDURE — 3077F SYST BP >= 140 MM HG: CPT | Mod: HCNC,CPTII,S$GLB, | Performed by: INTERNAL MEDICINE

## 2023-11-02 PROCEDURE — 3077F PR MOST RECENT SYSTOLIC BLOOD PRESSURE >= 140 MM HG: ICD-10-PCS | Mod: HCNC,CPTII,S$GLB, | Performed by: INTERNAL MEDICINE

## 2023-11-02 PROCEDURE — 3078F DIAST BP <80 MM HG: CPT | Mod: HCNC,CPTII,S$GLB, | Performed by: INTERNAL MEDICINE

## 2023-11-02 PROCEDURE — 99999 PR PBB SHADOW E&M-EST. PATIENT-LVL IV: CPT | Mod: PBBFAC,HCNC,, | Performed by: INTERNAL MEDICINE

## 2023-11-02 PROCEDURE — 1159F PR MEDICATION LIST DOCUMENTED IN MEDICAL RECORD: ICD-10-PCS | Mod: HCNC,CPTII,S$GLB, | Performed by: INTERNAL MEDICINE

## 2023-11-02 PROCEDURE — 99999 PR PBB SHADOW E&M-EST. PATIENT-LVL IV: ICD-10-PCS | Mod: PBBFAC,HCNC,, | Performed by: INTERNAL MEDICINE

## 2023-11-02 PROCEDURE — 1159F MED LIST DOCD IN RCRD: CPT | Mod: HCNC,CPTII,S$GLB, | Performed by: INTERNAL MEDICINE

## 2023-11-02 RX ORDER — VALSARTAN 80 MG/1
80 TABLET ORAL DAILY
Qty: 90 TABLET | Refills: 0 | Status: SHIPPED | OUTPATIENT
Start: 2023-11-02 | End: 2024-03-11

## 2023-11-02 NOTE — PROGRESS NOTES
Subjective     Patient ID: Aurora Castle is a 78 y.o. female.    Chief Complaint: Annual Exam and Hypertension    Hypertension  Pertinent negatives include no chest pain, headaches or shortness of breath.     She has been monitoring bp , as bp was elevated at  visit- 184/94.  173/89 systolic  MO visit:  Sat. 10/21: 148/80  Mon 10/23: 138/86  Today 11/1: 148/88   When I took it first time today, it was 171/89    Biking regularly.    Takes aleve bid for knees.  To have 2nd TKR  in jan.    H/o hyperplastic polyps.  Normal scope in 2018.    Review of Systems   Constitutional:  Negative for fever and unexpected weight change.   HENT:  Negative for nasal congestion and postnasal drip.    Respiratory:  Negative for chest tightness, shortness of breath and wheezing.    Cardiovascular:  Negative for chest pain and leg swelling.   Gastrointestinal:  Negative for abdominal pain, anal bleeding, constipation, diarrhea, nausea and vomiting.   Genitourinary:  Negative for dysuria and urgency.   Integumentary:  Negative for rash.   Neurological:  Negative for headaches.   Psychiatric/Behavioral:  Negative for dysphoric mood and sleep disturbance. The patient is not nervous/anxious.           Objective     Physical Exam  Constitutional:       General: She is not in acute distress.     Appearance: She is well-developed.   HENT:      Head: Normocephalic and atraumatic.      Right Ear: External ear normal.      Left Ear: External ear normal.      Nose: Nose normal.   Eyes:      General: No scleral icterus.        Right eye: No discharge.         Left eye: No discharge.      Conjunctiva/sclera: Conjunctivae normal.      Pupils: Pupils are equal, round, and reactive to light.   Neck:      Thyroid: No thyromegaly.      Vascular: No JVD.   Cardiovascular:      Rate and Rhythm: Normal rate and regular rhythm.      Heart sounds: Normal heart sounds. No murmur heard.     No gallop.   Pulmonary:      Effort: Pulmonary effort is normal.  No respiratory distress.      Breath sounds: Normal breath sounds. No wheezing or rales.   Abdominal:      General: Bowel sounds are normal. There is no distension.      Palpations: Abdomen is soft. There is no mass.      Tenderness: There is no abdominal tenderness. There is no guarding or rebound.   Musculoskeletal:         General: No tenderness. Normal range of motion.      Cervical back: Normal range of motion and neck supple.   Lymphadenopathy:      Cervical: No cervical adenopathy.   Skin:     General: Skin is warm and dry.      Findings: No rash.   Neurological:      Mental Status: She is alert and oriented to person, place, and time.      Cranial Nerves: No cranial nerve deficit.      Coordination: Coordination normal.   Psychiatric:         Behavior: Behavior normal.         Thought Content: Thought content normal.         Judgment: Judgment normal.       154/78     Assessment and Plan     1. Annual physical exam    2. Essential hypertension  -     Comprehensive Metabolic Panel; Future; Expected date: 11/02/2023    3. Macrocytosis without anemia  -     Vitamin B12; Future; Expected date: 11/02/2023  -     CBC Without Differential; Future; Expected date: 11/02/2023    4. Other disturbances of skin sensation  -     Vitamin B12; Future; Expected date: 11/02/2023    Other orders  -     valsartan (DIOVAN) 80 MG tablet; Take 1 tablet (80 mg total) by mouth once daily.  Dispense: 90 tablet; Refill: 0             Covid and tdap  Rsv in 1 mo, when she returns.      Start Valsartan.     BP check 1 mo with Romina.  Bring in BP machine.      No follow-ups on file.

## 2023-11-20 ENCOUNTER — PATIENT MESSAGE (OUTPATIENT)
Dept: ORTHOPEDICS | Facility: CLINIC | Age: 78
End: 2023-11-20
Payer: MEDICARE

## 2023-11-27 ENCOUNTER — TELEPHONE (OUTPATIENT)
Dept: ORTHOPEDICS | Facility: CLINIC | Age: 78
End: 2023-11-27
Payer: MEDICARE

## 2023-11-27 NOTE — TELEPHONE ENCOUNTER
----- Message from Toño Sanz sent at 11/27/2023  9:05 AM CST -----  Regarding: UNABLE TO CONTACT  GOOD MORNING,     WE HAVE MADE SEVERAL ATTEMPTS TO CONTACT PATIENT TO SCHEDULE POST OP THERAPY. MAY YOU PLEASE ASSIST WITH CONTACTING?    WE CAN BE REACHED [phone]

## 2023-11-27 NOTE — TELEPHONE ENCOUNTER
I called and spoke to the patient. The patient stated that she will call to schedule with PT today.    ----- Message from Toño Sanz sent at 11/27/2023  9:05 AM CST -----  Regarding: UNABLE TO CONTACT  GOOD MORNING,     WE HAVE MADE SEVERAL ATTEMPTS TO CONTACT PATIENT TO SCHEDULE POST OP THERAPY. MAY YOU PLEASE ASSIST WITH CONTACTING?    WE CAN BE REACHED [phone]

## 2023-11-29 DIAGNOSIS — M17.12 PRIMARY OSTEOARTHRITIS OF LEFT KNEE: Primary | ICD-10-CM

## 2023-11-29 DIAGNOSIS — Z96.652 STATUS POST LEFT KNEE REPLACEMENT: ICD-10-CM

## 2023-11-30 ENCOUNTER — TELEPHONE (OUTPATIENT)
Dept: ORTHOPEDICS | Facility: CLINIC | Age: 78
End: 2023-11-30
Payer: MEDICARE

## 2023-11-30 DIAGNOSIS — M17.12 PRIMARY OSTEOARTHRITIS OF LEFT KNEE: Primary | ICD-10-CM

## 2023-11-30 NOTE — TELEPHONE ENCOUNTER
Patient sent a [facility] message stating that she would like to go to [place] for her prehab and post-op therapy. I told her I would put the orders in and will fax it to them. The patient verbalized understanding and has no further questions.

## 2023-12-11 ENCOUNTER — TELEPHONE (OUTPATIENT)
Dept: PREADMISSION TESTING | Facility: HOSPITAL | Age: 78
End: 2023-12-11
Payer: MEDICARE

## 2023-12-11 DIAGNOSIS — Z01.818 PRE-OP TESTING: Primary | ICD-10-CM

## 2023-12-11 DIAGNOSIS — M79.609 PAIN IN EXTREMITY, UNSPECIFIED EXTREMITY: ICD-10-CM

## 2023-12-11 NOTE — ANESTHESIA PAT ROS NOTE
12/11/2023  Aurora Castle is a 78 y.o., female.      Pre-op Assessment          Review of Systems           Anesthesia Assessment: Preoperative EQUATION    Planned Procedure: Procedure(s) (LRB):  ARTHROPLASTY, KNEE, TOTAL, USING COMPUTER-ASSISTED NAVIGATION: GAGANDEEP: LEFT: NICHOLAS - TRIATHALON (Left)  Requested Anesthesia Type:Regional  Surgeon: Zack Patton III, MD  Service: Orthopedics  Known or anticipated Date of Surgery:1/8/2024    Surgeon notes: reviewed    Electronic QUestionnaire Assessment completed via nurse interview with patient.        Triage considerations:     The patient has no apparent active cardiac condition (No unstable coronary Syndrome such as severe unstable angina or recent [<1 month] myocardial infarction, decompensated CHF, severe valvular   disease or significant arrhythmia)    Previous anesthesia records:GETA, MAC, CSE, and No problems  6/8/20 ARTHROPLASTY, KNEE, TOTAL-DEPUY-SIGMA/DEPUY-TC3/MBT back up (Right: Knee)   Airway/Jaw/Neck:  Airway Findings: Mouth Opening: Normal Tongue: Normal  Jaw/Neck Findings:  Neck ROM: Normal ROM      Last PCP note: within 3 months , within [facility] , Dr. Epstein    Subspecialty notes: Dermatology, Ortho    Other important co-morbidities: HTN and Anemia       Tests already available:  No recent tests.  11/28/22 CMP,  CBC   6/2/20 EKG  9/6/18 Lumbar XR              Instructions given. (See in Nurse's note)    Optimization:  Anesthesia Preop Clinic Assessment  Indicated POC    Medical Opinion Indicated       Sub-specialist consult indicated:   [facility] NP      Plan:    Testing:  CMP, EKG, Hematology Profile, and PT/INR   Pre-anesthesia  visit       Visit focus: possible regional anesthesia and/or nerve block      Consultation: PCC NP for medical and anesthesia optimization     Patient  has previously scheduled Medical Appointment:  12/21    Navigation: Tests Scheduled.              Consults scheduled.             Results will be tracked by Preop Clinic.      12/21/23 Patient is optimized     I spent a total of 36 minutes on the day of the visit.This includes face to face time and non-face to face time preparing to see the patient (eg, review of tests), obtaining and/or reviewing separately obtained history, documenting clinical information in the electronic or other health record, independently interpreting results and communicating results to the patient/family/caregiver, or care coordinator.        Arvind Palma NP  Perioperative Medicine  [facility]   Pager [phone]

## 2023-12-11 NOTE — TELEPHONE ENCOUNTER
----- Message from Nicolasa Galindo RN sent at 12/11/2023  8:17 AM CST -----  (1/8) Please schedule EKG and labs (CBC, CMP, PT/INR).  Thanks,  Nicolasa

## 2023-12-14 ENCOUNTER — PATIENT MESSAGE (OUTPATIENT)
Dept: INTERNAL MEDICINE | Facility: CLINIC | Age: 78
End: 2023-12-14
Payer: MEDICARE

## 2023-12-14 ENCOUNTER — PATIENT MESSAGE (OUTPATIENT)
Dept: ORTHOPEDICS | Facility: CLINIC | Age: 78
End: 2023-12-14
Payer: MEDICARE

## 2023-12-20 ENCOUNTER — TELEPHONE (OUTPATIENT)
Dept: INTERNAL MEDICINE | Facility: CLINIC | Age: 78
End: 2023-12-20
Payer: MEDICARE

## 2023-12-21 ENCOUNTER — HOSPITAL ENCOUNTER (OUTPATIENT)
Dept: RADIOLOGY | Facility: HOSPITAL | Age: 78
Discharge: HOME | End: 2023-12-21
Attending: ORTHOPAEDIC SURGERY
Payer: MEDICARE

## 2023-12-21 ENCOUNTER — OFFICE VISIT (OUTPATIENT)
Dept: INTERNAL MEDICINE | Facility: CLINIC | Age: 78
End: 2023-12-21
Payer: MEDICARE

## 2023-12-21 ENCOUNTER — OFFICE VISIT (OUTPATIENT)
Dept: ORTHOPEDICS | Facility: CLINIC | Age: 78
End: 2023-12-21
Payer: MEDICARE

## 2023-12-21 ENCOUNTER — HOSPITAL ENCOUNTER (OUTPATIENT)
Dept: CARDIOLOGY | Facility: CLINIC | Age: 78
Discharge: HOME | End: 2023-12-21
Payer: MEDICARE

## 2023-12-21 VITALS
BODY MASS INDEX: 25.45 KG/M2 | WEIGHT: 177.81 LBS | WEIGHT: 177 LBS | HEIGHT: 70 IN | HEIGHT: 70 IN | BODY MASS INDEX: 25.34 KG/M2

## 2023-12-21 VITALS
SYSTOLIC BLOOD PRESSURE: 150 MMHG | HEIGHT: 70 IN | TEMPERATURE: 98 F | BODY MASS INDEX: 25.25 KG/M2 | OXYGEN SATURATION: 97 % | DIASTOLIC BLOOD PRESSURE: 72 MMHG | HEART RATE: 55 BPM | WEIGHT: 176.38 LBS

## 2023-12-21 DIAGNOSIS — M17.12 PRIMARY OSTEOARTHRITIS OF LEFT KNEE: Primary | ICD-10-CM

## 2023-12-21 DIAGNOSIS — M17.12 PRIMARY OSTEOARTHRITIS OF LEFT KNEE: ICD-10-CM

## 2023-12-21 DIAGNOSIS — N39.46 URINARY INCONTINENCE, MIXED: ICD-10-CM

## 2023-12-21 DIAGNOSIS — Z96.652 S/P TOTAL KNEE REPLACEMENT, LEFT: ICD-10-CM

## 2023-12-21 DIAGNOSIS — N81.4 UTEROVAGINAL PROLAPSE: ICD-10-CM

## 2023-12-21 DIAGNOSIS — Z01.818 PRE-OP TESTING: ICD-10-CM

## 2023-12-21 DIAGNOSIS — G47.00 INSOMNIA, UNSPECIFIED TYPE: ICD-10-CM

## 2023-12-21 DIAGNOSIS — M17.12 ARTHRITIS OF LEFT KNEE: ICD-10-CM

## 2023-12-21 DIAGNOSIS — I10 HYPERTENSION, UNSPECIFIED TYPE: Primary | ICD-10-CM

## 2023-12-21 DIAGNOSIS — R71.8 ABNORMAL RBC: ICD-10-CM

## 2023-12-21 PROCEDURE — 99999 PR PBB SHADOW E&M-EST. PATIENT-LVL III: CPT | Mod: PBBFAC,,,

## 2023-12-21 PROCEDURE — 3288F PR FALLS RISK ASSESSMENT DOCUMENTED: ICD-10-PCS | Mod: CPTII,S$GLB,, | Performed by: ORTHOPAEDIC SURGERY

## 2023-12-21 PROCEDURE — 99999 PR PBB SHADOW E&M-EST. PATIENT-LVL III: ICD-10-PCS | Mod: PBBFAC,,,

## 2023-12-21 PROCEDURE — 1101F PT FALLS ASSESS-DOCD LE1/YR: CPT | Mod: CPTII,S$GLB,, | Performed by: ORTHOPAEDIC SURGERY

## 2023-12-21 PROCEDURE — 1160F PR REVIEW ALL MEDS BY PRESCRIBER/CLIN PHARMACIST DOCUMENTED: ICD-10-PCS | Mod: CPTII,S$GLB,,

## 2023-12-21 PROCEDURE — 3077F PR MOST RECENT SYSTOLIC BLOOD PRESSURE >= 140 MM HG: ICD-10-PCS | Mod: CPTII,S$GLB,, | Performed by: NURSE PRACTITIONER

## 2023-12-21 PROCEDURE — 99999 PR PBB SHADOW E&M-EST. PATIENT-LVL IV: ICD-10-PCS | Mod: PBBFAC,,, | Performed by: NURSE PRACTITIONER

## 2023-12-21 PROCEDURE — 1160F RVW MEDS BY RX/DR IN RCRD: CPT | Mod: CPTII,S$GLB,,

## 2023-12-21 PROCEDURE — 73700 CT LOWER EXTREMITY W/O DYE: CPT | Mod: TC,LT

## 2023-12-21 PROCEDURE — 3077F SYST BP >= 140 MM HG: CPT | Mod: CPTII,S$GLB,, | Performed by: NURSE PRACTITIONER

## 2023-12-21 PROCEDURE — 93005 EKG 12-LEAD: ICD-10-PCS | Mod: S$GLB,,, | Performed by: ANESTHESIOLOGY

## 2023-12-21 PROCEDURE — 99999 PR PBB SHADOW E&M-EST. PATIENT-LVL IV: CPT | Mod: PBBFAC,,, | Performed by: NURSE PRACTITIONER

## 2023-12-21 PROCEDURE — 3288F FALL RISK ASSESSMENT DOCD: CPT | Mod: CPTII,S$GLB,, | Performed by: ORTHOPAEDIC SURGERY

## 2023-12-21 PROCEDURE — 1101F PR PT FALLS ASSESS DOC 0-1 FALLS W/OUT INJ PAST YR: ICD-10-PCS | Mod: CPTII,S$GLB,, | Performed by: ORTHOPAEDIC SURGERY

## 2023-12-21 PROCEDURE — 99214 PR OFFICE/OUTPT VISIT, EST, LEVL IV, 30-39 MIN: ICD-10-PCS | Mod: S$GLB,,,

## 2023-12-21 PROCEDURE — 1125F AMNT PAIN NOTED PAIN PRSNT: CPT | Mod: CPTII,S$GLB,, | Performed by: ORTHOPAEDIC SURGERY

## 2023-12-21 PROCEDURE — 73560 X-RAY EXAM OF KNEE 1 OR 2: CPT | Mod: 26,LT,, | Performed by: RADIOLOGY

## 2023-12-21 PROCEDURE — 99214 OFFICE O/P EST MOD 30 MIN: CPT | Mod: S$GLB,,,

## 2023-12-21 PROCEDURE — 1125F PR PAIN SEVERITY QUANTIFIED, PAIN PRESENT: ICD-10-PCS | Mod: CPTII,S$GLB,, | Performed by: NURSE PRACTITIONER

## 2023-12-21 PROCEDURE — 99024 POSTOP FOLLOW-UP VISIT: CPT | Mod: S$GLB,,, | Performed by: ORTHOPAEDIC SURGERY

## 2023-12-21 PROCEDURE — 3078F DIAST BP <80 MM HG: CPT | Mod: CPTII,S$GLB,, | Performed by: NURSE PRACTITIONER

## 2023-12-21 PROCEDURE — 73560 X-RAY EXAM OF KNEE 1 OR 2: CPT | Mod: TC,LT

## 2023-12-21 PROCEDURE — 99024 PR POST-OP FOLLOW-UP VISIT: ICD-10-PCS | Mod: S$GLB,,, | Performed by: ORTHOPAEDIC SURGERY

## 2023-12-21 PROCEDURE — 3078F PR MOST RECENT DIASTOLIC BLOOD PRESSURE < 80 MM HG: ICD-10-PCS | Mod: CPTII,S$GLB,, | Performed by: NURSE PRACTITIONER

## 2023-12-21 PROCEDURE — 93010 ELECTROCARDIOGRAM REPORT: CPT | Mod: S$GLB,,, | Performed by: INTERNAL MEDICINE

## 2023-12-21 PROCEDURE — 99999 PR PBB SHADOW E&M-EST. PATIENT-LVL III: CPT | Mod: PBBFAC,,, | Performed by: ORTHOPAEDIC SURGERY

## 2023-12-21 PROCEDURE — 1159F MED LIST DOCD IN RCRD: CPT | Mod: CPTII,S$GLB,, | Performed by: ORTHOPAEDIC SURGERY

## 2023-12-21 PROCEDURE — 93005 ELECTROCARDIOGRAM TRACING: CPT | Mod: S$GLB,,, | Performed by: ANESTHESIOLOGY

## 2023-12-21 PROCEDURE — 1159F PR MEDICATION LIST DOCUMENTED IN MEDICAL RECORD: ICD-10-PCS | Mod: CPTII,S$GLB,, | Performed by: NURSE PRACTITIONER

## 2023-12-21 PROCEDURE — 1159F PR MEDICATION LIST DOCUMENTED IN MEDICAL RECORD: ICD-10-PCS | Mod: CPTII,S$GLB,, | Performed by: ORTHOPAEDIC SURGERY

## 2023-12-21 PROCEDURE — 73700 CT LOWER EXTREMITY W/O DYE: CPT | Mod: 26,LT,, | Performed by: INTERNAL MEDICINE

## 2023-12-21 PROCEDURE — 99999 PR PBB SHADOW E&M-EST. PATIENT-LVL III: ICD-10-PCS | Mod: PBBFAC,,, | Performed by: ORTHOPAEDIC SURGERY

## 2023-12-21 PROCEDURE — 99214 PR OFFICE/OUTPT VISIT, EST, LEVL IV, 30-39 MIN: ICD-10-PCS | Mod: S$GLB,,, | Performed by: NURSE PRACTITIONER

## 2023-12-21 PROCEDURE — 1125F AMNT PAIN NOTED PAIN PRSNT: CPT | Mod: CPTII,S$GLB,, | Performed by: NURSE PRACTITIONER

## 2023-12-21 PROCEDURE — 1159F MED LIST DOCD IN RCRD: CPT | Mod: CPTII,S$GLB,, | Performed by: NURSE PRACTITIONER

## 2023-12-21 PROCEDURE — 93010 EKG 12-LEAD: ICD-10-PCS | Mod: S$GLB,,, | Performed by: INTERNAL MEDICINE

## 2023-12-21 PROCEDURE — 73560 XR KNEE 1 OR 2 VIEW LEFT: ICD-10-PCS | Mod: 26,LT,, | Performed by: RADIOLOGY

## 2023-12-21 PROCEDURE — 99214 OFFICE O/P EST MOD 30 MIN: CPT | Mod: S$GLB,,, | Performed by: NURSE PRACTITIONER

## 2023-12-21 PROCEDURE — 73700 CT KNEE WITHOUT CONTRAST LEFT: ICD-10-PCS | Mod: 26,LT,, | Performed by: INTERNAL MEDICINE

## 2023-12-21 PROCEDURE — 1125F PR PAIN SEVERITY QUANTIFIED, PAIN PRESENT: ICD-10-PCS | Mod: CPTII,S$GLB,, | Performed by: ORTHOPAEDIC SURGERY

## 2023-12-21 RX ORDER — MUPIROCIN 20 MG/G
1 OINTMENT TOPICAL
Status: CANCELLED | OUTPATIENT
Start: 2023-12-21

## 2023-12-21 RX ORDER — ACETAMINOPHEN 500 MG
1000 TABLET ORAL
Status: CANCELLED | OUTPATIENT
Start: 2023-12-21

## 2023-12-21 RX ORDER — PREGABALIN 75 MG/1
75 CAPSULE ORAL
Status: CANCELLED | OUTPATIENT
Start: 2023-12-21

## 2023-12-21 RX ORDER — CELECOXIB 200 MG/1
200 CAPSULE ORAL DAILY
Status: CANCELLED | OUTPATIENT
Start: 2023-12-21

## 2023-12-21 RX ORDER — ACETAMINOPHEN 500 MG
1000 TABLET ORAL EVERY 6 HOURS
Status: CANCELLED | OUTPATIENT
Start: 2023-12-21

## 2023-12-21 RX ORDER — METHOCARBAMOL 750 MG/1
750 TABLET, FILM COATED ORAL 3 TIMES DAILY
Status: CANCELLED | OUTPATIENT
Start: 2023-12-21

## 2023-12-21 RX ORDER — FAMOTIDINE 20 MG/1
20 TABLET, FILM COATED ORAL 2 TIMES DAILY
Status: CANCELLED | OUTPATIENT
Start: 2023-12-21

## 2023-12-21 RX ORDER — PREGABALIN 75 MG/1
75 CAPSULE ORAL NIGHTLY
Status: CANCELLED | OUTPATIENT
Start: 2023-12-21

## 2023-12-21 RX ORDER — SODIUM CHLORIDE 9 MG/ML
INJECTION, SOLUTION INTRAVENOUS CONTINUOUS
Status: CANCELLED | OUTPATIENT
Start: 2023-12-21 | End: 2023-12-22

## 2023-12-21 RX ORDER — LIDOCAINE HYDROCHLORIDE 10 MG/ML
1 INJECTION, SOLUTION EPIDURAL; INFILTRATION; INTRACAUDAL; PERINEURAL
Status: CANCELLED | OUTPATIENT
Start: 2023-12-21

## 2023-12-21 RX ORDER — SODIUM CHLORIDE 0.9 % (FLUSH) 0.9 %
10 SYRINGE (ML) INJECTION
Status: CANCELLED | OUTPATIENT
Start: 2023-12-21

## 2023-12-21 RX ORDER — FENTANYL CITRATE 50 UG/ML
100 INJECTION, SOLUTION INTRAMUSCULAR; INTRAVENOUS
Status: CANCELLED | OUTPATIENT
Start: 2023-12-21 | End: 2023-12-22

## 2023-12-21 RX ORDER — ONDANSETRON 2 MG/ML
4 INJECTION INTRAMUSCULAR; INTRAVENOUS EVERY 8 HOURS PRN
Status: CANCELLED | OUTPATIENT
Start: 2023-12-21

## 2023-12-21 RX ORDER — NALOXONE HCL 0.4 MG/ML
0.02 VIAL (ML) INJECTION
Status: CANCELLED | OUTPATIENT
Start: 2023-12-21

## 2023-12-21 RX ORDER — CELECOXIB 200 MG/1
400 CAPSULE ORAL ONCE
Status: CANCELLED | OUTPATIENT
Start: 2023-12-21 | End: 2023-12-21

## 2023-12-21 RX ORDER — CEFAZOLIN SODIUM 2 G/50ML
2 SOLUTION INTRAVENOUS
Status: CANCELLED | OUTPATIENT
Start: 2023-12-21

## 2023-12-21 RX ORDER — PROCHLORPERAZINE EDISYLATE 5 MG/ML
5 INJECTION INTRAMUSCULAR; INTRAVENOUS EVERY 6 HOURS PRN
Status: CANCELLED | OUTPATIENT
Start: 2023-12-21

## 2023-12-21 RX ORDER — CEFAZOLIN SODIUM 2 G/50ML
2 SOLUTION INTRAVENOUS
Status: CANCELLED | OUTPATIENT
Start: 2023-12-21 | End: 2023-12-22

## 2023-12-21 RX ORDER — MORPHINE SULFATE 2 MG/ML
2 INJECTION, SOLUTION INTRAMUSCULAR; INTRAVENOUS
Status: CANCELLED | OUTPATIENT
Start: 2023-12-21

## 2023-12-21 RX ORDER — BISACODYL 10 MG
10 SUPPOSITORY, RECTAL RECTAL EVERY 12 HOURS PRN
Status: CANCELLED | OUTPATIENT
Start: 2023-12-21

## 2023-12-21 RX ORDER — AMOXICILLIN 250 MG
1 CAPSULE ORAL 2 TIMES DAILY
Status: CANCELLED | OUTPATIENT
Start: 2023-12-21

## 2023-12-21 RX ORDER — TALC
6 POWDER (GRAM) TOPICAL NIGHTLY PRN
Status: CANCELLED | OUTPATIENT
Start: 2023-12-21

## 2023-12-21 RX ORDER — MUPIROCIN 20 MG/G
1 OINTMENT TOPICAL 2 TIMES DAILY
Status: CANCELLED | OUTPATIENT
Start: 2023-12-21 | End: 2023-12-26

## 2023-12-21 RX ORDER — SODIUM CHLORIDE 9 MG/ML
INJECTION, SOLUTION INTRAVENOUS
Status: CANCELLED | OUTPATIENT
Start: 2023-12-21

## 2023-12-21 RX ORDER — FENTANYL CITRATE 50 UG/ML
25 INJECTION, SOLUTION INTRAMUSCULAR; INTRAVENOUS EVERY 5 MIN PRN
Status: CANCELLED | OUTPATIENT
Start: 2023-12-21

## 2023-12-21 RX ORDER — MIDAZOLAM HYDROCHLORIDE 1 MG/ML
4 INJECTION INTRAMUSCULAR; INTRAVENOUS
Status: CANCELLED | OUTPATIENT
Start: 2023-12-21 | End: 2023-12-22

## 2023-12-21 RX ORDER — NAPROXEN SODIUM 220 MG
440 TABLET ORAL 2 TIMES DAILY WITH MEALS
Status: ON HOLD | COMMUNITY
End: 2024-01-08 | Stop reason: HOSPADM

## 2023-12-21 RX ORDER — OXYCODONE HYDROCHLORIDE 5 MG/1
5 TABLET ORAL
Status: CANCELLED | OUTPATIENT
Start: 2023-12-21

## 2023-12-21 RX ORDER — ASPIRIN 81 MG/1
81 TABLET ORAL 2 TIMES DAILY
Status: CANCELLED | OUTPATIENT
Start: 2023-12-21

## 2023-12-21 RX ORDER — POLYETHYLENE GLYCOL 3350 17 G/17G
17 POWDER, FOR SOLUTION ORAL DAILY
Status: CANCELLED | OUTPATIENT
Start: 2023-12-21

## 2023-12-21 RX ORDER — OXYCODONE HYDROCHLORIDE 5 MG/1
10 TABLET ORAL
Status: CANCELLED | OUTPATIENT
Start: 2023-12-21

## 2023-12-21 NOTE — H&P
CC:  Left knee pain    Aurora Castle is a 78 y.o. female with history of Left knee pain. Pain is worse with activity and weight bearing.  Patient has experienced interference of activities of daily living due to decreased range of motion and an increase in joint pain and swelling.  Patient has failed non-operative treatment including NSAIDs, corticosteroid injections, viscosupplement injections, and activity modification.  Aurora Castle currently ambulates independently.     Relevant medical conditions of significance in perioperative period:  None     Given documented medical comorbidities including those listed above and based off of CMS criteria patient would meet inpatient admission status guidelines. This case has been approved as inpatient.     Past Medical History:   Diagnosis Date    Arthritis     History of adenomatous polyp of colon     Joint pain     Pneumonia     during college     Tendonitis of ankle or foot     Trouble in sleeping     Urinary incontinence        Past Surgical History:   Procedure Laterality Date     SECTION          COLONOSCOPY N/A 2018    Procedure: COLONOSCOPY;  Surgeon: Jim Borges MD;  Location: [facility] ([address] [place] [address]);  Service: Endoscopy;  Laterality: N/A;    hemorrhiodectomy      tendon surgery L foot      TOTAL KNEE ARTHROPLASTY Right 2020    Procedure: ARTHROPLASTY, KNEE, TOTAL-DEPUY-SIGMA/DEPUY-TC3/MBT back up;  Surgeon: Zack Patton III, MD;  Location: [facility];  Service: Orthopedics;  Laterality: Right;       Family History   Problem Relation Age of Onset    Stroke Mother     Heart disease Mother 78        MI    Parkinsonism Father     No Known Problems Daughter     Melanoma Neg Hx     Psoriasis Neg Hx     Lupus Neg Hx     Eczema Neg Hx     Acne Neg Hx        Review of patient's allergies indicates:   Allergen Reactions    Erythromycin Itching and Swelling     Eye swelling   Local eye ointment         Current Outpatient Medications:      ascorbic acid, vitamin C, (VITAMIN C) 1000 MG tablet, Take 1 tablet by mouth Daily. Hold medication one week prior to surgery, Disp: , Rfl:     BIOTIN ORAL, Take 5,000 mcg by mouth once daily. Hold medication one week prior to surgery, Disp: , Rfl:     cholecalciferol, vitamin D3, (VITAMIN D3) 5,000 unit Tab, Take 1 tablet by mouth once daily. (Patient taking differently: Take 5,000 Units by mouth once daily. Hold medication one week prior to surgery), Disp: , Rfl:     doxylamine succinate (UNISOM, DOXYLAMINE, ORAL), Take by mouth every evening. TAKES AT NIGHT, Disp: , Rfl:     fish oil-omega-3 fatty acids 300-1,000 mg capsule, Take 2 g by mouth once daily. Hold medication one week prior to surgery, Disp: , Rfl:     glucosamine-chondroit-vit C-Mn 934-865-95-2.5 mg Cap, Take 1 capsule by mouth once daily. Hold medication one week prior to surgery, Disp: , Rfl:     ibuprofen (ADVIL,MOTRIN) 600 MG tablet, Take 1,200 mg by mouth 2 (two) times daily., Disp: , Rfl:     MELATONIN ORAL, Take by mouth every evening. TAKES AT NIGHT, Disp: , Rfl:     MULTIVITAMIN WITH MINERALS (MULTIVITAMIN & MINERAL FORMULA) Tab, Take 1 tablet by mouth Daily. Hold medication one week prior to surgery, Disp: , Rfl:     valsartan (DIOVAN) 80 MG tablet, Take 1 tablet (80 mg total) by mouth once daily., Disp: 90 tablet, Rfl: 0    VIT C/VIT E/LUTEIN/MIN/OMEGA-3 (OCUVITE ORAL), Take 1 tablet by mouth once daily. Hold medication one week prior to surgery, Disp: , Rfl:     Review of Systems:  Constitutional: no fever or chills  Eyes: no visual changes  ENT: no nasal congestion or sore throat  Respiratory: no cough or shortness of breath  Cardiovascular: no chest pain or palpitations  Gastrointestinal: no nausea or vomiting, tolerating diet  Genitourinary: no hematuria or dysuria  Integument/Breast: no rash or pruritis  Hematologic/Lymphatic: no easy bruising or lymphadenopathy  Musculoskeletal: positive for knee pain  Neurological: no seizures or  "tremors  Behavioral/Psych: no auditory or visual hallucinations  Endocrine: no heat or cold intolerance    PE:  Ht 5' 10" (1.778 m)   Wt 80.3 kg (177 lb)   LMP  (LMP Unknown)   BMI 25.40 kg/m²   General: Pleasant, cooperative, NAD   Gait: antalgic  HEENT: NCAT, sclera nonicteric   Lungs: Respirations clear bilaterally; equal and unlabored.   CV: S1S2; 2+ bilateral upper and lower extremity pulses.   Skin: Intact throughout with no rashes, erythema, or lesions  Extremities: No LE edema,  no erythema or warmth of the skin in either lower extremity.    Left knee exam:  Knee Range of Motion:, pain with passive range of motion  Effusion:mild  Condition of skin:intact  Location of tenderness:Lateral joint line   Strength:normal  Stability: stable to testing    Hip Examination: painless PROM of hip     Radiographs: Radiographs reveal advanced degenerative changes including subchondral cyst formation, subchondral sclerosis, osteophyte formation, joint space narrowing.     Knee Alignment:  Significiant valgus    Diagnosis: Primary osteoarthritis Left knee    Plan: Left total knee arthroplasty    Due to the serious nature of total joint infection and the high prevalence of community acquired MRSA, vancomycin will be used perioperatively.            "

## 2023-12-21 NOTE — HPI
This is a 78 y.o. female  who presents today for a preoperative evaluation in preparation for left knee replacement  Surgery is indicated for  left knee osteoarthritis   .   Patient is new to me.    The history has been obtained by speaking with the patient and reviewing the electronic medical record and/or outside health information. Significant health conditions for the perioperative period are discussed below in assessment and plan.     Patient reports current health status to be Excellent.  Denies any new symptoms before surgery.

## 2023-12-21 NOTE — ASSESSMENT & PLAN NOTE
Current BP  150/72 today.    Taking: VAlsartan  Patient reports home BP readings of:      Lifestyle changes to reduce systolic BP:  Smoking cessation; exercise 30 minutes per day,  5 days per week or 150 minutes weekly; sodium reduction and avoidance of high salt foods such as processed meats, frozen meals and  fast foods.   Keeping a healthy weight/BMI can help with better BP control    BP acceptable for surgery. I recommend monitoring BP during perioperative period as uncontrolled pain can elevate blood pressure.

## 2023-12-21 NOTE — H&P (VIEW-ONLY)
CC:  Left knee pain    Auroar Castle is a 78 y.o. female with history of Left knee pain. Pain is worse with activity and weight bearing.  Patient has experienced interference of activities of daily living due to decreased range of motion and an increase in joint pain and swelling.  Patient has failed non-operative treatment including NSAIDs, corticosteroid injections, viscosupplement injections, and activity modification.  Aurora Castle currently ambulates independently.     Relevant medical conditions of significance in perioperative period:  None     Given documented medical comorbidities including those listed above and based off of CMS criteria patient would meet inpatient admission status guidelines. This case has been approved as inpatient.     Past Medical History:   Diagnosis Date    Arthritis     History of adenomatous polyp of colon     Joint pain     Pneumonia     during college     Tendonitis of ankle or foot     Trouble in sleeping     Urinary incontinence        Past Surgical History:   Procedure Laterality Date     SECTION          COLONOSCOPY N/A 2018    Procedure: COLONOSCOPY;  Surgeon: Jim Borges MD;  Location: [facility] ([address] [place] [address]);  Service: Endoscopy;  Laterality: N/A;    hemorrhiodectomy      tendon surgery L foot      TOTAL KNEE ARTHROPLASTY Right 2020    Procedure: ARTHROPLASTY, KNEE, TOTAL-DEPUY-SIGMA/DEPUY-TC3/MBT back up;  Surgeon: Zack Patton III, MD;  Location: [facility];  Service: Orthopedics;  Laterality: Right;       Family History   Problem Relation Age of Onset    Stroke Mother     Heart disease Mother 78        MI    Parkinsonism Father     No Known Problems Daughter     Melanoma Neg Hx     Psoriasis Neg Hx     Lupus Neg Hx     Eczema Neg Hx     Acne Neg Hx        Review of patient's allergies indicates:   Allergen Reactions    Erythromycin Itching and Swelling     Eye swelling   Local eye ointment         Current Outpatient Medications:      ascorbic acid, vitamin C, (VITAMIN C) 1000 MG tablet, Take 1 tablet by mouth Daily. Hold medication one week prior to surgery, Disp: , Rfl:     BIOTIN ORAL, Take 5,000 mcg by mouth once daily. Hold medication one week prior to surgery, Disp: , Rfl:     cholecalciferol, vitamin D3, (VITAMIN D3) 5,000 unit Tab, Take 1 tablet by mouth once daily. (Patient taking differently: Take 5,000 Units by mouth once daily. Hold medication one week prior to surgery), Disp: , Rfl:     doxylamine succinate (UNISOM, DOXYLAMINE, ORAL), Take by mouth every evening. TAKES AT NIGHT, Disp: , Rfl:     fish oil-omega-3 fatty acids 300-1,000 mg capsule, Take 2 g by mouth once daily. Hold medication one week prior to surgery, Disp: , Rfl:     glucosamine-chondroit-vit C-Mn 065-685-67-2.5 mg Cap, Take 1 capsule by mouth once daily. Hold medication one week prior to surgery, Disp: , Rfl:     ibuprofen (ADVIL,MOTRIN) 600 MG tablet, Take 1,200 mg by mouth 2 (two) times daily., Disp: , Rfl:     MELATONIN ORAL, Take by mouth every evening. TAKES AT NIGHT, Disp: , Rfl:     MULTIVITAMIN WITH MINERALS (MULTIVITAMIN & MINERAL FORMULA) Tab, Take 1 tablet by mouth Daily. Hold medication one week prior to surgery, Disp: , Rfl:     valsartan (DIOVAN) 80 MG tablet, Take 1 tablet (80 mg total) by mouth once daily., Disp: 90 tablet, Rfl: 0    VIT C/VIT E/LUTEIN/MIN/OMEGA-3 (OCUVITE ORAL), Take 1 tablet by mouth once daily. Hold medication one week prior to surgery, Disp: , Rfl:     Review of Systems:  Constitutional: no fever or chills  Eyes: no visual changes  ENT: no nasal congestion or sore throat  Respiratory: no cough or shortness of breath  Cardiovascular: no chest pain or palpitations  Gastrointestinal: no nausea or vomiting, tolerating diet  Genitourinary: no hematuria or dysuria  Integument/Breast: no rash or pruritis  Hematologic/Lymphatic: no easy bruising or lymphadenopathy  Musculoskeletal: positive for knee pain  Neurological: no seizures or  "tremors  Behavioral/Psych: no auditory or visual hallucinations  Endocrine: no heat or cold intolerance    PE:  Ht 5' 10" (1.778 m)   Wt 80.3 kg (177 lb)   LMP  (LMP Unknown)   BMI 25.40 kg/m²   General: Pleasant, cooperative, NAD   Gait: antalgic  HEENT: NCAT, sclera nonicteric   Lungs: Respirations clear bilaterally; equal and unlabored.   CV: S1S2; 2+ bilateral upper and lower extremity pulses.   Skin: Intact throughout with no rashes, erythema, or lesions  Extremities: No LE edema,  no erythema or warmth of the skin in either lower extremity.    Left knee exam:  Knee Range of Motion:, pain with passive range of motion  Effusion:mild  Condition of skin:intact  Location of tenderness:Lateral joint line   Strength:normal  Stability: stable to testing    Hip Examination: painless PROM of hip     Radiographs: Radiographs reveal advanced degenerative changes including subchondral cyst formation, subchondral sclerosis, osteophyte formation, joint space narrowing.     Knee Alignment:  Significiant valgus    Diagnosis: Primary osteoarthritis Left knee    Plan: Left total knee arthroplasty    Due to the serious nature of total joint infection and the high prevalence of community acquired MRSA, vancomycin will be used perioperatively.            "

## 2023-12-21 NOTE — ASSESSMENT & PLAN NOTE
Component      Latest Ref Rn 12/21/2023   RBC      4.00 - 5.40 M/uL 3.95 (L)    Hemoglobin      12.0 - 16.0 g/dL 13.2    Hematocrit      37.0 - 48.5 % 37.5    MCV      82 - 98 fL 95    MCH      27.0 - 31.0 pg 33.4 (H)    MCHC      32.0 - 36.0 g/dL 35.2    RDW      11.5 - 14.5 % 13.0       Legend:  (L) Low  (H) High

## 2023-12-21 NOTE — DISCHARGE INSTRUCTIONS
Your surgery has been scheduled for:_____1/8/24_____________________________________    You should report to:  ____[facility], located on the [place] side of the first floor of the           [facility] ([phone])  ____[facility], located on the [facility] side of the            Second floor of the [facility] [phone])  ____3rd Floor [facility] located on the [facility] side of the [facility] [phone]  _X___[facility]: located at [address] [facility] [place] [address]. [address]     Please Note   Tell your doctor if you take Aspirin, products containing Aspirin, herbal medications  or blood thinners, such as Coumadin, Ticlid, or Plavix.  (Consult your provider regarding holding or stopping before surgery).  Arrange for someone to drive you home following surgery.  You will not be allowed to leave the surgical facility alone or drive yourself home following sedation and anesthesia.    Before Surgery  Stop taking all herbal medications, vitamins, and supplements 7 days prior to surgery  No Motrin/Advil (Ibuprofen) 7 days before surgery  No Aleve (Naproxen) 7 days before surgery  Stop Taking Asprin, products containing Aspirin __7___days before surgery   No Goody's/BC Powder 7 days before surgery  Refrain from drinking alcoholic beverages for 24 hours before and after surgery  Stop or limit smoking at least 24 hours prior to surgery  You may take Tylenol for pain    Night before Surgery  Do not eat or drink after midnight  Take a shower or bath (shower is recommended).  Bathe with Hibiclens soap or an antibacterial soap from the neck down.  If not supplied by your surgeon, hibiclens soap will need to be purchased over the counter in pharmacy.  Rinse soap off thoroughly.  Shampoo your hair with your regular shampoo    The Day of Surgery  Take another bath or shower with hibiclens or  any antibacterial soap, to reduce the chance of infection.  Take heart and blood pressure medications with a small sip of water, as advised by the perioperative team.  Do not take fluid pills  You may brush your teeth and rinse your mouth, but do not swallow any additional water.   Do not apply perfumes, powder, body lotions or deodorant on the day of surgery.  Nail polish should be removed.  Do not wear makeup or moisturizer  Wear comfortable clothes, such as a button front shirt and loose fitting pants.  Leave all jewelry, including body piercings, and valuables at home.    Bring any devices you will neeed after surgery such as crutches or canes.  If you have sleep apnea, please bring your CPAP machine  In the event that your physical condition changes including the onset of a cold or respiratory illness, or if you have to delay or cancel your surgery, please notify your surgeon.

## 2023-12-21 NOTE — OUTPATIENT SUBJECTIVE & OBJECTIVE
Outpatient Subjective & Objective      Chief Complaint: Preoperative evaulation, perioperative medical management, and complication reduction plan.     Functional Capacity:   Able to climb a flight of stairs without CP SOB or Syncope.  Able to meet 4 METs      Anesthesia issues: None    Difficulty mouth opening:n    Steroid use in the last 12 months:injections in joint     Dental Issues:n    Family anesthesia difficulty: None       Family Hx of Thrombosis n    Past Medical History:   Diagnosis Date    Arthritis     History of adenomatous polyp of colon     Hypertension     Joint pain     Pneumonia     during [facility]     Primary osteoarthritis of right knee 2016    Tendonitis of ankle or foot     Trouble in sleeping     Urinary incontinence        Past Surgical History:   Procedure Laterality Date     SECTION          COLONOSCOPY N/A 2018    Procedure: COLONOSCOPY;  Surgeon: Jim Borges MD;  Location: [facility] ([address] [place] [address]);  Service: Endoscopy;  Laterality: N/A;    hemorrhiodectomy      tendon surgery L foot      TOTAL KNEE ARTHROPLASTY Right 2020    Procedure: ARTHROPLASTY, KNEE, TOTAL-DEPUY-SIGMA/DEPUY-TC3/MBT back up;  Surgeon: Zack Patton III, MD;  Location: [facility];  Service: Orthopedics;  Laterality: Right;       Review of Systems   Constitutional:  Negative for chills, fatigue and fever.   HENT:  Negative for trouble swallowing and voice change.    Eyes:  Negative for photophobia and visual disturbance.        No acute visual changes   Respiratory:  Negative for apnea, cough, chest tightness, shortness of breath and wheezing.         STOP bang  Score 3    High risk EDMUNDO   Cardiovascular:  Negative for chest pain, palpitations and leg swelling.   Gastrointestinal:  Negative for abdominal distention, abdominal pain, blood in stool, constipation, diarrhea, nausea and vomiting.        NO FLD, hepatitis, cirrhosis  No BRB or black tarry stool     Genitourinary:  Negative for  "difficulty urinating, dysuria, flank pain, frequency, hematuria and urgency.   Musculoskeletal:  Positive for arthralgias and joint swelling. Negative for back pain, gait problem, myalgias, neck pain and neck stiffness.   Neurological:  Negative for dizziness, tremors, seizures, syncope, weakness, light-headedness, numbness and headaches.   Psychiatric/Behavioral:  Negative for suicidal ideas.           VITALS  Visit Vitals  BP (!) 150/72 (BP Location: Left arm, Patient Position: Sitting)   Pulse (!) 55   Temp 97.8 °F (36.6 °C) (Oral)   Ht 5' 10" (1.778 m)   Wt 80 kg (176 lb 5.9 oz)   LMP  (LMP Unknown)   SpO2 97%   BMI 25.31 kg/m²      Physical Exam     Significant Labs:  Lab Results   Component Value Date    WBC 5.68 12/21/2023    HGB 13.2 12/21/2023    HCT 37.5 12/21/2023     12/21/2023    CHOL 140 11/28/2022    TRIG 37 11/28/2022    HDL 64 11/28/2022    ALT 17 12/21/2023    AST 22 12/21/2023     12/21/2023    K 4.3 12/21/2023     12/21/2023    CREATININE 0.7 12/21/2023    BUN 21 12/21/2023    CO2 24 12/21/2023    TSH 2.241 11/28/2022    INR 1.0 12/21/2023    HGBA1C 6.1 03/04/2013       Diagnostic Studies: No relevant studies.    EKG:   Results for orders placed or performed during the hospital encounter of 12/21/23   EKG 12-lead    Collection Time: 12/21/23 12:31 PM    Narrative    Test Reason : Z01.818,    Vent. Rate : 051 BPM     Atrial Rate : 051 BPM     P-R Int : 152 ms          QRS Dur : 076 ms      QT Int : 446 ms       P-R-T Axes : 078 056 063 degrees     QTc Int : 411 ms    Sinus bradycardia  Otherwise normal ECG  When compared with ECG of 02-JUN-2020 10:05,  No significant change was found  Confirmed by Maura Moctezuma MD ([id number]) on 12/21/2023 1:44:44 PM    Referred By: VIJI MUÑOZ           Confirmed By:Maura Moctezuma MD       2D ECHO:  TTE:  No results found for this or any previous visit.    DINA:  No results found for this or any previous visit.     Imaging     Active Cardiac " Conditions: None      Revised Cardiac Risk Index   High -Risk Surgery  Intraperitoneal; Intrathoracic; suprainguinal vascular Yes- + 1 No- 0   History of Ischemic Heart Disease   (Hx of MI/positive exercise test/current chest pain due to ischemia/use of nitrate therapy/EKG with pathological Q waves) Yes- + 1 No- 0   History of CHF  (Pulmonary edema/bilateral rales or S3 gallop/PND/CXR showing pulmonary vascular redistribution) Yes- + 1 No- 0   History of CVA   (Prior stroke or TIA) Yes- + 1 No- 0   Pre-operative treatment with insulin Yes- + 1 No- 0   Pre-operative creatinine > 2mg/dl Yes- + 1 No- 0   Total:      Risk Status:  Estimated risk of cardiac complications after non-cardiac surgery using the Revised Cardiac Risk Index for Preoperative risk is 3.9 %      ARISCAT (Canet) risk index: Low: 1.6% risk of post-op pulmonary complications.    American Society of Anesthesiologists Physical Status classification (ASA): 3           No further cardiac workup needed prior to surgery.    Outpatient Subjective & Objective

## 2023-12-21 NOTE — PROGRESS NOTES
Pre-op L GAGANDEEP TKA 1/8/24    Last CSI helped tremendously but starting to wear off    No interval changes    Exam , 15 degree valgus    This patient has significant symptoms in their knee that are affecting their quality of life and daily activities.  They have tried non-operative treatment including analgesics, an exercise program, and activity modification, but the symptoms have persisted. I believe they make a good candidate for knee arthroplasty.     The risks and benefits of knee arthroplasty have been discussed with the patient which include, but are not limited to infections (including severe sequelae), potential component failure, fracture, DVT, pulmonary embolus, nerve palsy, poor range of motion, the possibility of bone grafting, persistent pain, wound healing complications, transfusions, medical complications and death.     We discussed surgical options including implant type and why I believe the implant selected is a good match for the patient's needs. The patient expressed understanding and agrees to proceed with the planned surgery.     Pre-operative planning will include the following:  A pre-surgical evaluation by will be arranged.  Pre-op orders will be placed.  We will make arrangements with the operating room for proper time and staffing.  We will make Social Service arrangements for the patient.    Implants:   Company: [employer]  System: [employer]  PS  All poly conv tibia     CT scan: [facility] protocol for operative planning   Additional discussion about pin sites: risk of wound healing issues and fracture        DVT prophylaxis: ASA 81mg BID x1 month    Dispo: outpatient PT     Admission status: Outpatient/23hr OBS    Location: [facility]     JANIE PLASCENCIAO TKA 1/8/24    Requests 1 night like R knee, had difficult time with pain last time  Will try dilaudid instead oxycodone  Lyrica 75mg BID post op (was on charbel post op R knee for neuropathic pain, not on now)

## 2023-12-21 NOTE — PROGRESS NOTES
[facility]  Progress Note    Patient Name: Aurora Castle  MRN: [id number]  Date of Evaluation- 2023  PCP- Isabelle Vincent MD    Future cases for Aurora Castle [[id number]]       Case ID Status Date Time Jamar Procedure Provider Location    [id number] [facility] 2024  7:00  ARTHROPLASTY, KNEE, TOTAL, USING COMPUTER-ASSISTED NAVIGATION: GAGANDEEP: LEFT: Zack Bryan III, MD [[id number]] [facility] OR            HPI:  This is a 78 y.o. female  who presents today for a preoperative evaluation in preparation for left knee replacement  Surgery is indicated for  left knee osteoarthritis   .   Patient is new to me.    The history has been obtained by speaking with the patient and reviewing the electronic medical record and/or outside health information. Significant health conditions for the perioperative period are discussed below in assessment and plan.     Patient reports current health status to be Excellent.  Denies any new symptoms before surgery.       Subjective/ Objective:     Chief Complaint: Preoperative evaulation, perioperative medical management, and complication reduction plan.     Functional Capacity:   Able to climb a flight of stairs without CP SOB or Syncope.  Able to meet 4 METs      Anesthesia issues: None    Difficulty mouth opening:n    Steroid use in the last 12 months:injections in joint     Dental Issues:n    Family anesthesia difficulty: None       Family Hx of Thrombosis n    Past Medical History:   Diagnosis Date    Arthritis     History of adenomatous polyp of colon     Hypertension     Joint pain     Pneumonia     during college     Primary osteoarthritis of right knee 2016    Tendonitis of ankle or foot     Trouble in sleeping     Urinary incontinence        Past Surgical History:   Procedure Laterality Date     SECTION          COLONOSCOPY N/A 2018    Procedure: COLONOSCOPY;  Surgeon: Jim Borges MD;  Location: [address]  "[facility]);  Service: Endoscopy;  Laterality: N/A;    hemorrhiodectomy      tendon surgery L foot      TOTAL KNEE ARTHROPLASTY Right 6/8/2020    Procedure: ARTHROPLASTY, KNEE, TOTAL-DEPUY-SIGMA/DEPUY-TC3/MBT back up;  Surgeon: Zack Patton III, MD;  Location: [facility];  Service: Orthopedics;  Laterality: Right;       Review of Systems   Constitutional:  Negative for chills, fatigue and fever.   HENT:  Negative for trouble swallowing and voice change.    Eyes:  Negative for photophobia and visual disturbance.        No acute visual changes   Respiratory:  Negative for apnea, cough, chest tightness, shortness of breath and wheezing.         STOP bang  Score 3    High risk EDMUNDO   Cardiovascular:  Negative for chest pain, palpitations and leg swelling.   Gastrointestinal:  Negative for abdominal distention, abdominal pain, blood in stool, constipation, diarrhea, nausea and vomiting.        NO FLD, hepatitis, cirrhosis  No BRB or black tarry stool     Genitourinary:  Negative for difficulty urinating, dysuria, flank pain, frequency, hematuria and urgency.   Musculoskeletal:  Positive for arthralgias and joint swelling. Negative for back pain, gait problem, myalgias, neck pain and neck stiffness.   Neurological:  Negative for dizziness, tremors, seizures, syncope, weakness, light-headedness, numbness and headaches.   Psychiatric/Behavioral:  Negative for suicidal ideas.           VITALS  Visit Vitals  BP (!) 150/72 (BP Location: Left arm, Patient Position: Sitting)   Pulse (!) 55   Temp 97.8 °F (36.6 °C) (Oral)   Ht 5' 10" (1.778 m)   Wt 80 kg (176 lb 5.9 oz)   LMP  (LMP Unknown)   SpO2 97%   BMI 25.31 kg/m²      Physical Exam     Significant Labs:  Lab Results   Component Value Date    WBC 5.68 12/21/2023    HGB 13.2 12/21/2023    HCT 37.5 12/21/2023     12/21/2023    CHOL 140 11/28/2022    TRIG 37 11/28/2022    HDL 64 11/28/2022    ALT 17 12/21/2023    AST 22 12/21/2023     12/21/2023    K 4.3 12/21/2023    CL " 106 12/21/2023    CREATININE 0.7 12/21/2023    BUN 21 12/21/2023    CO2 24 12/21/2023    TSH 2.241 11/28/2022    INR 1.0 12/21/2023    HGBA1C 6.1 03/04/2013       Diagnostic Studies: No relevant studies.    EKG:   Results for orders placed or performed during the hospital encounter of 12/21/23   EKG 12-lead    Collection Time: 12/21/23 12:31 PM    Narrative    Test Reason : Z01.818,    Vent. Rate : 051 BPM     Atrial Rate : 051 BPM     P-R Int : 152 ms          QRS Dur : 076 ms      QT Int : 446 ms       P-R-T Axes : 078 056 063 degrees     QTc Int : 411 ms    Sinus bradycardia  Otherwise normal ECG  When compared with ECG of 02-JUN-2020 10:05,  No significant change was found  Confirmed by Maura Moctezuma MD ([id number]) on 12/21/2023 1:44:44 PM    Referred By: VIJI MUÑOZ           Confirmed By:Maura Moctezuma MD       2D ECHO:  TTE:  No results found for this or any previous visit.    DINA:  No results found for this or any previous visit.     Imaging     Active Cardiac Conditions: None      Revised Cardiac Risk Index   High -Risk Surgery  Intraperitoneal; Intrathoracic; suprainguinal vascular Yes- + 1 No- 0   History of Ischemic Heart Disease   (Hx of MI/positive exercise test/current chest pain due to ischemia/use of nitrate therapy/EKG with pathological Q waves) Yes- + 1 No- 0   History of CHF  (Pulmonary edema/bilateral rales or S3 gallop/PND/CXR showing pulmonary vascular redistribution) Yes- + 1 No- 0   History of CVA   (Prior stroke or TIA) Yes- + 1 No- 0   Pre-operative treatment with insulin Yes- + 1 No- 0   Pre-operative creatinine > 2mg/dl Yes- + 1 No- 0   Total:      Risk Status:  Estimated risk of cardiac complications after non-cardiac surgery using the Revised Cardiac Risk Index for Preoperative risk is 3.9 %      ARISCAT (Canet) risk index: Low: 1.6% risk of post-op pulmonary complications.    American Society of Anesthesiologists Physical Status classification (ASA): 3           No further cardiac workup  needed prior to surgery.        Preoperative cardiac risk assessment-  The patient does not have any active cardiac conditions . Revised cardiac risk index predictors- ---.Functional capacity is more than 4 Mets. She will be undergoing a Orthopedic procedure that carries a Moderate Risk risk     The estimated risk of the rate of adverse cardiac outcomes  3.9    No further cardiac work up is indicated prior to proceeding with the surgery          American Society of Anesthesiologists Physical status classification ( ASA ) class: 2     Postoperative pulmonary complication risk assessment: 1.6     ARISCAT ( Canet) risk index- risk class -  Low, if duration of surgery is under 3 hours, intermediate, if duration of surgery is over 3 hours          Assessment/Plan:     HTN (hypertension)  Current BP  150/72 today.    Taking: VAlsartan  Patient reports home BP readings of:      Lifestyle changes to reduce systolic BP:  Smoking cessation; exercise 30 minutes per day,  5 days per week or 150 minutes weekly; sodium reduction and avoidance of high salt foods such as processed meats, frozen meals and  fast foods.   Keeping a healthy weight/BMI can help with better BP control    BP acceptable for surgery. I recommend monitoring BP during perioperative period as uncontrolled pain can elevate blood pressure.         Urinary incontinence, mixed  Uses Pessary prn    Uterovaginal prolapse  Uses Pessary prn    Insomnia  1/2 Unisom and Melatonin nightly for sleep    Abnormal RBC  Component      Latest Ref Rng 12/21/2023   RBC      4.00 - 5.40 M/uL 3.95 (L)    Hemoglobin      12.0 - 16.0 g/dL 13.2    Hematocrit      37.0 - 48.5 % 37.5    MCV      82 - 98 fL 95    MCH      27.0 - 31.0 pg 33.4 (H)    MCHC      32.0 - 36.0 g/dL 35.2    RDW      11.5 - 14.5 % 13.0       Legend:  (L) Low  (H) High    Arthritis of left knee  1/8/23 left knee surgery        Preventive perioperative care    Thromboembolic prophylaxis:  Her risk factors for  thrombosis include surgical procedure, age, and reduced mobility.I suggest  thromboembolic prophylaxis ( mechanical/pharmacological, weighing the risk benefits of pharmacological agent use considering johann procedural bleeding )  during the perioperative period.I suggested being active in the post operative period.      Postoperative pulmonary complication prophylaxis-Risk factors for post operative pulmonary complications include age over 65 years and ASA class >2- I suggest incentive spirometry use, early ambulation, and pain control so as to avoid diaphragmatic splinting  Brush teeth twice per day, oral rinses, sleep with the head of the bed up 30 degrees     Renal complication prophylaxis-Risk factors for renal complications include age and hypertension . I suggest keeping her well hydrated and avoidance/ minimizing the use of  NSAID's,DE PAZ 2 Inhibitors ,IV contrast if possible in the perioperative period.I suggested drinking 2 litre's of water a day      Surgical site Infection Prophylaxis-I  suggest appropriate antibiotic for Prophylaxis against Surgical site infections Shower with Hibiclens in the night before surgery and the morning of surgery          In view of Spine procedure the patient  is at risk of postoperative urinary retention.  I suggest avoidance / minimizing the of  Benzodiazepines,Anticholinergic medication,antihistamines ( Benadryl) , if possible in the perioperative period. I suggest using the minimum possible use of opioids for the minimum period of time in the perioperative period. Benadryl avoidance suggested      This visit was focused on Preoperative evaluation, Perioperative Medical management, complication reduction plans. I suggest that the patient follows up with primary care or relevant sub specialists for ongoing health care.    I appreciate the opportunity to be involved in this patients care. Please feel free to contact me if there were any questions about this  consultation.    Patient is optimized    I spent a total of 36 minutes on the day of the visit.This includes face to face time and non-face to face time preparing to see the patient (eg, review of tests), obtaining and/or reviewing separately obtained history, documenting clinical information in the electronic or other health record, independently interpreting results and communicating results to the patient/family/caregiver, or care coordinator.      Arvind Palma NP  Perioperative Medicine  [facility]   Pager [phone]

## 2023-12-21 NOTE — PROGRESS NOTES
Aurora Castle is a 78 y.o. year old here today for pre surgery optimization visit  in preparation for a Left total knee arthroplasty to be performed by Dr. Patton  on 1/8/24.  she was last seen and treated in the clinic on 9/26/2023. she will be medically optimized by the pre op center. There has been no significant change in medical status since last visit. No fever, chills, malaise, or unexplained weight change.      Allergies, Medications, past medical and surgical history reviewed.    Focused examination performed.    Patient saw surgeon in clinic today. All questions answered. Patient encouraged to call with questions. Contact information given.     Pre, johann, and post operative procedures and expectations discussed. Goals of successful surgery reviewed and include manageable pain levels, surgical site free of infection, medication management, and ambulation with PT/OT assistance. Healthy weight management discussed with patient and caregiver who were receptive to eduction of healthy diet and activity. No other necessary lifestyle changes identified. Educated patient about signs and symptoms of infection, medication management, anticoagulation therapy, risk of tobacco and alcohol use, and self-care to promote healing. Surgical guide given for future reference. Hibiclens given to patient with instructions. All questions were answered.     Aurora Castle verbalized an understanding to the education and goals. Patient has displayed readiness to engage in care and is ready to proceed with surgery.  Patient reports  is able and ready to provide assistance at home after discharge.    Surgical and blood consents signed.    DME will be arranged. This walker is to be ordered for use during Hospital Admission Only -- Not to be ordered for [facility] (HME). The mobility limitation cannot be sufficiently resolved by the use of a cane. Patient's functional mobility deficit can be sufficiently resolved  "with the use of a (Rolling Walker or Walker). Patient's mobility limitation significantly impairs their ability to participate in one of more activities of daily living. The use of a (Rolling Walker or Walker) will significantly improve the patient's ability to participate in MRADLS and the patient will use it on regular basis in the home."     Aurora Castle will contact us if there are any questions, concerns, or changes in medical status prior to surgery.       Joint class: 1/10/24    Patient has discussed discharge planning with surgeon. Patient will be discharged to home following surgery.   patient will be scheduled with [facility].     30 minutes of time was spent on patient education, review of records, templating, H&P, , appointment scheduling and optimizing patient for surgery.      "

## 2023-12-29 ENCOUNTER — PATIENT MESSAGE (OUTPATIENT)
Dept: ORTHOPEDICS | Facility: CLINIC | Age: 78
End: 2023-12-29
Payer: MEDICARE

## 2023-12-30 ENCOUNTER — OFFICE VISIT (OUTPATIENT)
Dept: URGENT CARE | Facility: CLINIC | Age: 78
End: 2023-12-30
Payer: MEDICARE

## 2023-12-30 VITALS
BODY MASS INDEX: 25.25 KG/M2 | WEIGHT: 176.38 LBS | TEMPERATURE: 98 F | HEIGHT: 70 IN | RESPIRATION RATE: 16 BRPM | HEART RATE: 73 BPM | OXYGEN SATURATION: 96 % | SYSTOLIC BLOOD PRESSURE: 128 MMHG | DIASTOLIC BLOOD PRESSURE: 76 MMHG

## 2023-12-30 DIAGNOSIS — R05.9 COUGH, UNSPECIFIED TYPE: Primary | ICD-10-CM

## 2023-12-30 DIAGNOSIS — J06.9 ACUTE UPPER RESPIRATORY INFECTION: ICD-10-CM

## 2023-12-30 DIAGNOSIS — J02.9 SORE THROAT: ICD-10-CM

## 2023-12-30 LAB
CTP QC/QA: YES
MOLECULAR STREP A: NEGATIVE
POC MOLECULAR INFLUENZA A AGN: NEGATIVE
POC MOLECULAR INFLUENZA B AGN: NEGATIVE
SARS-COV-2 AG RESP QL IA.RAPID: NEGATIVE

## 2023-12-30 PROCEDURE — 87651 POCT STREP A MOLECULAR: ICD-10-PCS | Mod: QW,S$GLB,, | Performed by: FAMILY MEDICINE

## 2023-12-30 PROCEDURE — 99213 PR OFFICE/OUTPT VISIT, EST, LEVL III, 20-29 MIN: ICD-10-PCS | Mod: S$GLB,,, | Performed by: FAMILY MEDICINE

## 2023-12-30 PROCEDURE — 87651 STREP A DNA AMP PROBE: CPT | Mod: QW,S$GLB,, | Performed by: FAMILY MEDICINE

## 2023-12-30 PROCEDURE — 87502 INFLUENZA DNA AMP PROBE: CPT | Mod: QW,S$GLB,, | Performed by: FAMILY MEDICINE

## 2023-12-30 PROCEDURE — 87811 SARS-COV-2 COVID19 W/OPTIC: CPT | Mod: QW,S$GLB,, | Performed by: FAMILY MEDICINE

## 2023-12-30 PROCEDURE — 87502 POCT INFLUENZA A/B MOLECULAR: ICD-10-PCS | Mod: QW,S$GLB,, | Performed by: FAMILY MEDICINE

## 2023-12-30 PROCEDURE — 99213 OFFICE O/P EST LOW 20 MIN: CPT | Mod: S$GLB,,, | Performed by: FAMILY MEDICINE

## 2023-12-30 PROCEDURE — 87811 SARS CORONAVIRUS 2 ANTIGEN POCT, MANUAL READ: ICD-10-PCS | Mod: QW,S$GLB,, | Performed by: FAMILY MEDICINE

## 2023-12-30 RX ORDER — TRIAMCINOLONE ACETONIDE 40 MG/ML
INJECTION, SUSPENSION INTRA-ARTICULAR; INTRAMUSCULAR
COMMUNITY
Start: 2023-09-26 | End: 2024-04-02

## 2023-12-30 RX ORDER — CHLORHEXIDINE GLUCONATE ORAL RINSE 1.2 MG/ML
SOLUTION DENTAL
Status: ON HOLD | COMMUNITY
Start: 2023-11-14 | End: 2024-01-08 | Stop reason: HOSPADM

## 2023-12-30 RX ORDER — PROMETHAZINE HYDROCHLORIDE AND DEXTROMETHORPHAN HYDROBROMIDE 6.25; 15 MG/5ML; MG/5ML
5 SYRUP ORAL EVERY 4 HOURS PRN
Qty: 118 ML | Refills: 0 | Status: SHIPPED | OUTPATIENT
Start: 2023-12-30 | End: 2024-01-09

## 2023-12-30 RX ORDER — HYDROCODONE BITARTRATE AND ACETAMINOPHEN 7.5; 325 MG/1; MG/1
TABLET ORAL
Status: ON HOLD | COMMUNITY
Start: 2023-11-14 | End: 2024-01-08 | Stop reason: HOSPADM

## 2023-12-30 RX ORDER — AMOXICILLIN 500 MG/1
CAPSULE ORAL
Status: ON HOLD | COMMUNITY
Start: 2023-11-14 | End: 2024-01-08 | Stop reason: HOSPADM

## 2023-12-30 NOTE — PROGRESS NOTES
"Subjective:      Patient ID: Aurora Castle is a 78 y.o. female.    Vitals:  height is 5' 10" (1.778 m) and weight is 80 kg (176 lb 5.9 oz). Her oral temperature is 98.2 °F (36.8 °C). Her blood pressure is 128/76 and her pulse is 73. Her respiration is 16 and oxygen saturation is 96%.     Chief Complaint: Cough    Pt presents productive cough, body aches, runny nose, and night sweats. Sx began on Wednesday evening. Pt hasn't taken any cold meds due to hypertension. Pt took at home covid test on Thursday that was negative. Pt states that the test was . Pt is UTD on flu and covid vaccines.     Cough  The current episode started in the past 7 days. The cough is Productive of sputum. Associated symptoms include myalgias, nasal congestion, rhinorrhea and sweats. She has tried nothing for the symptoms. The treatment provided no relief.       Respiratory:  Positive for cough.    Musculoskeletal:  Positive for muscle ache.      Objective:     Physical Exam    Assessment:     1. Cough, unspecified type    2. Sore throat    3. Acute upper respiratory infection        Plan:       Cough, unspecified type  -     SARS Coronavirus 2 Antigen, POCT Manual Read- neg  -     POCT Influenza A/B MOLECULAR  -     promethazine-dextromethorphan - (PROMETHAZINE-DM) 6.25-15 mg/5 mL Syrp; Take 5 mLs by mouth every 4 (four) hours as needed. For cough/congestion  Dispense: 118 mL; Refill: 0    Sore throat  -     POCT Strep A, Molecular- neg    Acute upper respiratory infection  -     promethazine-dextromethorphan (PROMETHAZINE-DM) 6.25-15 mg/5 mL Syrp; Take 5 mLs by mouth every 4 (four) hours as needed. For cough/congestion  Dispense: 118 mL; Refill: 0      supportive care encouraged, ie Rest and hydration, OTC cold preparations / analgesics/ antipyretics)      RTC PRN                  "

## 2023-12-30 NOTE — PATIENT INSTRUCTIONS
You can also use the brand Coricidin HBP cough/cold/sinus medication that should not elevate blood pressure

## 2024-01-05 ENCOUNTER — TELEPHONE (OUTPATIENT)
Dept: ORTHOPEDICS | Facility: CLINIC | Age: 79
End: 2024-01-05
Payer: MEDICARE

## 2024-01-05 DIAGNOSIS — Z96.652 S/P TOTAL KNEE REPLACEMENT, LEFT: Primary | ICD-10-CM

## 2024-01-05 RX ORDER — METHOCARBAMOL 750 MG/1
750 TABLET, FILM COATED ORAL 4 TIMES DAILY PRN
Qty: 40 TABLET | Refills: 0 | Status: SHIPPED | OUTPATIENT
Start: 2024-01-05 | End: 2024-01-18 | Stop reason: SDUPTHER

## 2024-01-05 RX ORDER — ASPIRIN 81 MG/1
81 TABLET ORAL 2 TIMES DAILY
Qty: 60 TABLET | Refills: 0 | Status: SHIPPED | OUTPATIENT
Start: 2024-01-05 | End: 2024-02-08

## 2024-01-05 RX ORDER — DEXTROMETHORPHAN HYDROBROMIDE, GUAIFENESIN 5; 100 MG/5ML; MG/5ML
650 LIQUID ORAL EVERY 8 HOURS
Qty: 120 TABLET | Refills: 0 | Status: SHIPPED | OUTPATIENT
Start: 2024-01-05 | End: 2024-02-12 | Stop reason: SDUPTHER

## 2024-01-05 RX ORDER — CELECOXIB 200 MG/1
200 CAPSULE ORAL DAILY
Qty: 30 CAPSULE | Refills: 0 | Status: SHIPPED | OUTPATIENT
Start: 2024-01-05 | End: 2024-02-05 | Stop reason: SDUPTHER

## 2024-01-05 RX ORDER — PANTOPRAZOLE SODIUM 40 MG/1
40 TABLET, DELAYED RELEASE ORAL DAILY
Qty: 30 TABLET | Refills: 0 | Status: SHIPPED | OUTPATIENT
Start: 2024-01-05 | End: 2024-02-05 | Stop reason: SDUPTHER

## 2024-01-05 RX ORDER — AMOXICILLIN 250 MG
1 CAPSULE ORAL DAILY
Qty: 30 TABLET | Refills: 0 | Status: SHIPPED | OUTPATIENT
Start: 2024-01-05

## 2024-01-05 RX ORDER — CEFADROXIL 500 MG/1
500 CAPSULE ORAL EVERY 12 HOURS
Qty: 14 CAPSULE | Refills: 0 | Status: SHIPPED | OUTPATIENT
Start: 2024-01-05

## 2024-01-05 RX ORDER — HYDROMORPHONE HYDROCHLORIDE 2 MG/1
TABLET ORAL
Qty: 50 TABLET | Refills: 0 | Status: SHIPPED | OUTPATIENT
Start: 2024-01-05 | End: 2024-02-05 | Stop reason: SDUPTHER

## 2024-01-05 RX ORDER — PREGABALIN 75 MG/1
75 CAPSULE ORAL 2 TIMES DAILY
Qty: 60 CAPSULE | Refills: 0 | Status: SHIPPED | OUTPATIENT
Start: 2024-01-05 | End: 2024-02-05 | Stop reason: SDUPTHER

## 2024-01-05 NOTE — TELEPHONE ENCOUNTER
Spoke to pt, informed her arrival time on Monday 1/8/24 is 1030 am at the [facility] location, [address], no food or drink after midnight on Sunday. Advised to be careful of skin over the weekend. Pt states she has been sick over the last week but symptoms have resolved. She tested negative for the flu and Covid on 12/30/23. Her  tested positive for Covid this morning, so she retested today and is negative. Informed to separate from  over the weekend, wear masks, wash hands. Confirmed pt has another caregiver that can help her after surgery if  is to ill. Pt will also have someone else bring her to surgery on Monday. Pt again confirmed she has no active symptoms. Pt pleased and verbalized understanding.

## 2024-01-07 ENCOUNTER — ANESTHESIA EVENT (OUTPATIENT)
Dept: SURGERY | Facility: HOSPITAL | Age: 79
End: 2024-01-07
Payer: MEDICARE

## 2024-01-08 ENCOUNTER — HOSPITAL ENCOUNTER (OUTPATIENT)
Facility: HOSPITAL | Age: 79
Discharge: HOME | End: 2024-01-09
Attending: ORTHOPAEDIC SURGERY | Admitting: ORTHOPAEDIC SURGERY
Payer: MEDICARE

## 2024-01-08 ENCOUNTER — ANESTHESIA (OUTPATIENT)
Dept: SURGERY | Facility: HOSPITAL | Age: 79
End: 2024-01-08
Payer: MEDICARE

## 2024-01-08 DIAGNOSIS — Z96.652 S/P TOTAL KNEE REPLACEMENT, LEFT: ICD-10-CM

## 2024-01-08 DIAGNOSIS — M17.12 PRIMARY OSTEOARTHRITIS OF LEFT KNEE: ICD-10-CM

## 2024-01-08 PROCEDURE — 25000003 PHARM REV CODE 250

## 2024-01-08 PROCEDURE — 99900035 HC TECH TIME PER 15 MIN (STAT)

## 2024-01-08 PROCEDURE — 27201423 OPTIME MED/SURG SUP & DEVICES STERILE SUPPLY: Performed by: ORTHOPAEDIC SURGERY

## 2024-01-08 PROCEDURE — 94761 N-INVAS EAR/PLS OXIMETRY MLT: CPT

## 2024-01-08 PROCEDURE — 36000712 HC OR TIME LEV V 1ST 15 MIN: Performed by: ORTHOPAEDIC SURGERY

## 2024-01-08 PROCEDURE — 97530 THERAPEUTIC ACTIVITIES: CPT

## 2024-01-08 PROCEDURE — 63600175 PHARM REV CODE 636 W HCPCS

## 2024-01-08 PROCEDURE — 37000009 HC ANESTHESIA EA ADD 15 MINS: Performed by: ORTHOPAEDIC SURGERY

## 2024-01-08 PROCEDURE — D9220A PRA ANESTHESIA: Mod: CRNA,,, | Performed by: NURSE ANESTHETIST, CERTIFIED REGISTERED

## 2024-01-08 PROCEDURE — 27447 TOTAL KNEE ARTHROPLASTY: CPT | Mod: HCNC,LT,, | Performed by: ORTHOPAEDIC SURGERY

## 2024-01-08 PROCEDURE — 25000003 PHARM REV CODE 250: Performed by: ANESTHESIOLOGY

## 2024-01-08 PROCEDURE — 94799 UNLISTED PULMONARY SVC/PX: CPT | Mod: XB

## 2024-01-08 PROCEDURE — 63600175 PHARM REV CODE 636 W HCPCS: Performed by: ORTHOPAEDIC SURGERY

## 2024-01-08 PROCEDURE — 25000003 PHARM REV CODE 250: Performed by: NURSE ANESTHETIST, CERTIFIED REGISTERED

## 2024-01-08 PROCEDURE — 63600175 PHARM REV CODE 636 W HCPCS: Performed by: NURSE ANESTHETIST, CERTIFIED REGISTERED

## 2024-01-08 PROCEDURE — 25000003 PHARM REV CODE 250: Performed by: STUDENT IN AN ORGANIZED HEALTH CARE EDUCATION/TRAINING PROGRAM

## 2024-01-08 PROCEDURE — 0055T BONE SRGRY CMPTR CT/MRI IMAG: CPT | Mod: HCNC,,, | Performed by: ORTHOPAEDIC SURGERY

## 2024-01-08 PROCEDURE — 37000008 HC ANESTHESIA 1ST 15 MINUTES: Performed by: ORTHOPAEDIC SURGERY

## 2024-01-08 PROCEDURE — 25000003 PHARM REV CODE 250: Performed by: ORTHOPAEDIC SURGERY

## 2024-01-08 PROCEDURE — 97535 SELF CARE MNGMENT TRAINING: CPT

## 2024-01-08 PROCEDURE — C1713 ANCHOR/SCREW BN/BN,TIS/BN: HCPCS | Performed by: ORTHOPAEDIC SURGERY

## 2024-01-08 PROCEDURE — 97165 OT EVAL LOW COMPLEX 30 MIN: CPT

## 2024-01-08 PROCEDURE — D9220A PRA ANESTHESIA: Mod: ANES,,, | Performed by: SURGERY

## 2024-01-08 PROCEDURE — 71000033 HC RECOVERY, INTIAL HOUR: Performed by: ORTHOPAEDIC SURGERY

## 2024-01-08 PROCEDURE — 63600175 PHARM REV CODE 636 W HCPCS: Performed by: SURGERY

## 2024-01-08 PROCEDURE — 36000713 HC OR TIME LEV V EA ADD 15 MIN: Performed by: ORTHOPAEDIC SURGERY

## 2024-01-08 PROCEDURE — C1776 JOINT DEVICE (IMPLANTABLE): HCPCS | Performed by: ORTHOPAEDIC SURGERY

## 2024-01-08 DEVICE — POSTERIOR STABILIZED FEMORAL
Type: IMPLANTABLE DEVICE | Site: KNEE | Status: FUNCTIONAL
Brand: TRIATHLON

## 2024-01-08 DEVICE — PATELLA
Type: IMPLANTABLE DEVICE | Site: KNEE | Status: FUNCTIONAL
Brand: TRIATHLON

## 2024-01-08 DEVICE — CEMENT BONE SURG SMPLX P RADPQ: Type: IMPLANTABLE DEVICE | Site: KNEE | Status: FUNCTIONAL

## 2024-01-08 DEVICE — IMPLANTABLE DEVICE: Type: IMPLANTABLE DEVICE | Site: KNEE | Status: FUNCTIONAL

## 2024-01-08 RX ORDER — FENTANYL CITRATE 50 UG/ML
25 INJECTION, SOLUTION INTRAMUSCULAR; INTRAVENOUS EVERY 5 MIN PRN
Status: DISCONTINUED | OUTPATIENT
Start: 2024-01-08 | End: 2024-01-08 | Stop reason: HOSPADM

## 2024-01-08 RX ORDER — PROCHLORPERAZINE EDISYLATE 5 MG/ML
5 INJECTION INTRAMUSCULAR; INTRAVENOUS EVERY 6 HOURS PRN
Status: DISCONTINUED | OUTPATIENT
Start: 2024-01-08 | End: 2024-01-09 | Stop reason: HOSPADM

## 2024-01-08 RX ORDER — DEXAMETHASONE SODIUM PHOSPHATE 4 MG/ML
INJECTION, SOLUTION INTRA-ARTICULAR; INTRALESIONAL; INTRAMUSCULAR; INTRAVENOUS; SOFT TISSUE
Status: DISCONTINUED | OUTPATIENT
Start: 2024-01-08 | End: 2024-01-08

## 2024-01-08 RX ORDER — LIDOCAINE HYDROCHLORIDE AND EPINEPHRINE 15; 5 MG/ML; UG/ML
INJECTION, SOLUTION EPIDURAL
Status: DISCONTINUED | OUTPATIENT
Start: 2024-01-08 | End: 2024-01-08

## 2024-01-08 RX ORDER — VALSARTAN 80 MG/1
80 TABLET ORAL DAILY
Status: DISCONTINUED | OUTPATIENT
Start: 2024-01-08 | End: 2024-01-08

## 2024-01-08 RX ORDER — SODIUM CHLORIDE 9 MG/ML
INJECTION, SOLUTION INTRAVENOUS CONTINUOUS
Status: DISCONTINUED | OUTPATIENT
Start: 2024-01-08 | End: 2024-01-09 | Stop reason: HOSPADM

## 2024-01-08 RX ORDER — LIDOCAINE HYDROCHLORIDE 10 MG/ML
1 INJECTION, SOLUTION EPIDURAL; INFILTRATION; INTRACAUDAL; PERINEURAL
Status: DISCONTINUED | OUTPATIENT
Start: 2024-01-08 | End: 2024-01-08 | Stop reason: HOSPADM

## 2024-01-08 RX ORDER — FENTANYL CITRATE 50 UG/ML
25 INJECTION, SOLUTION INTRAMUSCULAR; INTRAVENOUS EVERY 5 MIN PRN
Status: DISCONTINUED | OUTPATIENT
Start: 2024-01-08 | End: 2024-01-08

## 2024-01-08 RX ORDER — FAMOTIDINE 20 MG/1
20 TABLET, FILM COATED ORAL 2 TIMES DAILY
Status: DISCONTINUED | OUTPATIENT
Start: 2024-01-08 | End: 2024-01-09 | Stop reason: HOSPADM

## 2024-01-08 RX ORDER — OXYCODONE HYDROCHLORIDE 5 MG/1
10 TABLET ORAL
Status: DISCONTINUED | OUTPATIENT
Start: 2024-01-08 | End: 2024-01-08

## 2024-01-08 RX ORDER — CEFAZOLIN SODIUM 1 G/3ML
INJECTION, POWDER, FOR SOLUTION INTRAMUSCULAR; INTRAVENOUS
Status: DISCONTINUED | OUTPATIENT
Start: 2024-01-08 | End: 2024-01-08

## 2024-01-08 RX ORDER — SODIUM CHLORIDE 0.9 % (FLUSH) 0.9 %
10 SYRINGE (ML) INJECTION
Status: DISCONTINUED | OUTPATIENT
Start: 2024-01-08 | End: 2024-01-08 | Stop reason: HOSPADM

## 2024-01-08 RX ORDER — TRANEXAMIC ACID 100 MG/ML
INJECTION, SOLUTION INTRAVENOUS
Status: DISCONTINUED | OUTPATIENT
Start: 2024-01-08 | End: 2024-01-08 | Stop reason: HOSPADM

## 2024-01-08 RX ORDER — AMOXICILLIN 250 MG
1 CAPSULE ORAL 2 TIMES DAILY
Status: DISCONTINUED | OUTPATIENT
Start: 2024-01-08 | End: 2024-01-09 | Stop reason: HOSPADM

## 2024-01-08 RX ORDER — ACETAMINOPHEN 500 MG
1000 TABLET ORAL
Status: COMPLETED | OUTPATIENT
Start: 2024-01-08 | End: 2024-01-08

## 2024-01-08 RX ORDER — FACIAL-BODY WIPES
10 EACH TOPICAL EVERY 12 HOURS PRN
Status: DISCONTINUED | OUTPATIENT
Start: 2024-01-08 | End: 2024-01-09 | Stop reason: HOSPADM

## 2024-01-08 RX ORDER — MORPHINE SULFATE 2 MG/ML
2 INJECTION, SOLUTION INTRAMUSCULAR; INTRAVENOUS
Status: DISCONTINUED | OUTPATIENT
Start: 2024-01-08 | End: 2024-01-09 | Stop reason: HOSPADM

## 2024-01-08 RX ORDER — MIDAZOLAM HYDROCHLORIDE 1 MG/ML
4 INJECTION INTRAMUSCULAR; INTRAVENOUS
Status: DISCONTINUED | OUTPATIENT
Start: 2024-01-08 | End: 2024-01-08 | Stop reason: HOSPADM

## 2024-01-08 RX ORDER — SODIUM CHLORIDE 0.9 % (FLUSH) 0.9 %
3 SYRINGE (ML) INJECTION
Status: DISCONTINUED | OUTPATIENT
Start: 2024-01-08 | End: 2024-01-08 | Stop reason: HOSPADM

## 2024-01-08 RX ORDER — SODIUM CHLORIDE 9 MG/ML
INJECTION, SOLUTION INTRAVENOUS
Status: COMPLETED | OUTPATIENT
Start: 2024-01-08 | End: 2024-01-08

## 2024-01-08 RX ORDER — NALOXONE HCL 0.4 MG/ML
0.02 VIAL (ML) INJECTION
Status: DISCONTINUED | OUTPATIENT
Start: 2024-01-08 | End: 2024-01-09 | Stop reason: HOSPADM

## 2024-01-08 RX ORDER — ROPIVACAINE/EPI/CLONIDINE/KET 2.46-0.005
SYRINGE (ML) INJECTION
Status: DISCONTINUED | OUTPATIENT
Start: 2024-01-08 | End: 2024-01-08 | Stop reason: HOSPADM

## 2024-01-08 RX ORDER — HYDROMORPHONE HYDROCHLORIDE 2 MG/1
4 TABLET ORAL EVERY 4 HOURS PRN
Status: DISCONTINUED | OUTPATIENT
Start: 2024-01-08 | End: 2024-01-09 | Stop reason: HOSPADM

## 2024-01-08 RX ORDER — ONDANSETRON 2 MG/ML
INJECTION INTRAMUSCULAR; INTRAVENOUS
Status: DISCONTINUED | OUTPATIENT
Start: 2024-01-08 | End: 2024-01-08

## 2024-01-08 RX ORDER — FAMOTIDINE 10 MG/ML
INJECTION INTRAVENOUS
Status: DISCONTINUED | OUTPATIENT
Start: 2024-01-08 | End: 2024-01-08

## 2024-01-08 RX ORDER — OXYCODONE HYDROCHLORIDE 5 MG/1
5 TABLET ORAL
Status: DISCONTINUED | OUTPATIENT
Start: 2024-01-08 | End: 2024-01-08

## 2024-01-08 RX ORDER — METHOCARBAMOL 750 MG/1
750 TABLET, FILM COATED ORAL 3 TIMES DAILY
Status: DISCONTINUED | OUTPATIENT
Start: 2024-01-08 | End: 2024-01-09 | Stop reason: HOSPADM

## 2024-01-08 RX ORDER — PROPOFOL 10 MG/ML
VIAL (ML) INTRAVENOUS CONTINUOUS PRN
Status: DISCONTINUED | OUTPATIENT
Start: 2024-01-08 | End: 2024-01-08

## 2024-01-08 RX ORDER — CELECOXIB 200 MG/1
400 CAPSULE ORAL ONCE
Status: COMPLETED | OUTPATIENT
Start: 2024-01-08 | End: 2024-01-08

## 2024-01-08 RX ORDER — MIDAZOLAM HYDROCHLORIDE 1 MG/ML
INJECTION INTRAMUSCULAR; INTRAVENOUS
Status: DISCONTINUED | OUTPATIENT
Start: 2024-01-08 | End: 2024-01-08

## 2024-01-08 RX ORDER — VANCOMYCIN HYDROCHLORIDE 1 G/20ML
INJECTION, POWDER, LYOPHILIZED, FOR SOLUTION INTRAVENOUS
Status: DISCONTINUED | OUTPATIENT
Start: 2024-01-08 | End: 2024-01-08 | Stop reason: HOSPADM

## 2024-01-08 RX ORDER — VALSARTAN 80 MG/1
80 TABLET ORAL DAILY
Status: DISCONTINUED | OUTPATIENT
Start: 2024-01-09 | End: 2024-01-09 | Stop reason: HOSPADM

## 2024-01-08 RX ORDER — POLYETHYLENE GLYCOL 3350 17 G/17G
17 POWDER, FOR SOLUTION ORAL DAILY
Status: DISCONTINUED | OUTPATIENT
Start: 2024-01-09 | End: 2024-01-09 | Stop reason: HOSPADM

## 2024-01-08 RX ORDER — PREGABALIN 75 MG/1
75 CAPSULE ORAL
Status: COMPLETED | OUTPATIENT
Start: 2024-01-08 | End: 2024-01-08

## 2024-01-08 RX ORDER — OXYCODONE HYDROCHLORIDE 5 MG/1
5 TABLET ORAL
Status: DISCONTINUED | OUTPATIENT
Start: 2024-01-08 | End: 2024-01-08 | Stop reason: HOSPADM

## 2024-01-08 RX ORDER — PREGABALIN 75 MG/1
75 CAPSULE ORAL NIGHTLY
Status: DISCONTINUED | OUTPATIENT
Start: 2024-01-08 | End: 2024-01-08

## 2024-01-08 RX ORDER — PREGABALIN 75 MG/1
75 CAPSULE ORAL 2 TIMES DAILY
Status: DISCONTINUED | OUTPATIENT
Start: 2024-01-08 | End: 2024-01-09 | Stop reason: HOSPADM

## 2024-01-08 RX ORDER — KETAMINE HCL IN 0.9 % NACL 50 MG/5 ML
SYRINGE (ML) INTRAVENOUS
Status: DISCONTINUED | OUTPATIENT
Start: 2024-01-08 | End: 2024-01-08

## 2024-01-08 RX ORDER — TALC
6 POWDER (GRAM) TOPICAL NIGHTLY PRN
Status: DISCONTINUED | OUTPATIENT
Start: 2024-01-08 | End: 2024-01-08 | Stop reason: HOSPADM

## 2024-01-08 RX ORDER — FENTANYL CITRATE 50 UG/ML
100 INJECTION, SOLUTION INTRAMUSCULAR; INTRAVENOUS
Status: DISCONTINUED | OUTPATIENT
Start: 2024-01-08 | End: 2024-01-08 | Stop reason: HOSPADM

## 2024-01-08 RX ORDER — METHOCARBAMOL 500 MG/1
1000 TABLET, FILM COATED ORAL 4 TIMES DAILY
Status: DISCONTINUED | OUTPATIENT
Start: 2024-01-08 | End: 2024-01-08 | Stop reason: HOSPADM

## 2024-01-08 RX ORDER — HALOPERIDOL 5 MG/ML
0.5 INJECTION INTRAMUSCULAR EVERY 10 MIN PRN
Status: DISCONTINUED | OUTPATIENT
Start: 2024-01-08 | End: 2024-01-08 | Stop reason: HOSPADM

## 2024-01-08 RX ORDER — MUPIROCIN 20 MG/G
1 OINTMENT TOPICAL
Status: COMPLETED | OUTPATIENT
Start: 2024-01-08 | End: 2024-01-08

## 2024-01-08 RX ORDER — ASPIRIN 81 MG/1
81 TABLET ORAL 2 TIMES DAILY
Status: DISCONTINUED | OUTPATIENT
Start: 2024-01-08 | End: 2024-01-09 | Stop reason: HOSPADM

## 2024-01-08 RX ORDER — MUPIROCIN 20 MG/G
1 OINTMENT TOPICAL 2 TIMES DAILY
Status: DISCONTINUED | OUTPATIENT
Start: 2024-01-08 | End: 2024-01-09 | Stop reason: HOSPADM

## 2024-01-08 RX ORDER — HYDROMORPHONE HYDROCHLORIDE 2 MG/1
2 TABLET ORAL EVERY 4 HOURS PRN
Status: DISCONTINUED | OUTPATIENT
Start: 2024-01-08 | End: 2024-01-09 | Stop reason: HOSPADM

## 2024-01-08 RX ORDER — TALC
6 POWDER (GRAM) TOPICAL NIGHTLY PRN
Status: DISCONTINUED | OUTPATIENT
Start: 2024-01-08 | End: 2024-01-08

## 2024-01-08 RX ORDER — ONDANSETRON 2 MG/ML
4 INJECTION INTRAMUSCULAR; INTRAVENOUS EVERY 8 HOURS PRN
Status: DISCONTINUED | OUTPATIENT
Start: 2024-01-08 | End: 2024-01-09 | Stop reason: HOSPADM

## 2024-01-08 RX ORDER — ACETAMINOPHEN 500 MG
1000 TABLET ORAL EVERY 6 HOURS
Status: DISCONTINUED | OUTPATIENT
Start: 2024-01-08 | End: 2024-01-09 | Stop reason: HOSPADM

## 2024-01-08 RX ORDER — TRANEXAMIC ACID 100 MG/ML
INJECTION, SOLUTION INTRAVENOUS
Status: DISCONTINUED | OUTPATIENT
Start: 2024-01-08 | End: 2024-01-08

## 2024-01-08 RX ADMIN — SODIUM CHLORIDE: 0.9 INJECTION, SOLUTION INTRAVENOUS at 11:01

## 2024-01-08 RX ADMIN — ASPIRIN 81 MG: 81 TABLET, COATED ORAL at 08:01

## 2024-01-08 RX ADMIN — HYDROMORPHONE HYDROCHLORIDE 2 MG: 2 TABLET ORAL at 05:01

## 2024-01-08 RX ADMIN — ACETAMINOPHEN 1000 MG: 500 TABLET ORAL at 11:01

## 2024-01-08 RX ADMIN — TRANEXAMIC ACID 1000 MG: 100 INJECTION INTRAVENOUS at 02:01

## 2024-01-08 RX ADMIN — ONDANSETRON 4 MG: 2 INJECTION INTRAMUSCULAR; INTRAVENOUS at 12:01

## 2024-01-08 RX ADMIN — SODIUM CHLORIDE, SODIUM GLUCONATE, SODIUM ACETATE, POTASSIUM CHLORIDE, MAGNESIUM CHLORIDE, SODIUM PHOSPHATE, DIBASIC, AND POTASSIUM PHOSPHATE: .53; .5; .37; .037; .03; .012; .00082 INJECTION, SOLUTION INTRAVENOUS at 01:01

## 2024-01-08 RX ADMIN — FAMOTIDINE 20 MG: 10 INJECTION, SOLUTION INTRAVENOUS at 12:01

## 2024-01-08 RX ADMIN — SODIUM CHLORIDE: 9 INJECTION, SOLUTION INTRAVENOUS at 12:01

## 2024-01-08 RX ADMIN — MUPIROCIN 1 G: 20 OINTMENT TOPICAL at 08:01

## 2024-01-08 RX ADMIN — Medication 30 MG: at 12:01

## 2024-01-08 RX ADMIN — FAMOTIDINE 20 MG: 20 TABLET, FILM COATED ORAL at 08:01

## 2024-01-08 RX ADMIN — VANCOMYCIN HYDROCHLORIDE 1500 MG: 1.5 INJECTION, POWDER, LYOPHILIZED, FOR SOLUTION INTRAVENOUS at 11:01

## 2024-01-08 RX ADMIN — CEFAZOLIN 2 G: 330 INJECTION, POWDER, FOR SOLUTION INTRAMUSCULAR; INTRAVENOUS at 12:01

## 2024-01-08 RX ADMIN — CELECOXIB 400 MG: 200 CAPSULE ORAL at 11:01

## 2024-01-08 RX ADMIN — ACETAMINOPHEN 1000 MG: 500 TABLET ORAL at 05:01

## 2024-01-08 RX ADMIN — DEXAMETHASONE SODIUM PHOSPHATE 8 MG: 4 INJECTION, SOLUTION INTRAMUSCULAR; INTRAVENOUS at 12:01

## 2024-01-08 RX ADMIN — MUPIROCIN 1 G: 20 OINTMENT TOPICAL at 11:01

## 2024-01-08 RX ADMIN — MIDAZOLAM HYDROCHLORIDE 2 MG: 1 INJECTION, SOLUTION INTRAMUSCULAR; INTRAVENOUS at 12:01

## 2024-01-08 RX ADMIN — PROPOFOL 125 MCG/KG/MIN: 10 INJECTION, EMULSION INTRAVENOUS at 12:01

## 2024-01-08 RX ADMIN — SENNOSIDES AND DOCUSATE SODIUM 1 TABLET: 8.6; 5 TABLET ORAL at 08:01

## 2024-01-08 RX ADMIN — LIDOCAINE HYDROCHLORIDE,EPINEPHRINE BITARTRATE 3 ML: 15; .005 INJECTION, SOLUTION EPIDURAL; INFILTRATION; INTRACAUDAL; PERINEURAL at 02:01

## 2024-01-08 RX ADMIN — PREGABALIN 75 MG: 75 CAPSULE ORAL at 08:01

## 2024-01-08 RX ADMIN — PREGABALIN 75 MG: 75 CAPSULE ORAL at 11:01

## 2024-01-08 RX ADMIN — TRANEXAMIC ACID 1000 MG: 100 INJECTION INTRAVENOUS at 12:01

## 2024-01-08 RX ADMIN — OXYCODONE HYDROCHLORIDE 5 MG: 5 TABLET ORAL at 03:01

## 2024-01-08 RX ADMIN — METHOCARBAMOL 750 MG: 750 TABLET ORAL at 08:01

## 2024-01-08 RX ADMIN — MORPHINE SULFATE 2 MG: 2 INJECTION, SOLUTION INTRAMUSCULAR; INTRAVENOUS at 07:01

## 2024-01-08 RX ADMIN — CEFAZOLIN 2 G: 2 INJECTION, POWDER, FOR SOLUTION INTRAMUSCULAR; INTRAVENOUS at 08:01

## 2024-01-08 RX ADMIN — MEPIVACAINE HYDROCHLORIDE 3 ML: 20 INJECTION, SOLUTION EPIDURAL; INFILTRATION at 12:01

## 2024-01-08 RX ADMIN — SODIUM CHLORIDE: 9 INJECTION, SOLUTION INTRAVENOUS at 03:01

## 2024-01-08 NOTE — TRANSFER OF CARE
"Anesthesia Transfer of Care Note    Patient: Aurora Castle    Procedure(s) Performed: Procedure(s) (LRB):  ARTHROPLASTY, KNEE, TOTAL, USING COMPUTER-ASSISTED NAVIGATION: GAGANDEEP: LEFT: NICHOLAS MORGAN (Left)    Patient location: [facility]    Anesthesia Type: CSE and general    Transport from OR: Transported from OR on room air with adequate spontaneous ventilation. Transported from OR on 6-10 L/min O2 by face mask with adequate spontaneous ventilation    Post pain: adequate analgesia    Post assessment: no apparent anesthetic complications    Post vital signs: stable    Level of consciousness: awake    Nausea/Vomiting: no nausea/vomiting    Complications: none    Transfer of care protocol was followed      Last vitals: Visit Vitals  /74   Pulse 63   Temp 36.7 °C (98.1 °F) (Oral)   Resp 18   Ht 5' 10" (1.778 m)   Wt 76.2 kg (168 lb)   LMP  (LMP Unknown)   SpO2 95%   Breastfeeding No   BMI 24.11 kg/m²     "

## 2024-01-08 NOTE — NURSING
Pt arrived to unit via hospital bed from PACU.  Pt aaox4, vss, no s/s of distress noted.  Dressing to left knee cdi.  Accutherm in place.  IVF infusing.  FCDs placed.  Pt instructed to call for assistance when getting up and she verbalized understanding.  Call light placed within reach.

## 2024-01-08 NOTE — PLAN OF CARE
Preop complete.Bonnie hugger on. Pt resting comfortably. No family here at this time. Call light in reach.side rails up, bed in lowest position. Belongings secured in locker.

## 2024-01-08 NOTE — ANESTHESIA PROCEDURE NOTES
CSE    Patient location during procedure: OR  Start time: 1/8/2024 12:38 PM  Timeout: 1/8/2024 12:36 PM  End time: 1/8/2024 12:43 PM      Staffing  Authorizing Provider: Eriberto Cat MD  Performing Provider: Eriberto Cat MD    Staffing  Performed by: Eriberto Cat MD  Authorized by: Eriberto Cat MD    Preanesthetic Checklist  Completed: patient identified, IV checked, site marked, risks and benefits discussed, surgical consent, monitors and equipment checked, pre-op evaluation and timeout performed  CSE  Patient position: sitting  Prep: ChloraPrep  Patient monitoring: heart rate, continuous pulse ox and frequent blood pressure checks  Approach: midline  Spinal Needle  Needle type: Chaparro   Needle gauge: 25 G  Needle length: 5 in  Epidural Needle  Injection technique: THEODORA air  Needle type: Tuohy   Needle gauge: 17 G  Needle length: 3.5 in  Needle insertion depth: 6 cm  Location: L3-4  Needle localization: anatomical landmarks   Catheter  Catheter type: springwound  Catheter size: 19 G  Catheter at skin depth: 11 cm  Additional Documentation: incremental injection, negative aspiration for CSF and negative aspiration for heme  Assessment  Intrathecal Medications:   administered: primary anesthetic mcg of    Medications:    Medications: Intrathecal - mepivacaine 20 mg/mL (2 %) injection - Intrathecal   3 mL - 1/8/2024 12:40:00 PM

## 2024-01-08 NOTE — OP NOTE
Abdi Left TKA  OPERATIVE NOTE    Date of Operation:   1/8/2024    Providers Performing:   Surgeon(s):  Zack Patton III, MD  Assistant: Eriberto Da Silva MD    Operative Procedure:   Left Total Knee Arthroplasty (MAKOplasty) CPT 14274  Computer assisted surgical navigation CPT 0055T    Preoperative Diagnosis:   Left Knee Osteoarthritis, ICD-10 M17.12    Postoperative Diagnosis:   SAME    Components Used:   Christianne  Femur: Triathlon PS Size 4  Tibia: Triathlon all poly tibial baseplate Size 3  Patella: Triathlon conv Symmetric Patella 36 mm    2 packs cement: Simplex    Implant Name Type Inv. Item Serial No.  Lot No. LRB No. Used Action   CEMENT BONE SURG SMPLX P RADPQ - [id number]  CEMENT BONE SURG SMPLX P RADPQ  CHRISTIANNE Bloxr SARWAT. [id number] Left 2 Implanted   COMP FEM POST STAB AUDI SZ 4 LT - [id number]  COMP FEM POST STAB AUDI SZ 4 LT  CHRISTIANNE SALES SARWAT. [id number] Left 1 Implanted   PATELLA TRIATHLON 36X10 SYMTRC - [id number]  PATELLA TRIATHLON 36X10 SYMTRC  CHRISTIANNE Bloxr SARWAT. [id number] Left 1 Implanted   triathlon all poly tibial component ps    CHRISTIANNE [id number] Left 1 Implanted       Anesthesia:   Spinal+catheter    MG cocktail: AVA    Estimated Blood Loss:   350 cc    Specimens:   None    Complications:   None    Indications:   The patient has failed non-operative measures including medications, injections and activity modifications for their knee.  After an explanation of risks and benefits of knee arthroplasty surgery, the patient wishes to proceed with surgery.    Operative Notes:   The patient was greeted in the pre-op holding area.  The patients knee was marked with a surgical marker by the surgeon.  Spinal-Epidural anesthesia was administered by the anesthesia team.  The patient was placed in the supine position on the OR table with all bony prominences padded.  A tourniquet was not used.  IV antibiotics were administered.  The leg was prepped and draped in the usual sterile fashion.  A timeout  was performed and the correct patient, site and procedure were identified.    The femoral and tibial guide pins where placed with pre-drilling and the arrays were positioned and tightened to the pins.     A midline incision was made with a standard medial parapatellar arthrotomy.  The standard medial capsular release was performed along with the lateral gutter.  The patella was mobilized from the lateral parapatellar ligament and bony osteophytes were removed.  The intercondylar notch was cleared of the ACL and PCL.    The hip was then brought through a range of motion and the hip center was identified, medial and lateral malleolus were identified and then began the registration process femur was registered first. The tibia was then registered. We were satisfied with the registration about the femoral and tibial size, it corresponded well on CT scan. Osteophytes were removed. We checked our alignment.     The joint was tensioned in extension and at 90 degrees of flexion to define our gaps. Working with the Christianne Robot Tech, the implants were positioned virtually for appropriate size gaps and implant placement.     At this point, the [employer] robotic arm was brought in. It was registered. We cut the femur and tibia. Care was taken during the burring process to protect the soft tissue.  Meniscal remnants were removed following burring.  The knee was brought into flexion and posterior osteophytes were removed.      At this time the trial implants were placed and knee assessed for stability, and flexion arc. The patella was prepared using free-hand technique and the three-holed lug drill guide was sized and appropriately assessed. Patella tracking was found to be acceptable. The tibial component rotation was marked. The trials were removed. The tibial component was positioned and the keel was drilled and punched.    The wound was copiously irrigated with pulsitile lavage and the bony surfaces dried.  Cement was mixed on  the back table and applied to all components.  Cementation of the femoral, tibial and patellar components was performed sequentially with removal of all excess cement. While the cement dried a 0.35% betadine solution was left to soak in the surgical field.     Hemostasis was obtained with bovie electrocautery.  The knee was again copiously irrigated with pulsatile lavage.     1 gram of vancomycin powder was placed in the knee. The arthrotomy was closed with interrupted #1 vicryl suture and #2 quill, the subcuticular layer was closed with 2-0 vicryl suture and a running 4-0 monocryl was used to closed the skin surface.  Pin sites were closed with 4-0 monocryl and skin glue. Surgicel sealant was placed over the top of the incision and sterile dressing placed over the wound. Appropriately sized TEDs hose were placed for edema control.     Sponge and needle counts were correct.    The first-assistant was critical to all steps of the operation, including retraction and leg stabilization during exposure and bone preparation, as well as the deep and superficial wound closure.  Dr. Patton performed and/or supervised the key portions of this surgical procedure, including evaluation of the bone cuts, reshaping of the bony elements, and insertion of the provisional and final components.    Postoperative Plan:  The patient will be anticoagulated with 81mg aspirin twice daily for one month.   They will receive 24 hours of post-operative antibiotics.    Activity will be weight bearing as tolerated.    Dilaudid 2/4 instead of oxy 5/10  Lyrica 75mg BID  OP PT at [facility]    Due patient and or surgical factors the patient will receive an Rx for cefadroxil 500mg BID x7 days after discharge per [place] data:   Sae MM, Miller FLORES, Trudy BORJA, Asher MCCLELLAN. [facility] Clinical Research Award: Extended Oral Antibiotics Prevent Periprosthetic Joint Infection in High-Risk Cases: 3855 Patients With 1-Year Follow-Up. J Arthroplasty. 2021  Jul;36(7S):S18-S25. doi: 10.1016/j.arth.2021.01.051. Epub 2021 Jan 23. PMID: [id number].      Signed by: Zack Patton III, MD

## 2024-01-08 NOTE — NURSING TRANSFER
Nursing Transfer Note      1/8/2024   4:05 PM    Nurse giving handoff:TANNA Agarwal  Nurse receiving handoff:TANNA Gomes    Reason patient is being transferred: Extended Recovery     Transfer : PACU to Extended Recovery Suites    Transfer via hospital bed    Transfer with IV pole    Transported by TANNA Agarwal     Transfer Vital Signs:  Blood Pressure:124/59  Heart Rate:53  O2:98%  Temperature:97.7  Respirations:18    Telemetry: no    Medicines sent: n/a    Any special needs or follow-up needed: none    Patient belongings transferred with patient: yes    Chart send with patient: yes    Notified: TANNA Gomes     Patient reassessed at: 1523; 1/8/2024

## 2024-01-08 NOTE — PT/OT/SLP PROGRESS
Physical Therapy      Patient Name:  Aurora Castle   MRN:  [id number]    Patient not seen today secondary to with OT on 1st attempt and eating dinner on 2nd attempt. Will follow-up again in AM.

## 2024-01-08 NOTE — ASSESSMENT & PLAN NOTE
Aurora ARTEM EsparzaTin is a 78 y.o. female s/p L TKA      - Weight bearing status: WBAT  - Pain control: Per APS  - Antibiotics: Ancef  - DVT Prophylaxis: ASA , SCD's at all times when not ambulating.  - PT/OT      - Dispo: PT/OT, home

## 2024-01-08 NOTE — NURSING
Nurses Note -- 4 Eyes      1/8/2024   5:29 PM      Skin assessed during: Admit      [x] No Altered Skin Integrity Present    [x]Prevention Measures Documented      [] Yes- Altered Skin Integrity Present or Discovered   [] LDA Added if Not in Epic (Describe Wound)   [] New Altered Skin Integrity was Present on Admit and Documented in LDA   [] Wound Image Taken    Wound Care Consulted? No    Attending Nurse:   Mireya Saenz RN/Staff Member:  Courtney

## 2024-01-08 NOTE — SUBJECTIVE & OBJECTIVE
"Principal Problem:Primary osteoarthritis of left knee    Principal Orthopedic Problem: Same    Interval History: Pt seen and examined at bedside. NAEON. Pain controlled. PT today.  VSS, AF. No other concerns stated. Urinating.     Review of patient's allergies indicates:   Allergen Reactions    Erythromycin Itching and Swelling     Eye swelling   Local eye ointment       Current Facility-Administered Medications   Medication    0.9%  NaCl infusion    acetaminophen tablet 1,000 mg    ceFAZolin 2 g in dextrose 5 % in water (D5W) 50 mL IVPB (MB+)    ceFAZolin 2 g in sodium chloride 0.9 % 50 mL IVPB (MB+)    fentaNYL 50 mcg/mL injection 100 mcg    fentaNYL 50 mcg/mL injection 25 mcg    haloperidol lactate injection 0.5 mg    HYDROmorphone tablet 2 mg    HYDROmorphone tablet 4 mg    LIDOcaine (PF) 10 mg/ml (1%) injection 10 mg    melatonin tablet 6 mg    methocarbamoL tablet 1,000 mg    midazolam (VERSED) 1 mg/mL injection 4 mg    morphine injection 2 mg    naloxone 0.4 mg/mL injection 0.02 mg    ondansetron injection 4 mg    oxyCODONE immediate release tablet 5 mg    pregabalin capsule 75 mg    prochlorperazine injection Soln 5 mg    sodium chloride 0.9% flush 10 mL    sodium chloride 0.9% flush 3 mL    tranexamic acid (CYKLOKAPRON) 1,000 mg in sodium chloride 0.9 % 100 mL IVPB (MB+)    tranexamic acid (CYKLOKAPRON) 1,000 mg in sodium chloride 0.9 % 100 mL IVPB (MB+)    tranexamic acid (CYKLOKAPRON) 3,000 mg in sodium chloride 0.9% SolP 100 mL    [START ON 1/9/2024] valsartan tablet 80 mg     Objective:     Vital Signs (Most Recent):  Temp: 97.7 °F (36.5 °C) (01/08/24 1523)  Pulse: (!) 53 (01/08/24 1545)  Resp: 18 (01/08/24 1545)  BP: (!) 124/59 (01/08/24 1545)  SpO2: 98 % (01/08/24 1545) Vital Signs (24h Range):  Temp:  [97.7 °F (36.5 °C)-98.1 °F (36.7 °C)] 97.7 °F (36.5 °C)  Pulse:  [53-77] 53  Resp:  [16-20] 18  SpO2:  [95 %-98 %] 98 %  BP: (106-135)/(57-74) 124/59     Weight: 76.2 kg (168 lb)  Height: 5' 10" (177.8 " cm)  Body mass index is 24.11 kg/m².      Intake/Output Summary (Last 24 hours) at 1/8/2024 1610  Last data filed at 1/8/2024 1344  Gross per 24 hour   Intake 1800 ml   Output --   Net 1800 ml        Ortho/SPM Exam    Vitals: Afebrile.  Vital signs stable.  General: No acute distress.  Cardio: Regular rate.  Chest: No increased work of breathing.    Left Lower Extremity Exam    - Dressing c/d/i  - Quad and hip flexor intact  - Compartments soft and compressible  - ROM full (eversion,inversion,plantar/dorsiflexion)  - TA/EHL/Gastroc/FHL assessed in isolation without deficit  - SILT throughout  - DP and PT palpated  2+  - Capillary Refill <3s        Significant Labs:   Recent Lab Results       None          All pertinent labs within the past 24 hours have been reviewed.    Significant Imaging: I have reviewed all pertinent imaging results/findings.

## 2024-01-08 NOTE — ANESTHESIA POSTPROCEDURE EVALUATION
Anesthesia Post Evaluation    Patient: Aurora Castle    Procedure(s) Performed: Procedure(s) (LRB):  ARTHROPLASTY, KNEE, TOTAL, USING COMPUTER-ASSISTED NAVIGATION: GAGANDEEP: LEFT: NICHOLAS - EDDIE (Left)    Final Anesthesia Type: CSE      Patient location during evaluation: PACU  Patient participation: Yes- Able to Participate  Level of consciousness: awake and alert and oriented  Post-procedure vital signs: reviewed and stable  Pain management: adequate  Airway patency: patent  EDMUNDO mitigation strategies: Multimodal analgesia  PONV status at discharge: No PONV  Anesthetic complications: no      Cardiovascular status: blood pressure returned to baseline and hemodynamically stable  Respiratory status: unassisted, spontaneous ventilation and room air  Hydration status: euvolemic  Follow-up not needed.              Vitals Value Taken Time   /70 01/08/24 1619   Temp 36.5 °C (97.7 °F) 01/08/24 1619   Pulse 47 01/08/24 1619   Resp 18 01/08/24 1619   SpO2 97 % 01/08/24 1619         Event Time   Out of Recovery 16:04:00         Pain/Shena Score: Pain Rating Prior to Med Admin: 2 (1/8/2024  3:14 PM)  Shena Score: 9 (1/8/2024  3:23 PM)

## 2024-01-08 NOTE — PLAN OF CARE
Problem: Pain Acute  Goal: Acceptable Pain Control and Functional Ability  Intervention: Develop Pain Management Plan  Flowsheets (Taken 1/8/2024 1727)  Pain Management Interventions:   care clustered   cold applied   pain management plan reviewed with patient/caregiver   quiet environment facilitated     Problem: Pain Acute  Goal: Acceptable Pain Control and Functional Ability  Intervention: Prevent or Manage Pain  Flowsheets (Taken 1/8/2024 1727)  Sensory Stimulation Regulation:   care clustered   lighting decreased   quiet environment promoted  Medication Review/Management: medications reviewed     Problem: Pain Acute  Goal: Acceptable Pain Control and Functional Ability  Intervention: Optimize Psychosocial Wellbeing  Flowsheets (Taken 1/8/2024 1727)  Supportive Measures:   active listening utilized   positive reinforcement provided     Problem: Adjustment to Surgery (Knee Arthroplasty)  Goal: Optimal Coping  Intervention: Support Psychosocial Response to Surgery and Mobility Changes  Flowsheets (Taken 1/8/2024 1727)  Supportive Measures:   active listening utilized   positive reinforcement provided     Problem: Bleeding (Knee Arthroplasty)  Goal: Absence of Bleeding  Intervention: Monitor and Manage Bleeding  Flowsheets (Taken 1/8/2024 1727)  Bleeding Management: dressing monitored     Problem: Infection (Knee Arthroplasty)  Goal: Absence of Infection Signs and Symptoms  Intervention: Prevent or Manage Infection  Flowsheets (Taken 1/8/2024 1727)  Infection Management: aseptic technique maintained     Problem: Pain (Knee Arthroplasty)  Goal: Acceptable Pain Control  Intervention: Prevent or Manage Pain  Flowsheets (Taken 1/8/2024 1727)  Pain Management Interventions:   care clustered   cold applied   pain management plan reviewed with patient/caregiver   quiet environment facilitated     Problem: Fall Injury Risk  Goal: Absence of Fall and Fall-Related Injury  Intervention: Identify and Manage  Contributors  Flowsheets (Taken 1/8/2024 1727)  Medication Review/Management: medications reviewed     Problem: Fall Injury Risk  Goal: Absence of Fall and Fall-Related Injury  Intervention: Promote Injury-Free Environment  Flowsheets (Taken 1/8/2024 1727)  Safety Promotion/Fall Prevention:   assistive device/personal item within reach   Fall Risk reviewed with patient/family   lighting adjusted   medications reviewed   nonskid shoes/socks when out of bed   side rails raised x 2   instructed to call staff for mobility   Plan of care reviewed with pt, verbalized understanding. Medications and possible side effects reviewed and administered as ordered.  Purposeful rounding completed.  Safety precautions maintained.  Call light placed within reach.

## 2024-01-08 NOTE — ANESTHESIA PREPROCEDURE EVALUATION
01/08/2024  Aurora Castle is a 78 y.o., female.  [facility]  Anesthesia Pre-Operative Evaluation       Patient Name: Aurora Castle  YOB: 1945  MRN: [id number]  [facility]: [id number]      Code Status: No Order   Date of Procedure: 1/8/2024  Anesthesia: Regional Procedure: Procedure(s) (LRB):  ARTHROPLASTY, KNEE, TOTAL, USING COMPUTER-ASSISTED NAVIGATION: GAGANDEEP: LEFT: NICHOLAS - TRIATHALON (Left)  Pre-Operative Diagnosis: Primary osteoarthritis of left knee [M17.12]  Proceduralist: Surgeon(s) and Role:     * Zack Patton III, MD - Primary        SUBJECTIVE:   Aurora Castle is a 78 y.o. female who  has a past medical history of Arthritis, History of adenomatous polyp of colon, Hypertension, Joint pain, Pneumonia, Primary osteoarthritis of right knee (02/22/2016), Tendonitis of ankle or foot, Trouble in sleeping, and Urinary incontinence. No notes on file    Anticoagulants   Medication Route Frequency       she has a current medication list which includes the following long-term medication(s): fish oil-omega-3 fatty acids, valsartan, aspirin, pantoprazole, and pregabalin.   ALLERGIES:     Review of patient's allergies indicates:   Allergen Reactions    Erythromycin Itching and Swelling     Eye swelling   Local eye ointment     LDA:          Lines/Drains/Airways       Peripheral Intravenous Line  Duration                  Peripheral IV - Single Lumen 01/08/24 1107 18 G Left Hand <1 day                  MEDICATIONS:     Antibiotics (From admission, onward)      Start     Stop Route Frequency Ordered    01/08/24 1032  ceFAZolin 2 g in dextrose 5 % in water (D5W) 50 mL IVPB (MB+)  (MEDICATIONS - ANTIBIOTICS NO PENICILLIN ALLERGY TOTAL KNEE PATHWAY PRE-OP)         -- IV On Call Procedure 01/08/24 1032    01/08/24 1032  vancomycin 1,500 mg in dextrose 5 % (D5W) 250 mL IVPB  (Vial-Mate)  (MEDICATIONS - ANTIBIOTICS (VANCOMYCIN AND CLINDAMYCIN) FOR PCN ALLERGY OR KNOWN COLONIZATION WITH MRSA TOTAL KNEE PATHWAY PRE-OP)         -- IV On Call Procedure 24 1032          VTE Risk Mitigation (From admission, onward)           Ordered     Place foot compression device  Until discontinued         24 1032                  Current Facility-Administered Medications   Medication Dose Route Frequency Provider Last Rate Last Admin    ceFAZolin 2 g in dextrose 5 % in water (D5W) 50 mL IVPB (MB+)  2 g Intravenous On Call Procedure Wilmar Padilla PA-C        fentaNYL 50 mcg/mL injection 100 mcg  100 mcg Intravenous PRN Wilmar Padilla PA-C        LIDOcaine (PF) 10 mg/ml (1%) injection 10 mg  1 mL Intradermal On Call Procedure Wilmar Padilla PA-C        midazolam (VERSED) 1 mg/mL injection 4 mg  4 mg Intravenous PRN iWlmar Padilla PA-C        tranexamic acid (CYKLOKAPRON) 1,000 mg in sodium chloride 0.9 % 100 mL IVPB (MB+)  1,000 mg Intravenous On Call Procedure Wilmar Padilla PA-C        tranexamic acid (CYKLOKAPRON) 1,000 mg in sodium chloride 0.9 % 100 mL IVPB (MB+)  1,000 mg Intravenous On Call Procedure Wilmar Padilla PA-C        tranexamic acid (CYKLOKAPRON) 3,000 mg in sodium chloride 0.9% SolP 100 mL  3,000 mg Irrigation On Call Procedure Wilmar Padilla PA-C        vancomycin 1,500 mg in dextrose 5 % (D5W) 250 mL IVPB (Vial-Mate)  1,500 mg Intravenous On Call Procedure Wilmar Padilla PA-C 166.7 mL/hr at 24 1110 1,500 mg at 24 1110          History:   There are no hospital problems to display for this patient.    Surgical History:    has a past surgical history that includes tendon surgery L foot; hemorrhiodectomy; Colonoscopy (N/A, 2018);  section; and Total knee arthroplasty (Right, 2020).   Social History:    reports being sexually active and has had partner(s) who are male.  reports that she has never smoked. She has never used  smokeless tobacco. She reports current alcohol use of about 1.0 - 2.0 standard drink of alcohol per week. She reports that she does not use drugs.     OBJECTIVE:     Vital Signs (Most Recent):  Temp: 36.7 °C (98.1 °F) (01/08/24 1057)  Pulse: 63 (01/08/24 1100)  Resp: 18 (01/08/24 1100)  BP: 135/74 (01/08/24 1058)  SpO2: 95 % (01/08/24 1100) Vital Signs Range (Last 24H):  Temp:  [36.7 °C (98.1 °F)]   Pulse:  [63-64]   Resp:  [18-20]   BP: (135)/(74)   SpO2:  [95 %]        Body mass index is 24.11 kg/m².   Wt Readings from Last 4 Encounters:   01/08/24 76.2 kg (168 lb)   12/30/23 80 kg (176 lb 5.9 oz)   12/21/23 80 kg (176 lb 5.9 oz)   12/21/23 80.3 kg (177 lb)     Significant Labs:  Lab Results   Component Value Date    WBC 5.68 12/21/2023    HGB 13.2 12/21/2023    HCT 37.5 12/21/2023     12/21/2023     12/21/2023    K 4.3 12/21/2023     12/21/2023    CREATININE 0.7 12/21/2023    BUN 21 12/21/2023    CO2 24 12/21/2023    GLU 98 12/21/2023    CALCIUM 9.4 12/21/2023    ALKPHOS 63 12/21/2023    ALT 17 12/21/2023    AST 22 12/21/2023    ALBUMIN 4.1 12/21/2023    INR 1.0 12/21/2023    HGBA1C 6.1 03/04/2013     No LMP recorded (lmp unknown). Patient is postmenopausal.  No results found for this or any previous visit (from the past 72 hour(s)).    EKG:   Results for orders placed or performed during the hospital encounter of 12/21/23   EKG 12-lead    Collection Time: 12/21/23 12:31 PM    Narrative    Test Reason : Z01.818,    Vent. Rate : 051 BPM     Atrial Rate : 051 BPM     P-R Int : 152 ms          QRS Dur : 076 ms      QT Int : 446 ms       P-R-T Axes : 078 056 063 degrees     QTc Int : 411 ms    Sinus bradycardia  Otherwise normal ECG  When compared with ECG of 02-JUN-2020 10:05,  No significant change was found  Confirmed by Maura Moctezuma MD ([id number]) on 12/21/2023 1:44:44 PM    Referred By: VIJI MUÑOZ           Confirmed By:Maura Moctezuma MD       ASSESSMENT/PLAN:         Pre-op Assessment           Review of Systems  Anesthesia Hx:  No problems with previous Anesthesia                Social:  Non-Smoker, Social Alcohol Use       Cardiovascular:  Exercise tolerance: good   Hypertension                                        Hepatic/GI:      Denies GERD.             Musculoskeletal:  Arthritis               Endocrine:  Denies Diabetes.               Physical Exam  General: Well nourished    Airway:  Mallampati: II   Mouth Opening: Normal  TM Distance: Normal    Dental:  Intact    Chest/Lungs:  Normal Respiratory Rate    Heart:  Rate: Normal  Rhythm: Regular Rhythm        Anesthesia Plan  Type of Anesthesia, risks & benefits discussed:    Anesthesia Type: Gen ETT, CSE, Spinal  Intra-op Monitoring Plan: Standard ASA Monitors  Post Op Pain Control Plan: multimodal analgesia and IV/PO Opioids PRN  Informed Consent: Informed consent signed with the Patient and all parties understand the risks and agree with anesthesia plan.  All questions answered.   ASA Score: 2    Ready For Surgery From Anesthesia Perspective.     .

## 2024-01-09 ENCOUNTER — PATIENT MESSAGE (OUTPATIENT)
Dept: ORTHOPEDICS | Facility: CLINIC | Age: 79
End: 2024-01-09
Payer: MEDICARE

## 2024-01-09 ENCOUNTER — PATIENT MESSAGE (OUTPATIENT)
Dept: ADMINISTRATIVE | Facility: OTHER | Age: 79
End: 2024-01-09
Payer: MEDICARE

## 2024-01-09 VITALS
OXYGEN SATURATION: 97 % | WEIGHT: 168 LBS | BODY MASS INDEX: 24.05 KG/M2 | RESPIRATION RATE: 16 BRPM | TEMPERATURE: 98 F | DIASTOLIC BLOOD PRESSURE: 65 MMHG | HEART RATE: 58 BPM | SYSTOLIC BLOOD PRESSURE: 154 MMHG | HEIGHT: 70 IN

## 2024-01-09 PROCEDURE — 97116 GAIT TRAINING THERAPY: CPT

## 2024-01-09 PROCEDURE — 25000003 PHARM REV CODE 250: Performed by: ORTHOPAEDIC SURGERY

## 2024-01-09 PROCEDURE — 97161 PT EVAL LOW COMPLEX 20 MIN: CPT

## 2024-01-09 PROCEDURE — 63600175 PHARM REV CODE 636 W HCPCS: Performed by: ORTHOPAEDIC SURGERY

## 2024-01-09 PROCEDURE — 25000003 PHARM REV CODE 250: Performed by: SURGERY

## 2024-01-09 PROCEDURE — 99900035 HC TECH TIME PER 15 MIN (STAT)

## 2024-01-09 PROCEDURE — 97535 SELF CARE MNGMENT TRAINING: CPT

## 2024-01-09 PROCEDURE — 97530 THERAPEUTIC ACTIVITIES: CPT

## 2024-01-09 PROCEDURE — 99499 UNLISTED E&M SERVICE: CPT | Mod: FS,,, | Performed by: ANESTHESIOLOGY

## 2024-01-09 PROCEDURE — 97110 THERAPEUTIC EXERCISES: CPT

## 2024-01-09 PROCEDURE — 94761 N-INVAS EAR/PLS OXIMETRY MLT: CPT

## 2024-01-09 PROCEDURE — 25000003 PHARM REV CODE 250: Performed by: STUDENT IN AN ORGANIZED HEALTH CARE EDUCATION/TRAINING PROGRAM

## 2024-01-09 PROCEDURE — 25000003 PHARM REV CODE 250

## 2024-01-09 RX ORDER — CELECOXIB 200 MG/1
200 CAPSULE ORAL DAILY
Status: DISCONTINUED | OUTPATIENT
Start: 2024-01-09 | End: 2024-01-09 | Stop reason: HOSPADM

## 2024-01-09 RX ADMIN — ACETAMINOPHEN 1000 MG: 500 TABLET ORAL at 06:01

## 2024-01-09 RX ADMIN — SENNOSIDES AND DOCUSATE SODIUM 1 TABLET: 8.6; 5 TABLET ORAL at 08:01

## 2024-01-09 RX ADMIN — MUPIROCIN 1 G: 20 OINTMENT TOPICAL at 08:01

## 2024-01-09 RX ADMIN — FAMOTIDINE 20 MG: 20 TABLET, FILM COATED ORAL at 08:01

## 2024-01-09 RX ADMIN — VALSARTAN 80 MG: 80 TABLET, FILM COATED ORAL at 08:01

## 2024-01-09 RX ADMIN — METHOCARBAMOL 750 MG: 750 TABLET ORAL at 08:01

## 2024-01-09 RX ADMIN — SODIUM CHLORIDE: 9 INJECTION, SOLUTION INTRAVENOUS at 12:01

## 2024-01-09 RX ADMIN — ASPIRIN 81 MG: 81 TABLET, COATED ORAL at 08:01

## 2024-01-09 RX ADMIN — ACETAMINOPHEN 1000 MG: 500 TABLET ORAL at 12:01

## 2024-01-09 RX ADMIN — PREGABALIN 75 MG: 75 CAPSULE ORAL at 08:01

## 2024-01-09 RX ADMIN — HYDROMORPHONE HYDROCHLORIDE 2 MG: 2 TABLET ORAL at 08:01

## 2024-01-09 RX ADMIN — POLYETHYLENE GLYCOL 3350 17 G: 17 POWDER, FOR SOLUTION ORAL at 08:01

## 2024-01-09 RX ADMIN — CEFAZOLIN 2 G: 2 INJECTION, POWDER, FOR SOLUTION INTRAMUSCULAR; INTRAVENOUS at 05:01

## 2024-01-09 RX ADMIN — CELECOXIB 200 MG: 200 CAPSULE ORAL at 08:01

## 2024-01-09 NOTE — PROGRESS NOTES
Acute Pain Service and Perioperative Surgical Home Progress Note    Interval history  Aurora Castle is a 78 y.o., female,     Surgery:  Procedure(s) (LRB):  ARTHROPLASTY, KNEE, TOTAL, USING COMPUTER-ASSISTED NAVIGATION: GAGANDEEP: LEFT: NICHOLAS - EDDIE (Left)    Post Op Day #: 1    Problem List:    Active Hospital Problems    Diagnosis  POA    *Primary osteoarthritis of left knee [M17.12]  Yes      Resolved Hospital Problems   No resolved problems to display.       Subjective:       General appearance of alert, oriented, no complaints   Pain with rest: 2    Numbers   Pain with movement: 2    Numbers   Side Effects    1. Pruritis No    2. Nausea No    3. Motor Blockade No, 0=Ability to raise lower extremities off bed    4. Sedation Yes, 1=awake and alert    Schedule Medications:    acetaminophen  1,000 mg Oral Q6H    aspirin  81 mg Oral BID    ceFAZolin (ANCEF) IVPB  2 g Intravenous Q8H    celecoxib  200 mg Oral Daily    famotidine  20 mg Oral BID    methocarbamoL  750 mg Oral TID    mupirocin  1 g Nasal BID    polyethylene glycol  17 g Oral Daily    pregabalin  75 mg Oral BID    senna-docusate 8.6-50 mg  1 tablet Oral BID    valsartan  80 mg Oral Daily        Continuous Infusions:   sodium chloride 0.9% 150 mL/hr at 01/09/24 0009        PRN Medications:  bisacodyL, HYDROmorphone, HYDROmorphone, morphine, naloxone, ondansetron, prochlorperazine       Antibiotics:  Antibiotics (From admission, onward)      Start     Stop Route Frequency Ordered    01/08/24 2100  ceFAZolin 2 g in sodium chloride 0.9 % 50 mL IVPB (MB+)  (Post-op Antibiotics - No PCN allergy or mild to moderate PCN allergy)         01/09/24 1259 IV Every 8 hours (non-standard times) 01/08/24 1509    01/08/24 2100  mupirocin 2 % ointment 1 g         01/13/24 2059 Nasl 2 times daily 01/08/24 1632               Objective:         Vital Signs (Most Recent):  Temp: 97.8 °F (36.6 °C) (01/09/24 0400)  Pulse: 67 (01/09/24 0400)  Resp: 18 (01/09/24 0400)  BP:  "129/63 (01/09/24 0400)  SpO2: 95 % (01/09/24 0400) Vital Signs Range (Last 24H):  Temp:  [97.7 °F (36.5 °C)-98.2 °F (36.8 °C)]   Pulse:  [47-77]   Resp:  [16-20]   BP: (106-161)/(57-80)   SpO2:  [95 %-98 %]          I & O (Last 24H):  Intake/Output Summary (Last 24 hours) at 1/9/2024 0514  Last data filed at 1/8/2024 2342  Gross per 24 hour   Intake 2865 ml   Output 850 ml   Net 2015 ml       Physical Exam:    GA: Alert, comfortable, no acute distress.   Pulmonary: Clear to auscultation . Normal respiratory ratei.  Cardiac: regular rate and rhythm.          Laboratory: reviewed in chart  CBC: No results for input(s): "WBC", "RBC", "HGB", "HCT", "PLT", "MCV", "MCH", "MCHC" in the last 72 hours.    BMP: No results for input(s): "NA", "K", "CO2", "CL", "BUN", "CREATININE", "GLU", "MG", "PHOS", "CALCIUM" in the last 72 hours.    No results for input(s): "PT", "INR", "PROTIME", "APTT" in the last 72 hours.          Assessment:         Pain control adequate    Plan:  1) Pain: Multimodal pain regimen ordered which includes acetaminophen, celecoxib, pregabalin, and prn oxycodone.  Well controlled on current regimen.  Will continue to monitor.    2) HTN: continue home regimen   3) FEN/GI: Tolerating regular diet.     4) Dispo: Pt working well with PT/OT. Case management and SW following along for setting up home health and physical therapy. Plan to discharge home this am.              Evaluator Nicolasa John PA-C                "

## 2024-01-09 NOTE — PLAN OF CARE
[facility]  Discharge Final Note    Primary Care Provider: Isabelle Vincent MD    Expected Discharge Date: 1/9/2024    Final Discharge Note (most recent)       Final Note - 01/09/24 1545          Final Note    Assessment Type Final Discharge Note     Anticipated Discharge Disposition Home or Self Care     What phone number can be called within the next 1-3 days to see how you are doing after discharge? --   [phone]                    Important Message from Medicare           Future Appointments   Date Time Provider Department Center   1/10/2024 11:00 AM TELEMED NURSE, [facility] [place]   1/23/2024 10:00 AM Raquel Dumont NP [facility] [address] [place]   2/20/2024 11:40 AM Zack Patton III, MD [facility] [address]   4/2/2024 11:40 AM Zack Patton III, MD [facility] [place]     Patient discharged home to care of family on 1/9/24.    Rosibel Singer RNCM  Case Management  [facility]  [phone]

## 2024-01-09 NOTE — DISCHARGE SUMMARY
[facility])  Orthopedics  Discharge Summary      Patient Name: Aurora Castle  MRN: [id number]  Admission Date: 1/8/2024  Hospital Length of Stay: 0 days  Discharge Date and Time:  01/09/2024 7:58 AM  Attending Physician: Zack Patton III, MD   Discharging Provider: Eriberto Da Silva MD  Primary Care Provider: Isabelle Vincent MD    HPI: Aurora Castle is a 78 y.o. female with history of Left knee pain. Pain is worse with activity and weight bearing.  Patient has experienced interference of activities of daily living due to decreased range of motion and an increase in joint pain and swelling.  Patient has failed non-operative treatment including NSAIDs, corticosteroid injections, viscosupplement injections, and activity modification.  Aurora Castle currently ambulates independently.      Relevant medical conditions of significance in perioperative period:  None    Procedure(s) (LRB):  ARTHROPLASTY, KNEE, TOTAL, USING COMPUTER-ASSISTED NAVIGATION: GAGANDEEP: LEFT: NICHOLAS - EDDIE (Left)      Hospital Course: Patient presented for above procedure.  Tolerated it well and was discharged home POD 1 after voiding, tolerating diet, ambulating, pain controlled.  Discharge instructions, follow-up appointment, and med rec are below.     Consults (From admission, onward)          Status Ordering Provider     Inpatient consult to Social Work  Once        Provider:  (Not yet assigned)    Acknowledged AMARJIT MACK     Inpatient consult to Pain Management  Once        Provider:  (Not yet assigned)    Acknowledged AMARJIT MACK     Inpatient consult to Respiratory Care  Once        Provider:  (Not yet assigned)    Acknowledged AMARJIT MACK            Significant Diagnostic Studies: No pertinent studies.    Pending Diagnostic Studies:       None          Final Active Diagnoses:    Diagnosis Date Noted POA    PRINCIPAL PROBLEM:  Primary osteoarthritis of left knee [M17.12] 12/21/2023 Yes       Problems Resolved During this Admission:      Discharged Condition: stable    Disposition: Home or Self Care    Follow Up:    Patient Instructions:      Activity as tolerated     Sponge bath only until clinic visit     Keep surgical extremity elevated     Lifting restrictions   Order Comments: No strenuous exercise or lifting of > 10 lbs     Weight bearing as tolerated     No driving, operating heavy equipment or signing legal documents while taking pain medication     Leave dressing on - Keep it clean, dry, and intact until clinic visit   Order Comments: Do not remove surgical dressing for 2 weeks post-op. This will be done only by MD at initial post-op visit. If dressing is completely saturated, replace with identical dressing - silver-impregnated hydrocolloid dressing.     Do not get dressings wet. Do not shower.     If dressing continues to be saturated or there are signs of infection, please call [facility] [phone] for further instructions and to make appt to be seen.     Call MD for:  temperature >100.4     Call MD for:  persistent nausea and vomiting     Call MD for:  severe uncontrolled pain     Call MD for:  difficulty breathing, headache or visual disturbances     Call MD for:  redness, tenderness, or signs of infection (pain, swelling, redness, odor or green/yellow discharge around incision site)     Call MD for:  hives     Call MD for:  persistent dizziness or light-headedness     Call MD for:  extreme fatigue     Medications:  Reconciled Home Medications:      Medication List        CONTINUE taking these medications      acetaminophen 650 MG Tbsr  Commonly known as: TYLENOL  Take 1 tablet (650 mg total) by mouth every 8 (eight) hours.     ascorbic acid (vitamin C) 1000 MG tablet  Commonly known as: VITAMIN C  Take 1 tablet by mouth Daily. Hold medication one week prior to surgery     aspirin 81 MG EC tablet  Commonly known as: ECOTRIN  Take 1 tablet (81 mg total) by mouth 2 (two) times a day.      BIOTIN ORAL  Take 5,000 mcg by mouth once daily. Hold medication one week prior to surgery     cefadroxil 500 MG Cap  Commonly known as: DURICEF  Take 1 capsule (500 mg total) by mouth every 12 (twelve) hours.     celecoxib 200 MG capsule  Commonly known as: CeleBREX  Take 1 capsule (200 mg total) by mouth once daily.     cholecalciferol (vitamin D3) 125 mcg (5,000 unit) Tab  Commonly known as: VITAMIN D3  Take 1 tablet by mouth once daily.     fish oil-omega-3 fatty acids 300-1,000 mg capsule  Take 2 g by mouth once daily. Hold medication one week prior to surgery     glucosamine-chondroit-vit C-Mn 276-441-73-2.5 mg Cap  Take 1 capsule by mouth once daily. Hold medication one week prior to surgery     HYDROmorphone 2 MG tablet  Commonly known as: DILAUDID  Take 1-2 tablets every 3-4 hours as needed for pain     KENALOG 40 mg/mL injection  Generic drug: triamcinolone acetonide     MELATONIN ORAL  Take by mouth every evening. TAKES AT NIGHT     methocarbamoL 750 MG Tab  Commonly known as: ROBAXIN  Take 1 tablet (750 mg total) by mouth 4 (four) times daily as needed (muscle spasms).     MULTIVITAMIN, MINERAL FORMULA tablet  Generic drug: geriatric multivitamin-min  Take 1 tablet by mouth Daily. Hold medication one week prior to surgery     pantoprazole 40 MG tablet  Commonly known as: PROTONIX  Take 1 tablet (40 mg total) by mouth once daily.     pregabalin 75 MG capsule  Commonly known as: LYRICA  Take 1 capsule (75 mg total) by mouth 2 (two) times daily.     promethazine-dextromethorphan 6.25-15 mg/5 mL Syrp  Commonly known as: PROMETHAZINE-DM  Take 5 mLs by mouth every 4 (four) hours as needed. For cough/congestion     senna-docusate 8.6-50 mg 8.6-50 mg per tablet  Commonly known as: SENNA WITH DOCUSATE SODIUM  Take 1 tablet by mouth once daily.     UNISOM (DOXYLAMINE) ORAL  Take by mouth every evening. TAKES AT NIGHT     valsartan 80 MG tablet  Commonly known as: DIOVAN  Take 1 tablet (80 mg total) by mouth once  daily.            STOP taking these medications      amoxicillin 500 MG capsule  Commonly known as: AMOXIL     chlorhexidine 0.12 % solution  Commonly known as: PERIDEX     HYDROcodone-acetaminophen 7.5-325 mg per tablet  Commonly known as: NORCO     naproxen sodium 220 MG tablet  Commonly known as: ANAPROX              Eriberto Da Silva MD  Orthopedics  [facility])

## 2024-01-09 NOTE — PLAN OF CARE
Problem: Occupational Therapy  Goal: Occupational Therapy Goal  Description: Goals to be met by: 1/9/24     Patient will increase functional independence with ADLs by performing:    UE Dressing with Modified [place].  LE Dressing with Modified [place] and Assistive Devices as needed.  Grooming while standing at sink with Modified [place].  Toileting from bedside commode with Modified [place] for hygiene and clothing management.   Bathing from  shower chair/bench with Modified [place].  Toilet transfer to bedside commode with Modified [place].    Outcome: Ongoing, Progressing

## 2024-01-09 NOTE — PT/OT/SLP EVAL
"Occupational Therapy Evaluation and Treatment    Name: Aurora Castle  MRN: [id number]  Admitting Diagnosis: Primary osteoarthritis of left knee Day of Surgery  Recent Surgery: Procedure(s) (LRB):  ARTHROPLASTY, KNEE, TOTAL, USING COMPUTER-ASSISTED NAVIGATION: GAGANDEEP: LEFT: NICHOLAS - ZEUSN (Left) Day of Surgery    Recommendations:     Discharge Recommendations: Low Intensity Therapy  Level of Assistance Recommended: 24 hours supervision  Discharge Equipment Recommendations: none  Barriers to discharge: None    Assessment:     Aurora Castle is a 78 y.o. female with a medical diagnosis of Primary osteoarthritis of left knee. She presents with performance deficits affecting function including impaired self care skills, impaired functional mobility, orthopedic precautions. Pt was able to perform supine/sit, sit/stand, and stand pivot to BSC over toilet and stand pivot to chair c CGA and RW.  Pt had c/o numbness in the bottom of her R foot but was able to bear weight through B LE.  Pt c c/o being cold, nauseous, and had one small LOB in bathroom.  Pt was able to sit in chair in bathroom and then rolled back into room.     Rehab Prognosis: Good; patient would benefit from acute OT services to address these deficits and reach maximum level of function.    Plan:     Patient to be seen daily to address the above listed problems via self-care/home management, therapeutic activities, therapeutic exercises  Plan of Care Expires: 01/09/24  Plan of Care Reviewed with: patient    Subjective     Chief Complaint: L TKA  Patient Comments/Goals: "I feel really cold".  Pain/Comfort:  Pain Rating 1: 1/10    Patients cultural, spiritual, Anabaptism conflicts given the current situation: no    Social History:  Living Environment: Patient lives with their spouse in a 2 story home with number of outside stair(s): 3 c B rails, number of inside stair(s): 20 c B rails, and tub-shower combo  Prior Level of Function: Prior to admission, " patient was independent.  Roles and Routines: Patient was driving and working prior to admission.  Equipment Used at Home: walker, rolling, bedside commode, raised toilet, shower chair  DME owned (not currently used): rolling walker, bedside commode, and shower chair  Assistance Upon Discharge: significant other    Objective:     Communicated with RN prior to session. Patient found supine with cryotherapy, perineural catheter, peripheral IV upon OT entry to room.    General Precautions: Standard, fall   Orthopedic Precautions: LLE weight bearing as tolerated   Braces: N/A    Respiratory Status: Room air    Occupational Performance    Gait belt applied - Yes    Bed Mobility:   Supine to sit from right side of bed with supervision    Functional Mobility/Transfers:  Sit <> Stand Transfer with contact guard assistance with rolling walker  Bed <> Chair Transfer using Stand Pivot technique with contact guard assistance with rolling walker  Toilet Transfer Stand Pivot technique with contact guard assistance with rolling walker and bedside commode  Functional Mobility: Pt was able to walk to bathroom c CGA but was unable to walk back d/t lightheadedness, being cold, and had LOB x1.    Activities of Daily Living:  Upper Body Dressing: minimum assistance to don hospital gown.      Physical Exam:  Upper Extremity Range of Motion:     -       Right Upper Extremity: WFL  -       Left Upper Extremity: WFL  Upper Extremity Strength:    -       Right Upper Extremity: WFL  -       Left Upper Extremity: WFL    AMPAC 6 Click ADL:  AMPAC Total Score: 16    Treatment & Education:  Educated on the importance of mobility to maximize recovery  Educated on the importance of OOB mobility within safe range in order to decrease adverse effects of prolonged bedrest  Educated on safety with functional mobility; hand placement to ensure safe transfers to various surfaces in prep for ADLs  Educated on performing functional mobility and ADLs in  adherence to orthopedic precautions  Educated on weight bearing status  Will continue to educate as needed      Patient clear to ambulate to/from bathroom with RN/PCT, assist x1 .    Patient left up in chair with all lines intact, call button in reach, and RN notified.    GOALS:   Multidisciplinary Problems       Occupational Therapy Goals          Problem: Occupational Therapy    Goal Priority Disciplines Outcome Interventions   Occupational Therapy Goal     OT, PT/OT Ongoing, Progressing    Description: Goals to be met by: 24     Patient will increase functional independence with ADLs by performing:    UE Dressing with Modified [place].  LE Dressing with Modified [place] and Assistive Devices as needed.  Grooming while standing at sink with Modified [place].  Toileting from bedside commode with Modified [place] for hygiene and clothing management.   Bathing from  shower chair/bench with Modified [place].  Toilet transfer to bedside commode with Modified [place].                         History:     Past Medical History:   Diagnosis Date    Arthritis     History of adenomatous polyp of colon     Hypertension     Joint pain     Pneumonia     during [facility]     Primary osteoarthritis of right knee 2016    Tendonitis of ankle or foot     Trouble in sleeping     Urinary incontinence          Past Surgical History:   Procedure Laterality Date     SECTION          COLONOSCOPY N/A 2018    Procedure: COLONOSCOPY;  Surgeon: Jim Borges MD;  Location: [address];  Service: Endoscopy;  Laterality: N/A;    hemorrhiodectomy      tendon surgery L foot      TOTAL KNEE ARTHROPLASTY Right 2020    Procedure: ARTHROPLASTY, KNEE, TOTAL-DEPUY-SIGMA/DEPUY-TC3/MBT back up;  Surgeon: Zack Patton III, MD;  Location: [facility];  Service: Orthopedics;  Laterality: Right;       Time Tracking:     OT Date of Treatment: 24  OT Start Time:   OT Stop Time:  1655  OT Total Time (min): 31 min    Billable Minutes: Evaluation 8, Self Care/Home Management 12, and Therapeutic Activity 11    1/8/2024

## 2024-01-09 NOTE — PLAN OF CARE
01/08/24 1814   QUINN Message   Medicare Outpatient and Observation Notification regarding financial responsibility Given to patient/caregiver   Date QUINN was signed 01/08/24   Time QUINN was signed 1814     Quinn form provided by [place] staff and signed on the above date and time.    Rosibel Singer RNCM  Case Management  [facility]  [phone]

## 2024-01-09 NOTE — PT/OT/SLP PROGRESS
"Occupational Therapy   Treatment and Discharge Note    Name: Aurora Castle  MRN: [id number]  Admitting Diagnosis:  Primary osteoarthritis of left knee  1 Day Post-Op    Recommendations:     Discharge Recommendations: Low Intensity Therapy  Discharge Equipment Recommendations:  none  Barriers to discharge:  None    Assessment:     Aurora Castle is a 78 y.o. female with a medical diagnosis of Primary osteoarthritis of left knee.  She presents with L TKA. Performance deficits affecting function are impaired self care skills, impaired functional mobility, orthopedic precautions. Pt was able to perform supine/sit T/F c S and Sit <> Stand Transfer with supervision with rolling walker Bed <> Chair Transfer using Stand Pivot technique with supervision with rolling walker Toilet Transfer Stand Pivot technique with supervision with rolling walker and bedside commode.  Able to perform UB/LB dressing c modified independence  Educated pt on bathing, car transfers, and cryotherapy.       Rehab Prognosis:  Good; patient would benefit from acute skilled OT services to address these deficits and reach maximum level of function.       Plan:     Patient to be seen daily to address the above listed problems via self-care/home management, therapeutic activities, therapeutic exercises  Plan of Care Expires: 01/09/24  Plan of Care Reviewed with: patient    Subjective     Chief Complaint: L TKA  Patient/Family Comments/goals: "I'm ready".  Pain/Comfort:  Pain Rating 1: 3/10    Objective:     Communicated with: RN prior to session.  Patient found supine with  (No lines) upon OT entry to room.    General Precautions: Standard, fall    Orthopedic Precautions:LLE weight bearing as tolerated  Braces: N/A  Respiratory Status: Room air     Occupational Performance:     Bed Mobility:    Patient completed Supine to Sit with supervision     Functional Mobility/Transfers:  Patient completed Sit <> Stand Transfer with supervision  with  rolling " walker   Patient completed Bed <> Chair Transfer using Stand Pivot technique with supervision with rolling walker  Patient completed Toilet Transfer Stand Pivot technique with supervision with  rolling walker and bedside commode  Functional Mobility: Pt was able to walk to bathroom c CGA and RW.    Activities of Daily Living:  Grooming: modified independence to wash hands, brush teeth, and apply make up while standing at sink.  Upper Body Dressing: modified independence to don shirt.  Lower Body Dressing: modified independence to don underwear, pants, socks, and shoes.  Toileting: modified independence for toilet hygiene.      [facility] 6 Click ADL: 24    Patient left up in chair with all lines intact, call button in reach, and RN notified    GOALS:   Multidisciplinary Problems       Occupational Therapy Goals          Problem: Occupational Therapy    Goal Priority Disciplines Outcome Interventions   Occupational Therapy Goal     OT, PT/OT Ongoing, Progressing    Description: Goals to be met by: 1/9/24     Patient will increase functional independence with ADLs by performing:    UE Dressing with Modified [place].  LE Dressing with Modified [place] and Assistive Devices as needed.  Grooming while standing at sink with Modified [place].  Toileting from bedside commode with Modified [place] for hygiene and clothing management.   Bathing from  shower chair/bench with Modified [place].  Toilet transfer to bedside commode with Modified [place].                         Time Tracking:     OT Date of Treatment: 01/09/24  OT Start Time: 0755  OT Stop Time: 0825  OT Total Time (min): 30 min    Billable Minutes:Self Care/Home Management 15  Therapeutic Activity 15               1/9/2024

## 2024-01-09 NOTE — PLAN OF CARE
[facility] [place] [address] [address] [facility] [id number]) Patient to begin Outpatient PT on 1/10/24 @ 1PM.      Rosibel Singer RNCM  Case Management  [facility]  [phone]

## 2024-01-09 NOTE — PROGRESS NOTES
[facility])  Orthopedics  Progress Note    Patient Name: Aurora Castle  MRN: [id number]  Admission Date: 1/8/2024  Hospital Length of Stay: 0 days  Attending Provider: Zack Patton III, MD  Primary Care Provider: Isabelle Vincent MD  Follow-up For: Procedure(s) (LRB):  ARTHROPLASTY, KNEE, TOTAL, USING COMPUTER-ASSISTED NAVIGATION: GAGANDEEP: LEFT: NICHOLAS - TRIATHALON (Left)    Post-Operative Day: 1 Day Post-Op  Subjective:     Principal Problem:Primary osteoarthritis of left knee    Principal Orthopedic Problem: Same    Interval History: Pt seen and examined at bedside. NAEON. Pain controlled. PT today.  VSS, AF. No other concerns stated. Urinating.     Review of patient's allergies indicates:   Allergen Reactions    Erythromycin Itching and Swelling     Eye swelling   Local eye ointment       Current Facility-Administered Medications   Medication    0.9%  NaCl infusion    acetaminophen tablet 1,000 mg    ceFAZolin 2 g in dextrose 5 % in water (D5W) 50 mL IVPB (MB+)    ceFAZolin 2 g in sodium chloride 0.9 % 50 mL IVPB (MB+)    fentaNYL 50 mcg/mL injection 100 mcg    fentaNYL 50 mcg/mL injection 25 mcg    haloperidol lactate injection 0.5 mg    HYDROmorphone tablet 2 mg    HYDROmorphone tablet 4 mg    LIDOcaine (PF) 10 mg/ml (1%) injection 10 mg    melatonin tablet 6 mg    methocarbamoL tablet 1,000 mg    midazolam (VERSED) 1 mg/mL injection 4 mg    morphine injection 2 mg    naloxone 0.4 mg/mL injection 0.02 mg    ondansetron injection 4 mg    oxyCODONE immediate release tablet 5 mg    pregabalin capsule 75 mg    prochlorperazine injection Soln 5 mg    sodium chloride 0.9% flush 10 mL    sodium chloride 0.9% flush 3 mL    tranexamic acid (CYKLOKAPRON) 1,000 mg in sodium chloride 0.9 % 100 mL IVPB (MB+)    tranexamic acid (CYKLOKAPRON) 1,000 mg in sodium chloride 0.9 % 100 mL IVPB (MB+)    tranexamic acid (CYKLOKAPRON) 3,000 mg in sodium chloride 0.9% SolP 100 mL    [START ON 1/9/2024] valsartan  "tablet 80 mg     Objective:     Vital Signs (Most Recent):  Temp: 97.7 °F (36.5 °C) (01/08/24 1523)  Pulse: (!) 53 (01/08/24 1545)  Resp: 18 (01/08/24 1545)  BP: (!) 124/59 (01/08/24 1545)  SpO2: 98 % (01/08/24 1545) Vital Signs (24h Range):  Temp:  [97.7 °F (36.5 °C)-98.1 °F (36.7 °C)] 97.7 °F (36.5 °C)  Pulse:  [53-77] 53  Resp:  [16-20] 18  SpO2:  [95 %-98 %] 98 %  BP: (106-135)/(57-74) 124/59     Weight: 76.2 kg (168 lb)  Height: 5' 10" (177.8 cm)  Body mass index is 24.11 kg/m².      Intake/Output Summary (Last 24 hours) at 1/8/2024 1610  Last data filed at 1/8/2024 1344  Gross per 24 hour   Intake 1800 ml   Output --   Net 1800 ml        Ortho/SPM Exam    Vitals: Afebrile.  Vital signs stable.  General: No acute distress.  Cardio: Regular rate.  Chest: No increased work of breathing.    Left Lower Extremity Exam    - Dressing c/d/i  - Quad and hip flexor intact  - Compartments soft and compressible  - ROM full (eversion,inversion,plantar/dorsiflexion)  - TA/EHL/Gastroc/FHL assessed in isolation without deficit  - SILT throughout  - DP and PT palpated  2+  - Capillary Refill <3s        Significant Labs:   Recent Lab Results       None          All pertinent labs within the past 24 hours have been reviewed.    Significant Imaging: I have reviewed all pertinent imaging results/findings.  Assessment/Plan:     * Primary osteoarthritis of left knee  Aurora Castle is a 78 y.o. female s/p L TKA      - Weight bearing status: WBAT  - Pain control: Per APS  - Antibiotics: Ancef  - DVT Prophylaxis: ASA , SCD's at all times when not ambulating.  - PT/OT      - Dispo: PT/OT, home           Eriberto Da Silva MD  Orthopedics  [place] [facility] ([facility])    "

## 2024-01-09 NOTE — PLAN OF CARE
Problem: Adult Inpatient Plan of Care  Goal: Plan of Care Review  Outcome: Adequate for Care Transition  Flowsheets (Taken 1/9/2024 1055)  Plan of Care Reviewed With: patient     Problem: Adult Inpatient Plan of Care  Goal: Patient-Specific Goal (Individualized)  Outcome: Adequate for Care Transition  Flowsheets (Taken 1/9/2024 1055)  Individualized Care Needs: work with PT and OT, manage pain.     Problem: Adult Inpatient Plan of Care  Goal: Optimal Comfort and Wellbeing  Intervention: Monitor Pain and Promote Comfort  Flowsheets (Taken 1/9/2024 1145)  Pain Management Interventions:   breathing exercises utilized   care clustered   medication offered   pain management plan reviewed with patient/caregiver   pillow support provided   position adjusted   prescribed exercises encouraged   quiet environment facilitated   relaxation techniques promoted   warm blanket provided     Problem: Adjustment to Surgery (Knee Arthroplasty)  Goal: Optimal Coping  Intervention: Support Psychosocial Response to Surgery and Mobility Changes  Flowsheets (Taken 1/9/2024 1145)  Supportive Measures:   active listening utilized   positive reinforcement provided   verbalization of feelings encouraged     Problem: Bleeding (Knee Arthroplasty)  Goal: Absence of Bleeding  Intervention: Monitor and Manage Bleeding  Flowsheets (Taken 1/9/2024 1145)  Bleeding Management: dressing monitored     Problem: Bowel Motility Impaired (Knee Arthroplasty)  Goal: Effective Bowel Elimination  Intervention: Enhance Bowel Motility and Elimination  Flowsheets (Taken 1/9/2024 1145)  Bowel Elimination Promotion: adequate fluid intake promoted     Problem: Respiratory Compromise (Knee Arthroplasty)  Goal: Effective Oxygenation and Ventilation  Intervention: Optimize Oxygenation and Ventilation  Flowsheets (Taken 1/9/2024 1145)  Airway/Ventilation Management: airway patency maintained     Problem: Fall Injury Risk  Goal: Absence of Fall and Fall-Related  Injury  Intervention: Identify and Manage Contributors  Flowsheets (Taken 1/9/2024 1145)  Self-Care Promotion:   independence encouraged   safe use of adaptive equipment encouraged  Medication Review/Management: medications reviewed     Problem: Hypertension Comorbidity  Goal: Blood Pressure in Desired Range  Intervention: Maintain Blood Pressure Management  Flowsheets (Taken 1/9/2024 1145)  Medication Review/Management: medications reviewed   Plan of care reviewed with patient; verbalized understanding.   Medications reviewed and administered as ordered.  Rounding for safety and patient care per policy.   Safety precautions maintained. Patient working appropriately with PT/OT.  DME at bedside.  Call light within reach, bed wheels locked, bed in lowest position, side rails ^x2, safety maintained. NADN, Adequate for discharge.

## 2024-01-09 NOTE — PT/OT/SLP EVAL
"Physical Therapy Evaluation and Discharge Note    Patient Name:  Aurora Castle   MRN:  [id number]  Admitting Diagnosis:  Primary osteoarthritis of left knee   Recent Surgery: Procedure(s) (LRB):  ARTHROPLASTY, KNEE, TOTAL, USING COMPUTER-ASSISTED NAVIGATION: GAGANDEEP: LEFT: NICHOLAS - EDDIE (Left) 1 Day Post-Op  Admit Date: 1/8/2024  Length of Stay: 0 days    Recommendations:     Discharge Recommendations:  Low Intensity Therapy   Discharge Equipment Recommendations: none   Justification for Equipment: N/A  Barriers to discharge: None    Assessment:     Aurora Castle is a 78 y.o. female admitted with a medical diagnosis of Primary osteoarthritis of left knee. .  At this time, patient is appropriate for discharge home and does not require further acute PT services.     Highest level of mobility achieved this visit: 8 stairs with ARIANA HR and CGA    Treatment Tolerated: Well    Plan:     During this hospitalization, patient does not require further acute PT services.  Please re-consult if situation changes.      Subjective     TANNA Valdez notified prior to session. No family present upon PT entrance into room.    Chief Complaint: pain  Patient/Family Comments/goals: "Once I get up the stairs I don't really have to leave"  Pain/Comfort:  Pain Rating 1: 3/10  Location - Side 1: Left  Location - Orientation 1: generalized  Location 1: knee  Pain Addressed 1: Pre-medicate for activity, Reposition, Distraction    Social History:  Residence: lives with their spouse 2-story house with 3 SUELLEN and 20 steps to 2nd story with ARIANA HR.  Support available: Spouse  Equipment Used: walker, rolling, bedside commode, raised toilet, shower chair  Equipment Owned (not using): None  Prior level of function: independent  Work: Employed -works from home   Drive: yes.     Objective:     Additional staff present: TANNA Valdez in to give meds    Patient found up in chair with  (no active lines) upon PT entry to room.    General Precautions: " "Standard, fall   Orthopedic Precautions:LLE weight bearing as tolerated   Braces: N/A   Body mass index is 24.11 kg/m².  Oxygen Device: Room Air  Vitals: BP (!) 146/68 (BP Location: Right arm, Patient Position: Sitting)   Pulse (!) 58   Temp 97.8 °F (36.6 °C)   Resp 16   Ht 5' 10" (1.778 m)   Wt 76.2 kg (168 lb)   LMP  (LMP Unknown)   SpO2 98%   Breastfeeding No   BMI 24.11 kg/m²     Exam:  Cognition:   Alert and Cooperative  Command following: Follows two-step verbal commands  Fluency: clear/fluent  Skin Integrity: Visible skin intact  Edema: None noted  Sensation: Intact to light touch  Hearing: Intact  Vision:  Intact  Coordination: grossly WFL  Range of Motion:  RLE: WFL  LLE: WFL except slight decrease in knee extension  Strength Exam:  Bilateral Lower Extremity Strength: grossly WFL    Outcome Measures:  AM-PAC 6 CLICK MOBILITY  Turning over in bed (including adjusting bedclothes, sheets and blankets)?: 4  Sitting down on and standing up from a chair with arms (e.g., wheelchair, bedside commode, etc.): 3  Moving from lying on back to sitting on the side of the bed?: 4  Moving to and from a bed to a chair (including a wheelchair)?: 3  Need to walk in hospital room?: 3  Climbing 3-5 steps with a railing?: 3  Basic Mobility Total Score: 20     Functional Mobility:    Bed Mobility:   Pt found/returned to bedside chair    Transfers:   Sit <> Stand Transfer: stand by assistance with rolling walker   Stand <> Sit Transfer: stand by assistance with rolling walker   [id number] from bedside chair    Standing Balance:  Assistance Level Required: Stand-by Assistance  Patient used: rolling walker   Time: 10 min  + 5 min  Postural deviations noted: no deviations noted    Gait:   Patient ambulated: 110ft   Patient required: contact guard  Patient used:  rolling walker   Gait Pattern observed: swing-through gait  Gait Deviation(s): occasional unsteady gait, decreased step length, antalgic gait, decreased chapo, " "increased time in stance phase on unaffected leg, and L knee held in slight flexion during gait  Impairments due to: pain, decreased endurance, and impaired ROM  Chair follow for patient safety  Gait belt utilized    Stairs:  Pt ascended/descended 6" curb step with Rolling Walker with no handrails with Contact Guard Assistance.   Pt ascended/descended  8 stair(s) with No Assistive Device with bilateral handrails with Contact Guard Assistance.   Step to step    Therapeutic Exercises:   Supine knee exercies per protocol with handout provided and demonstration and facilitation for technique  Quad sets   10 reps x 3 second holds  Glute sets  10 reps x 3 sec holds  Ankle Pumps  10 reps x 3 sec holds  Hip abduction  10 reps  SAQs  10 reps  LAQs  10 reps  SLRs  10 reps  Heel slides  10 reps    Education:  Time provided for education, counseling and discussion of health disposition in regards to patient's current status  All questions answered within PT scope of practice and to patient's satisfaction  PT role in POC to address current functional deficits  Pt educated on proper body mechanics, safety techniques, and energy conservation with PT facilitation and cueing throughout session    Patient left up in chair with call button in reach.    History:     Past Medical History:   Diagnosis Date    Arthritis     History of adenomatous polyp of colon     Hypertension     Joint pain     Pneumonia     during college     Primary osteoarthritis of right knee 2016    Tendonitis of ankle or foot     Trouble in sleeping     Urinary incontinence        Past Surgical History:   Procedure Laterality Date     SECTION          COLONOSCOPY N/A 2018    Procedure: COLONOSCOPY;  Surgeon: Jim Borges MD;  Location: [address];  Service: Endoscopy;  Laterality: N/A;    hemorrhiodectomy      tendon surgery L foot      TOTAL KNEE ARTHROPLASTY Right 2020    Procedure: ARTHROPLASTY, KNEE, " TOTAL-DEPUY-SIGMA/DEPUY-TC3/MBT back up;  Surgeon: Zack Patton III, MD;  Location: [facility];  Service: Orthopedics;  Laterality: Right;       Time Tracking:     PT Received On: 01/09/24  PT Start Time: 0837     PT Stop Time: 0926  PT Total Time (min): 49 min     Billable Minutes: Evaluation 1 procedure, Gait Training 12 min, Therapeutic Activity 12 min, and Therapeutic Exercise 15 min    Abdi Guevara PT, DPT  1/9/2024

## 2024-01-10 ENCOUNTER — CLINICAL SUPPORT (OUTPATIENT)
Dept: ORTHOPEDICS | Facility: CLINIC | Age: 79
End: 2024-01-10
Payer: MEDICARE

## 2024-01-10 DIAGNOSIS — Z96.652 S/P TOTAL KNEE REPLACEMENT, LEFT: Primary | ICD-10-CM

## 2024-01-10 PROCEDURE — 99499 UNLISTED E&M SERVICE: CPT | Mod: HCNC,95,, | Performed by: ORTHOPAEDIC SURGERY

## 2024-01-10 NOTE — PROGRESS NOTES
I called the patient today regarding her surgery with Dr. Zack Patton III. The patient had a left TKA on 1/8/24.     Pain Scale:  pt states she is doing better today  / 10    Any issues with Fever: No.    Any issues with medications (specifically DVT prophylaxis): No.  ASA 81 BID  Celebrex : yes  Protonix : yes  Resume home meds : Yes    Any issues with bowel movements:  Passing richelle: No.                                                                 Urination: No.                                                                 Constipation: No.    Completing at home exercises: Yes:     Any concerns regarding their dressing/bandage:  No.    Patient confirmed first OP-PT appointment:  [facility] on  1/12/24 at 1300.     Any other concerns: No.    Blue Bracelet : yes    The patient was informed that if they have any urgent issues with their bandage, medications or any other health concerns regarding their surgery to call the 24/7 Orthopedic Post-op Hot Line at [phone]. The patient was reminded that if they have any chest pain or shortness of breath to call 911 or go to the ER.    The patient verbalized understanding and does not have any other questions

## 2024-01-11 ENCOUNTER — PATIENT MESSAGE (OUTPATIENT)
Dept: ORTHOPEDICS | Facility: CLINIC | Age: 79
End: 2024-01-11
Payer: MEDICARE

## 2024-01-11 ENCOUNTER — PATIENT MESSAGE (OUTPATIENT)
Dept: ADMINISTRATIVE | Facility: OTHER | Age: 79
End: 2024-01-11
Payer: MEDICARE

## 2024-01-17 ENCOUNTER — PATIENT MESSAGE (OUTPATIENT)
Dept: ORTHOPEDICS | Facility: CLINIC | Age: 79
End: 2024-01-17
Payer: MEDICARE

## 2024-01-18 DIAGNOSIS — Z96.652 S/P TOTAL KNEE REPLACEMENT, LEFT: ICD-10-CM

## 2024-01-18 RX ORDER — METHOCARBAMOL 750 MG/1
750 TABLET, FILM COATED ORAL 4 TIMES DAILY PRN
Qty: 40 TABLET | Refills: 0 | Status: SHIPPED | OUTPATIENT
Start: 2024-01-18 | End: 2024-01-28

## 2024-01-26 ENCOUNTER — OFFICE VISIT (OUTPATIENT)
Dept: ORTHOPEDICS | Facility: CLINIC | Age: 79
End: 2024-01-26
Payer: MEDICARE

## 2024-01-26 DIAGNOSIS — Z96.652 S/P TOTAL KNEE REPLACEMENT, LEFT: Primary | ICD-10-CM

## 2024-01-26 PROCEDURE — 99999 PR PBB SHADOW E&M-EST. PATIENT-LVL III: CPT | Mod: PBBFAC,HCNC,, | Performed by: NURSE PRACTITIONER

## 2024-01-26 PROCEDURE — 1159F MED LIST DOCD IN RCRD: CPT | Mod: HCNC,CPTII,S$GLB, | Performed by: NURSE PRACTITIONER

## 2024-01-26 PROCEDURE — 99024 POSTOP FOLLOW-UP VISIT: CPT | Mod: HCNC,S$GLB,, | Performed by: NURSE PRACTITIONER

## 2024-01-26 PROCEDURE — 1160F RVW MEDS BY RX/DR IN RCRD: CPT | Mod: HCNC,CPTII,S$GLB, | Performed by: NURSE PRACTITIONER

## 2024-01-26 NOTE — PROGRESS NOTES
Aurora MCGILL Tin presents for initial post-operative visit following a left total knee arthroplasty performed by Dr. Patton on 1/8/2024    Exam:     Ambulating well with cane  Incision is clean and dry without drainage or erythema.   ROM:0-95    Initial post-operative radiographs reviewed today revealing a well fixed and aligned prosthesis.    A/P:  2 weeks s/p left total knee replacement    - The patient was advised to keep the incision clean and dry for the next 24 hours after which she may wash the area with antibacterial soap in the shower. Will not submerge incision until one month post op.  - Outpatient PT: ongoing  - Continue aspirin for 1 month post op.  - Pain medication refill not needed  - Follow up in 4 weeks with surgeon. Pt will call clinic with problems/concerns.

## 2024-01-30 ENCOUNTER — PATIENT MESSAGE (OUTPATIENT)
Dept: ORTHOPEDICS | Facility: CLINIC | Age: 79
End: 2024-01-30
Payer: MEDICARE

## 2024-01-30 DIAGNOSIS — Z96.652 STATUS POST LEFT KNEE REPLACEMENT: Primary | ICD-10-CM

## 2024-01-30 RX ORDER — METHOCARBAMOL 750 MG/1
750 TABLET, FILM COATED ORAL 4 TIMES DAILY
Qty: 40 TABLET | Refills: 0 | Status: SHIPPED | OUTPATIENT
Start: 2024-01-30 | End: 2024-02-09

## 2024-02-04 ENCOUNTER — PATIENT MESSAGE (OUTPATIENT)
Dept: ORTHOPEDICS | Facility: CLINIC | Age: 79
End: 2024-02-04
Payer: MEDICARE

## 2024-02-05 DIAGNOSIS — Z96.652 S/P TOTAL KNEE REPLACEMENT, LEFT: ICD-10-CM

## 2024-02-05 RX ORDER — PREGABALIN 75 MG/1
75 CAPSULE ORAL 2 TIMES DAILY
Qty: 60 CAPSULE | Refills: 0 | Status: SHIPPED | OUTPATIENT
Start: 2024-02-05 | End: 2024-04-08 | Stop reason: ALTCHOICE

## 2024-02-05 RX ORDER — HYDROMORPHONE HYDROCHLORIDE 2 MG/1
TABLET ORAL
Qty: 30 TABLET | Refills: 0 | Status: SHIPPED | OUTPATIENT
Start: 2024-02-05 | End: 2024-02-22

## 2024-02-05 RX ORDER — CELECOXIB 200 MG/1
200 CAPSULE ORAL DAILY
Qty: 30 CAPSULE | Refills: 0 | Status: SHIPPED | OUTPATIENT
Start: 2024-02-05 | End: 2024-04-02 | Stop reason: SDUPTHER

## 2024-02-05 RX ORDER — PANTOPRAZOLE SODIUM 40 MG/1
40 TABLET, DELAYED RELEASE ORAL DAILY
Qty: 30 TABLET | Refills: 0 | Status: SHIPPED | OUTPATIENT
Start: 2024-02-05 | End: 2024-04-08 | Stop reason: ALTCHOICE

## 2024-02-09 ENCOUNTER — PATIENT MESSAGE (OUTPATIENT)
Dept: ORTHOPEDICS | Facility: CLINIC | Age: 79
End: 2024-02-09
Payer: MEDICARE

## 2024-02-12 ENCOUNTER — PATIENT MESSAGE (OUTPATIENT)
Dept: ORTHOPEDICS | Facility: CLINIC | Age: 79
End: 2024-02-12
Payer: MEDICARE

## 2024-02-12 DIAGNOSIS — Z96.652 S/P TOTAL KNEE REPLACEMENT, LEFT: ICD-10-CM

## 2024-02-12 RX ORDER — DEXTROMETHORPHAN HYDROBROMIDE, GUAIFENESIN 5; 100 MG/5ML; MG/5ML
650 LIQUID ORAL EVERY 8 HOURS
Qty: 120 TABLET | Refills: 0 | Status: SHIPPED | OUTPATIENT
Start: 2024-02-12 | End: 2024-05-09

## 2024-02-12 RX ORDER — METHOCARBAMOL 750 MG/1
750 TABLET, FILM COATED ORAL 4 TIMES DAILY PRN
Qty: 40 TABLET | Refills: 0 | Status: SHIPPED | OUTPATIENT
Start: 2024-02-12 | End: 2024-02-22

## 2024-02-22 ENCOUNTER — HOSPITAL ENCOUNTER (OUTPATIENT)
Dept: RADIOLOGY | Facility: HOSPITAL | Age: 79
Discharge: HOME | End: 2024-02-22
Attending: ORTHOPAEDIC SURGERY
Payer: MEDICARE

## 2024-02-22 ENCOUNTER — OFFICE VISIT (OUTPATIENT)
Dept: ORTHOPEDICS | Facility: CLINIC | Age: 79
End: 2024-02-22
Payer: MEDICARE

## 2024-02-22 VITALS — WEIGHT: 185.19 LBS | BODY MASS INDEX: 27.43 KG/M2 | HEIGHT: 69 IN

## 2024-02-22 DIAGNOSIS — Z96.652 STATUS POST LEFT KNEE REPLACEMENT: ICD-10-CM

## 2024-02-22 DIAGNOSIS — Z96.652 STATUS POST LEFT KNEE REPLACEMENT: Primary | ICD-10-CM

## 2024-02-22 PROCEDURE — 3288F FALL RISK ASSESSMENT DOCD: CPT | Mod: HCNC,CPTII,S$GLB, | Performed by: ORTHOPAEDIC SURGERY

## 2024-02-22 PROCEDURE — 99999 PR PBB SHADOW E&M-EST. PATIENT-LVL III: CPT | Mod: PBBFAC,HCNC,, | Performed by: ORTHOPAEDIC SURGERY

## 2024-02-22 PROCEDURE — 73562 X-RAY EXAM OF KNEE 3: CPT | Mod: TC,HCNC,LT

## 2024-02-22 PROCEDURE — 1101F PT FALLS ASSESS-DOCD LE1/YR: CPT | Mod: HCNC,CPTII,S$GLB, | Performed by: ORTHOPAEDIC SURGERY

## 2024-02-22 PROCEDURE — 99024 POSTOP FOLLOW-UP VISIT: CPT | Mod: HCNC,S$GLB,, | Performed by: ORTHOPAEDIC SURGERY

## 2024-02-22 PROCEDURE — 1159F MED LIST DOCD IN RCRD: CPT | Mod: HCNC,CPTII,S$GLB, | Performed by: ORTHOPAEDIC SURGERY

## 2024-02-22 PROCEDURE — 73562 X-RAY EXAM OF KNEE 3: CPT | Mod: 26,HCNC,LT, | Performed by: RADIOLOGY

## 2024-02-22 PROCEDURE — 1125F AMNT PAIN NOTED PAIN PRSNT: CPT | Mod: HCNC,CPTII,S$GLB, | Performed by: ORTHOPAEDIC SURGERY

## 2024-02-22 RX ORDER — TRAMADOL HYDROCHLORIDE 50 MG/1
50 TABLET ORAL EVERY 12 HOURS PRN
Qty: 30 TABLET | Refills: 0 | Status: SHIPPED | OUTPATIENT
Start: 2024-02-22 | End: 2024-05-09

## 2024-02-22 NOTE — PROGRESS NOTES
Subjective:     HPI:   Aurora Castle is a 78 y.o. female who presents 6 weeks out from left TKA    Date of surgery: R TKA 6/8/20 sigma  L TKA 1/8/24 GAGANDEEP all poly     History of Present Illness  The patient presents for follow-up of left knee.    Her left knee was operated on 01/18/2024. Her left knee was a robotic knee compared to the non-robotic right knee that we did. She is doing a little better all the time. She finds 2 to 3 hours is her limit for doing things outside the house. Tried to go to [facility] but pain after several hours. She can do long walks. She dropped the hydromorphone for PT because she found 1 day she forgot it and it was the same, but she is taking it at bedtime.     She is still taking Robaxin, Tylenol, Celebrex, and Lyrica. When she tries to cut back, she is in a lot of pain. She stopped the cane 2 days ago. She is still going to [facility] 3 times a week. She has been changing ice packs. She got without using the ice machine this time. She has been getting 115 degrees of flexion with therapy. She has been doing the exercise sheet that was given to her. She is still having a lot of swelling. Her feet hurt from the swelling. She wears compression socks sometimes. She sometimes has pins and needles all over, not just in the leg. Had significant neuropathic pain post op R TKA    Medications:   Dilaudid at night  Robaxin  Tylenol  Celebrex  Lyrica BID    Assistive Devices: stopped 2 days ago    PT: ongoing 3x/week at [facility]         Objective:   Body mass index is 27.09 kg/m².  Exam:    Pre-op ROM , 15 degree valgus   PT pt reports 115 (2/7/24 5-110)    6 weeks R TKA 10-95    Physical Exam  She actually gets up easily. She is walking very well. No limp, nonantalgic gait. Leg is much straighter than preop. Incision is well healed. 7 to 115 degree knee range of motion, 6 degrees valgus alignment. No extensor lag. Knee stable to anterior, posterior, varus and valgus  stresses.      Imaging:  Indication:  Exam status post left total knee arthroplasty  Exam Ordered: Radiographs of the left knee include a standing anteroposterior view, a lateral view in full flexion, and a sunrise view  Details of Examination: Todays exam show a well fixed, well positioned total knee arthroplasty with no evidence of wear, osteolysis, or loosening.  Impression:  Status post left total knee arthroplasty, implant in good position with no abnormality    Results  Imaging  Left knee x-rays were reviewed with the patient.      Assessment:       ICD-10-CM ICD-9-CM   1. Status post left knee replacement  Z96.652 V43.65      Making progress     Plan:       Patient is doing very well with their total knee arthroplasty.  They will continue with their routine care of the knee replacement and see me back for their follow-up at the routine interval.  If there are problems in the interim they will see me back sooner.    Assessment & Plan  1. Status post left knee surgery.  She is 6 weeks out from the left knee. I think we are ahead of the curve from where we were with the other knee. I think she has much less sensitivity and nerve related pain. Her motion is better. She will start tapering down on the hydromorphone. She can go to physical therapy twice a week for another couple more weeks and then try to be done. I will give her a prescription for a compound cream to rub on the knee. I will give her tramadol every 12 hours #30 to transition off diluadid    6 week follow up    This note was generated with the assistance of ambient listening technology. Verbal consent was obtained by the patient and accompanying visitor(s) for the recording of patient appointment to facilitate this note. I attest to having reviewed and edited the generated note for accuracy, though some syntax or spelling errors may persist. Please contact the author of this note for any clarification.      Orders Placed This Encounter   Procedures     Ambulatory referral/consult to Physical/Occupational Therapy     Standing Status:   Future     Standing Expiration Date:   3/22/2025     Referral Priority:   Routine     Referral Type:   Physical Medicine     Referral Reason:   Specialty Services Required     Requested Specialty:   Physical Therapy     Number of Visits Requested:   1       Medications Ordered This Encounter   Medications    traMADoL (ULTRAM) 50 mg tablet     Sig: Take 1 tablet (50 mg total) by mouth every 12 (twelve) hours as needed for Pain.     Dispense:  30 tablet     Refill:  0     Quantity prescribed more than 7 day supply? Yes, quantity medically necessary     Order Specific Question:   I have reviewed the Prescription Drug Monitoring Program (PDMP) database for this patient prior to prescribing the above opioid medication     Answer:   Yes        Past Medical History:   Diagnosis Date    Arthritis     History of adenomatous polyp of colon     Hypertension     Joint pain     Pneumonia     during college     Primary osteoarthritis of right knee 2016    Tendonitis of ankle or foot     Trouble in sleeping     Urinary incontinence        Past Surgical History:   Procedure Laterality Date    ARTHROPLASTY, KNEE, TOTAL, USING COMPUTER-ASSISTED NAVIGATION Left 2024    Procedure: ARTHROPLASTY, KNEE, TOTAL, USING COMPUTER-ASSISTED NAVIGATION: GAGANDEEP: LEFT: NICHOLAS - TRIATHALON;  Surgeon: Zack Patton III, MD;  Location: [facility];  Service: Orthopedics;  Laterality: Left;     SECTION          COLONOSCOPY N/A 2018    Procedure: COLONOSCOPY;  Surgeon: Jim Borges MD;  Location: [address]);  Service: Endoscopy;  Laterality: N/A;    hemorrhiodectomy      tendon surgery L foot      TOTAL KNEE ARTHROPLASTY Right 2020    Procedure: ARTHROPLASTY, KNEE, TOTAL-DEPUY-SIGMA/DEPUY-TC3/MBT back up;  Surgeon: Zack Patton III, MD;  Location: [facility] OR;  Service: Orthopedics;  Laterality: Right;       Family History    Problem Relation Age of Onset    Stroke Mother     Heart disease Mother 78        MI    Parkinsonism Father     No Known Problems Daughter     Melanoma Neg Hx     Psoriasis Neg Hx     Lupus Neg Hx     Eczema Neg Hx     Acne Neg Hx        Social History     Socioeconomic History    Marital status:      Spouse name: Salomon Mcdermott    Number of children: 1   Occupational History    Occupation: jeweler   Tobacco Use    Smoking status: Never    Smokeless tobacco: Never   Substance and Sexual Activity    Alcohol use: Yes     Alcohol/week: 1.0 - 2.0 standard drink of alcohol     Types: 1 - 2 Glasses of wine per week    Drug use: No    Sexual activity: Yes     Partners: Male   Other Topics Concern    Are you pregnant or think you may be? No    Breast-feeding No   Social History Narrative    Biking, walking with dog, but limits after knee replacement.        Bikes 60 min most days.     Social Determinants of Health     Financial Resource Strain: Low Risk  (12/20/2023)    Overall Financial Resource Strain (CARDIA)     Difficulty of Paying Living Expenses: Not hard at all   Food Insecurity: No Food Insecurity (12/20/2023)    Hunger Vital Sign     Worried About Running Out of Food in the Last Year: Never true     Ran Out of Food in the Last Year: Never true   Transportation Needs: No Transportation Needs (12/20/2023)    PRAPARE - Transportation     Lack of Transportation (Medical): No     Lack of Transportation (Non-Medical): No   Physical Activity: Sufficiently Active (12/20/2023)    Exercise Vital Sign     Days of Exercise per Week: 6 days     Minutes of Exercise per Session: 40 min   Stress: No Stress Concern Present (12/20/2023)    [employer] [place] [employer] - Occupational Stress Questionnaire     Feeling of Stress : Not at all   Social Connections: Moderately Integrated (12/20/2023)    Social Connection and Isolation Panel [NHANES]     Frequency of Communication with Friends and Family: More than  three times a week     Frequency of Social Gatherings with Friends and Family: More than three times a week     Attends Sabianist Services: Never     Active Member of Clubs or Organizations: Yes     Attends Club or Organization Meetings: More than 4 times per year     Marital Status:    Housing Stability: Low Risk  (12/20/2023)    Housing Stability Vital Sign     Unable to Pay for Housing in the Last Year: No     Number of Places Lived in the Last Year: 1     Unstable Housing in the Last Year: No

## 2024-02-29 ENCOUNTER — PATIENT MESSAGE (OUTPATIENT)
Dept: ORTHOPEDICS | Facility: CLINIC | Age: 79
End: 2024-02-29
Payer: MEDICARE

## 2024-03-11 RX ORDER — VALSARTAN 80 MG/1
80 TABLET ORAL DAILY
Qty: 90 TABLET | Refills: 2 | Status: SHIPPED | OUTPATIENT
Start: 2024-03-11

## 2024-03-11 NOTE — TELEPHONE ENCOUNTER
Refill Routing Note   Medication(s) are not appropriate for processing by [facility] for the following reason(s):        Required vitals abnormal    [facility] action(s):  Defer             Appointments  past 12m or future 3m with PCP    Date Provider   Last Visit   11/2/2023 Isabelle Vincent MD   Next Visit   Visit date not found Isabelle Vincent MD   ED visits in past 90 days: 0        Note composed:12:07 PM 03/11/2024

## 2024-03-11 NOTE — TELEPHONE ENCOUNTER
No care due was identified.  [facility] Embedded Care Due Messages. Reference number: [id number].   3/11/2024 9:26:45 AM CDT

## 2024-04-02 ENCOUNTER — OFFICE VISIT (OUTPATIENT)
Dept: ORTHOPEDICS | Facility: CLINIC | Age: 79
End: 2024-04-02
Payer: MEDICARE

## 2024-04-02 ENCOUNTER — HOSPITAL ENCOUNTER (OUTPATIENT)
Dept: RADIOLOGY | Facility: HOSPITAL | Age: 79
Discharge: HOME | End: 2024-04-02
Attending: ORTHOPAEDIC SURGERY
Payer: MEDICARE

## 2024-04-02 VITALS — BODY MASS INDEX: 25.2 KG/M2 | WEIGHT: 180 LBS | HEIGHT: 71 IN

## 2024-04-02 DIAGNOSIS — M79.662 PAIN IN LEFT LOWER LEG: ICD-10-CM

## 2024-04-02 DIAGNOSIS — Z96.652 STATUS POST LEFT KNEE REPLACEMENT: ICD-10-CM

## 2024-04-02 DIAGNOSIS — Z96.652 STATUS POST LEFT KNEE REPLACEMENT: Primary | ICD-10-CM

## 2024-04-02 DIAGNOSIS — Z96.652 S/P TOTAL KNEE REPLACEMENT, LEFT: ICD-10-CM

## 2024-04-02 PROCEDURE — 99999 PR PBB SHADOW E&M-EST. PATIENT-LVL IV: CPT | Mod: PBBFAC,HCNC,, | Performed by: ORTHOPAEDIC SURGERY

## 2024-04-02 PROCEDURE — 3288F FALL RISK ASSESSMENT DOCD: CPT | Mod: HCNC,CPTII,S$GLB, | Performed by: ORTHOPAEDIC SURGERY

## 2024-04-02 PROCEDURE — 1125F AMNT PAIN NOTED PAIN PRSNT: CPT | Mod: HCNC,CPTII,S$GLB, | Performed by: ORTHOPAEDIC SURGERY

## 2024-04-02 PROCEDURE — 99024 POSTOP FOLLOW-UP VISIT: CPT | Mod: HCNC,S$GLB,, | Performed by: ORTHOPAEDIC SURGERY

## 2024-04-02 PROCEDURE — 1101F PT FALLS ASSESS-DOCD LE1/YR: CPT | Mod: HCNC,CPTII,S$GLB, | Performed by: ORTHOPAEDIC SURGERY

## 2024-04-02 PROCEDURE — 1159F MED LIST DOCD IN RCRD: CPT | Mod: HCNC,CPTII,S$GLB, | Performed by: ORTHOPAEDIC SURGERY

## 2024-04-02 PROCEDURE — 93971 EXTREMITY STUDY: CPT | Mod: TC,HCNC,LT

## 2024-04-02 PROCEDURE — 93971 EXTREMITY STUDY: CPT | Mod: 26,HCNC,LT, | Performed by: RADIOLOGY

## 2024-04-02 RX ORDER — METHOCARBAMOL 750 MG/1
750 TABLET, FILM COATED ORAL 3 TIMES DAILY PRN
Qty: 60 TABLET | Refills: 0 | Status: SHIPPED | OUTPATIENT
Start: 2024-04-02 | End: 2024-04-23 | Stop reason: SDUPTHER

## 2024-04-02 RX ORDER — CELECOXIB 200 MG/1
200 CAPSULE ORAL DAILY
Qty: 30 CAPSULE | Refills: 0 | Status: SHIPPED | OUTPATIENT
Start: 2024-04-02 | End: 2024-04-23 | Stop reason: SDUPTHER

## 2024-04-02 RX ORDER — METHYLPREDNISOLONE 4 MG/1
TABLET ORAL
Qty: 1 EACH | Refills: 0 | Status: SHIPPED | OUTPATIENT
Start: 2024-04-02 | End: 2024-04-08 | Stop reason: ALTCHOICE

## 2024-04-02 NOTE — PROGRESS NOTES
Subjective:     HPI:   Aurora Castle is a 78 y.o. female who presents 12 weeks out from left TKA    Date of surgery:   R TKA 6/8/20 sigma  L TKA 1/8/24 GAGANDEEP all poly     History of Present Illness  The patient presents for evaluation status post left knee surgery.    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 She has ceased therapy and has been utilizing an indoor bike for 10 to 15 minutes daily, which she finds beneficial. However, she reports experiencing swelling and soreness in her muscles, reminiscent of a Charley horse, in her calf for the past three days. She describes her calf as squishy, while the other is hard as a rock. Her pain management regimen includes 4 Tylenol 650 mg tablets daily, which provides minimal relief. She has previously tried tramadol, but discontinued its use due to insomnia. She is not currently using a cane, walker, or crutches for mobility. She has discontinued the use of compression socks due to discomfort. She reports persistent swelling in her ankle and foot soreness. She has discontinued Dilaudid, muscle relaxers, Celebrex, and Lyrica.               Objective:   Body mass index is 25.1 kg/m².  Exam:    Pre-op ROM , 15 degree valgus   PT pt reports 115 (2/7/24 5-110)  6 week L TKA 7-115     6 weeks R TKA 10-95      Physical Exam  The patient is walking well, with no limp and a nonantalgic gait. She has a negative Trendelenburg and no groin pain with straight leg raise. There is no pain with passive range of motion of her hip joint. Her incision on her knee is well healed, with 5 to 110 degree knee flexion. She has increased stiffness and general soreness today, along with diffuse soreness, but no erythema. There is no big effusion around the knee and no concerning signs for infection. She has some nonpitting edema from below the knee down to her ankle. Her ankle is more swollen than the  other side. She has some tenderness around the calf and a positive David sign today. Distally, she is neurovascularly intact with good strength, sensation, and pulses.    Imaging:  None today    Results        Assessment:       ICD-10-CM ICD-9-CM   1. Status post left knee replacement  Z96.652 V43.65   2. Pain in left lower leg  M79.662 729.5   3. S/P total knee replacement, left  Z96.652 V43.65        Diffuse soft tissue irritation   LLE swelling, calf pain - r/o DVT     Plan:       Patient is doing very well with their total knee arthroplasty.  They will continue with their routine care of the knee replacement and see me back for their follow-up at the routine interval.  If there are problems in the interim they will see me back sooner. Prophylactic antibiotic protocol given and explained to patient.     Assessment & Plan  1. Postoperative status following left knee surgery.  The patient's left knee appears to be in a stable condition, albeit with some general soft tissue irritation, potentially postoperative swelling. However, considering the 3-month cachorro, it is anticipated that her condition will improve and not deteriorate. An ultrasound will be conducted to exclude the possibility of a deep vein thrombosis. Prescriptions for Celebrex and Tylenol will be reissued, along with a Medrol Dosepak to alleviate soft tissue swelling and inflammation. The patient is advised to continue the use of Tylenol, Celebrex, and a muscle relaxer. Narcotics and Lyrica are not deemed necessary at this juncture. The patient is advised to wear compression socks, either below the knee or above, to alleviate swelling. She is also advised to refrain from biking to allow her knee to rest. Gentle range of motion and hamstring stretches are recommended to prevent stiffness, but not to irritate and overexert it. Should the ultrasound yield a positive result, the initiation of anticoagulant therapy will be considered.    Follow-up  The  patient is scheduled for a follow-up visit in a few weeks.     Rx robaxin   Rx medrol dose pack  Rx celebrex   Continue tylenol  -comp socks rec bombas  -take a break from the bike, let knee settle down, gentle ROM + HS stretches to prevent stiffness    Stat US LLE r/o DVT    F/u 2 weeks make sure improving     This note was generated with the assistance of ambient listening technology. Verbal consent was obtained by the patient and accompanying visitor(s) for the recording of patient appointment to facilitate this note. I attest to having reviewed and edited the generated note for accuracy, though some syntax or spelling errors may persist. Please contact the author of this note for any clarification.      Orders Placed This Encounter   Procedures    US Lower Extremity Veins Left     Standing Status:   Future     Standing Expiration Date:   4/2/2025       Medications Ordered This Encounter   Medications    celecoxib (CELEBREX) 200 MG capsule     Sig: Take 1 capsule (200 mg total) by mouth once daily.     Dispense:  30 capsule     Refill:  0    methocarbamoL (ROBAXIN) 750 MG Tab     Sig: Take 1 tablet (750 mg total) by mouth 3 (three) times daily as needed (muscle spasm).     Dispense:  60 tablet     Refill:  0    methylPREDNISolone (MEDROL DOSEPACK) 4 mg tablet     Sig: use as directed     Dispense:  1 each     Refill:  0        Past Medical History:   Diagnosis Date    Arthritis     History of adenomatous polyp of colon     Hypertension     Joint pain     Pneumonia     during college     Primary osteoarthritis of right knee 02/22/2016    Tendonitis of ankle or foot     Trouble in sleeping     Urinary incontinence        Past Surgical History:   Procedure Laterality Date    ARTHROPLASTY, KNEE, TOTAL, USING COMPUTER-ASSISTED NAVIGATION Left 1/8/2024    Procedure: ARTHROPLASTY, KNEE, TOTAL, USING COMPUTER-ASSISTED NAVIGATION: GAGANDEEP: LEFT: NICHOLAS MORGAN;  Surgeon: Zack Patton III, MD;  Location: [facility] OR;   Service: Orthopedics;  Laterality: Left;     SECTION          COLONOSCOPY N/A 2018    Procedure: COLONOSCOPY;  Surgeon: Jim Borges MD;  Location: [facility] ([address] [place] [address]);  Service: Endoscopy;  Laterality: N/A;    hemorrhiodectomy      tendon surgery L foot      TOTAL KNEE ARTHROPLASTY Right 2020    Procedure: ARTHROPLASTY, KNEE, TOTAL-DEPUY-SIGMA/DEPUY-TC3/MBT back up;  Surgeon: Zack Patton III, MD;  Location: [facility];  Service: Orthopedics;  Laterality: Right;       Family History   Problem Relation Age of Onset    Stroke Mother     Heart disease Mother 78        MI    Parkinsonism Father     No Known Problems Daughter     Melanoma Neg Hx     Psoriasis Neg Hx     Lupus Neg Hx     Eczema Neg Hx     Acne Neg Hx        Social History     Socioeconomic History    Marital status:      Spouse name: Salomon Mcdermott    Number of children: 1   Occupational History    Occupation: jeweler   Tobacco Use    Smoking status: Never    Smokeless tobacco: Never   Substance and Sexual Activity    Alcohol use: Yes     Alcohol/week: 1.0 - 2.0 standard drink of alcohol     Types: 1 - 2 Glasses of wine per week    Drug use: No    Sexual activity: Yes     Partners: Male   Other Topics Concern    Are you pregnant or think you may be? No    Breast-feeding No   Social History Narrative    Biking, walking with dog, but limits after knee replacement.        Bikes 60 min most days.     Social Determinants of Health     Financial Resource Strain: Low Risk  (2023)    Overall Financial Resource Strain (CARDIA)     Difficulty of Paying Living Expenses: Not hard at all   Food Insecurity: No Food Insecurity (2023)    Hunger Vital Sign     Worried About Running Out of Food in the Last Year: Never true     Ran Out of Food in the Last Year: Never true   Transportation Needs: No Transportation Needs (2023)    PRAPARE - Transportation     Lack of Transportation (Medical): No     Lack of Transportation  (Non-Medical): No   Physical Activity: Sufficiently Active (12/20/2023)    Exercise Vital Sign     Days of Exercise per Week: 6 days     Minutes of Exercise per Session: 40 min   Stress: No Stress Concern Present (12/20/2023)    [place] [employer]ational [employer] - Occupational Stress Questionnaire     Feeling of Stress : Not at all   Social Connections: Moderately Integrated (12/20/2023)    Social Connection and Isolation Panel [NHANES]     Frequency of Communication with Friends and Family: More than three times a week     Frequency of Social Gatherings with Friends and Family: More than three times a week     Attends Congregation Services: Never     Active Member of Clubs or Organizations: Yes     Attends Club or Organization Meetings: More than 4 times per year     Marital Status:    Housing Stability: Low Risk  (12/20/2023)    Housing Stability Vital Sign     Unable to Pay for Housing in the Last Year: No     Number of Places Lived in the Last Year: 1     Unstable Housing in the Last Year: No

## 2024-04-03 ENCOUNTER — TELEPHONE (OUTPATIENT)
Dept: ORTHOPEDICS | Facility: CLINIC | Age: 79
End: 2024-04-03
Payer: MEDICARE

## 2024-04-03 DIAGNOSIS — Z96.652 STATUS POST LEFT KNEE REPLACEMENT: Primary | ICD-10-CM

## 2024-04-03 DIAGNOSIS — M79.662 PAIN IN LEFT LOWER LEG: ICD-10-CM

## 2024-04-03 NOTE — TELEPHONE ENCOUNTER
"LLE US "Somewhat sluggish flow seen within 1 of the paired left peroneal veins but remains patent. This could be related to technical factors versus venous reflux; although, some component of nonocclusive thrombus not entirely excluded on the basis of this examination. Clinical correlation advised. Further evaluation/follow-up as warranted. "    Will send to Dr Kelly for his opinion re: how to proceed  "

## 2024-04-03 NOTE — TELEPHONE ENCOUNTER
"I called the patient today regarding  the message below. I told her that per Dr. Patton ".Please let her know that I think this is ok. But have put in a referral to dr harmeet patricio I would like his opinion on how to proceed and if anything else needs to be done." I left a message for the patient to call me back. I left my name and phone number.      -----    Zack Patton III, MD Bowers, [place]  Please let her know that I think this is ok  But have put in a referral to dr harmeet patricio I would like his opinion on how to proceed and if anything else needs to be done          Previous Messages       ----- Message -----  From: Interface, Rad Results In  Sent: 4/2/2024   3:35 PM CDT  To: Zack Patton III, MD  "

## 2024-04-04 ENCOUNTER — DOCUMENTATION ONLY (OUTPATIENT)
Dept: CARDIOLOGY | Facility: CLINIC | Age: 79
End: 2024-04-04
Payer: MEDICARE

## 2024-04-04 ENCOUNTER — PATIENT MESSAGE (OUTPATIENT)
Dept: ORTHOPEDICS | Facility: CLINIC | Age: 79
End: 2024-04-04
Payer: MEDICARE

## 2024-04-04 DIAGNOSIS — I82.402 LEG DVT (DEEP VENOUS THROMBOEMBOLISM), ACUTE, LEFT: Primary | ICD-10-CM

## 2024-04-04 NOTE — PROGRESS NOTES
"  Mrs. Castle reports progressive lower leg swelling 3 months after left TKA.  Venous ultrasound on 4/2/2024 documents "somewhat sluggish flow seen within 1 of the paired left peroneal veins but remains patent.  This could be related to technical factors versus venous reflux; although, some component of nonocclusive thrombus not entirely excluded on the basis of this examination.  Clinical correlation advised.  Further evaluation/follow-up as warranted."   I have reviewed the images and recommend treating empirically for peroneal vein DVT.    Plan:  -Start Eliquis 10 mg bid with the 1st 7 days, then reduce to 5 mg bid thereafter.  -Pt scheduled to see me in clinic on 4/8/2024.  -Repeat LLE venous ultrasound in 3 months.  -Ultrasound results and plan discussed in detail with Mrs. Castle, who voiced understanding.    "

## 2024-04-08 ENCOUNTER — OFFICE VISIT (OUTPATIENT)
Dept: CARDIOLOGY | Facility: CLINIC | Age: 79
End: 2024-04-08
Payer: MEDICARE

## 2024-04-08 ENCOUNTER — PATIENT MESSAGE (OUTPATIENT)
Dept: CARDIOLOGY | Facility: CLINIC | Age: 79
End: 2024-04-08

## 2024-04-08 VITALS
HEIGHT: 71 IN | WEIGHT: 179.88 LBS | BODY MASS INDEX: 25.18 KG/M2 | HEART RATE: 60 BPM | DIASTOLIC BLOOD PRESSURE: 67 MMHG | SYSTOLIC BLOOD PRESSURE: 119 MMHG

## 2024-04-08 DIAGNOSIS — I82.402 LEG DVT (DEEP VENOUS THROMBOEMBOLISM), ACUTE, LEFT: Primary | ICD-10-CM

## 2024-04-08 DIAGNOSIS — I89.0 LYMPHEDEMA OF BOTH LOWER EXTREMITIES: ICD-10-CM

## 2024-04-08 DIAGNOSIS — I83.893 VARICOSE VEINS OF BOTH LEGS WITH EDEMA: ICD-10-CM

## 2024-04-08 DIAGNOSIS — M79.662 PAIN IN LEFT LOWER LEG: ICD-10-CM

## 2024-04-08 DIAGNOSIS — R60.0 EDEMA OF BOTH LOWER EXTREMITIES: ICD-10-CM

## 2024-04-08 DIAGNOSIS — I10 PRIMARY HYPERTENSION: ICD-10-CM

## 2024-04-08 PROCEDURE — 99205 OFFICE O/P NEW HI 60 MIN: CPT | Mod: HCNC,S$GLB,, | Performed by: INTERNAL MEDICINE

## 2024-04-08 PROCEDURE — 3078F DIAST BP <80 MM HG: CPT | Mod: HCNC,CPTII,S$GLB, | Performed by: INTERNAL MEDICINE

## 2024-04-08 PROCEDURE — 99999 PR PBB SHADOW E&M-EST. PATIENT-LVL V: CPT | Mod: PBBFAC,HCNC,, | Performed by: INTERNAL MEDICINE

## 2024-04-08 PROCEDURE — 3074F SYST BP LT 130 MM HG: CPT | Mod: HCNC,CPTII,S$GLB, | Performed by: INTERNAL MEDICINE

## 2024-04-08 PROCEDURE — 1159F MED LIST DOCD IN RCRD: CPT | Mod: HCNC,CPTII,S$GLB, | Performed by: INTERNAL MEDICINE

## 2024-04-08 PROCEDURE — 3288F FALL RISK ASSESSMENT DOCD: CPT | Mod: HCNC,CPTII,S$GLB, | Performed by: INTERNAL MEDICINE

## 2024-04-08 PROCEDURE — 1101F PT FALLS ASSESS-DOCD LE1/YR: CPT | Mod: HCNC,CPTII,S$GLB, | Performed by: INTERNAL MEDICINE

## 2024-04-08 PROCEDURE — 1126F AMNT PAIN NOTED NONE PRSNT: CPT | Mod: HCNC,CPTII,S$GLB, | Performed by: INTERNAL MEDICINE

## 2024-04-08 NOTE — PATIENT INSTRUCTIONS
Assessment/Plan:  Aurora Castle is a 78 y.o. female with HTN, arthritis, s/p left TKA (1/8/2024), who presents for an initial appointment.    LLE DVT- Appears unprovoked, as left TKA was done on 1/8/2024 and DVT diagnosed 4/2/2024.  Refer to Heme/Onc for hypercoagulable workup and age-appropriate cancer screening.    2. HTN- Continue current medications.     3. BLE Edema- Due to mild lymphedema and possible venous insufficiency.  Check BLE venous reflux study.  Will refer to lymphedema clinic after DVT has been appropriated treated.  Recommend wearing graduated compression hose.  Limit sodium intake to 2000 mg daily.  Limit volume intake to 1.5 L daily.  Elevate legs when resting.    Follow up in 3 months with BLE venous reflux study prior

## 2024-04-08 NOTE — PROGRESS NOTES
"[facility]      Chief Complaint   Patient presents with    Pain in left lower leg       Patient ID: Aurora Castle is a 78 y.o. female with HTN, arthritis, s/p left TKA (1/8/2024), who presents for an initial appointment.  Pertinent history/events are as follows:    -Pt kindly referred by Dr. Patton for evaluation of LLE DVT.    -4/4/2024 telephone visit: Mrs. Castle reports progressive lower leg swelling 3 months after left TKA.  LLE Venous ultrasound on 4/2/2024 documents "somewhat sluggish flow seen within 1 of the paired left peroneal veins but remains patent.  This could be related to technical factors versus venous reflux; although, some component of nonocclusive thrombus not entirely excluded on the basis of this examination."  I have reviewed the images and recommend treating empirically for peroneal vein DVT.    Plan:  -Start Eliquis 10 mg bid with the 1st 7 days, then reduce to 5 mg bid thereafter.  -Pt scheduled to see me in clinic on 4/8/2024.  -Repeat LLE venous ultrasound in 3 months.  -Ultrasound results and plan discussed in detail with Mrs. Castle, who voiced understanding.    HPI:  Mrs. Castle reports pain and swelling the the left lower leg starting 10 days ago (3/29/2024).  LLE Venous ultrasound on 4/2/2024 documents "somewhat sluggish flow seen within 1 of the paired left peroneal veins but remains patent.  This could be related to technical factors versus venous reflux; although, some component of nonocclusive thrombus not entirely excluded on the basis of this examination."  I reviewed the images and pt was started on full dose anticoagulation with apixaban for empiric treatment of peroneal vein DVT.  She reports no bleeding issues.    Past Medical History:   Diagnosis Date    Arthritis     History of adenomatous polyp of colon     Hypertension     Joint pain     Pneumonia     during college     Primary osteoarthritis of right knee 02/22/2016    Tendonitis of ankle or foot  "    Trouble in sleeping     Urinary incontinence      Past Surgical History:   Procedure Laterality Date    ARTHROPLASTY, KNEE, TOTAL, USING COMPUTER-ASSISTED NAVIGATION Left 2024    Procedure: ARTHROPLASTY, KNEE, TOTAL, USING COMPUTER-ASSISTED NAVIGATION: GAGANDEEP: LEFT: NICHOLAS - TRIATHALON;  Surgeon: Zack Patton III, MD;  Location: [facility];  Service: Orthopedics;  Laterality: Left;     SECTION          COLONOSCOPY N/A 2018    Procedure: COLONOSCOPY;  Surgeon: Jim Borges MD;  [facility]  Service: Endoscopy;  Laterality: N/A;    hemorrhiodectomy      tendon surgery L foot      TOTAL KNEE ARTHROPLASTY Right 2020    Procedure: ARTHROPLASTY, KNEE, TOTAL-DEPUY-SIGMA/DEPUY-TC3/MBT back up;  Surgeon: Zack Patton III, MD;  Location: [facility];  Service: Orthopedics;  Laterality: Right;     Social History     Socioeconomic History    Marital status:      Spouse name: Salomon Mcdermott    Number of children: 1   Occupational History    Occupation: jeweler   Tobacco Use    Smoking status: Never    Smokeless tobacco: Never   Substance and Sexual Activity    Alcohol use: Yes     Alcohol/week: 1.0 - 2.0 standard drink of alcohol     Types: 1 - 2 Glasses of wine per week    Drug use: No    Sexual activity: Yes     Partners: Male   Other Topics Concern    Are you pregnant or think you may be? No    Breast-feeding No   Social History Narrative    Biking, walking with dog, but limits after knee replacement.        Bikes 60 min most days.     Social Determinants of Health     Financial Resource Strain: Low Risk  (2023)    Overall Financial Resource Strain (CARDIA)     Difficulty of Paying Living Expenses: Not hard at all   Food Insecurity: No Food Insecurity (2023)    Hunger Vital Sign     Worried About Running Out of Food in the Last Year: Never true     Ran Out of Food in the Last Year: Never true   Transportation Needs: No Transportation Needs (2023)     PRAPARE - Transportation     Lack of Transportation (Medical): No     Lack of Transportation (Non-Medical): No   Physical Activity: Sufficiently Active (12/20/2023)    Exercise Vital Sign     Days of Exercise per Week: 6 days     Minutes of Exercise per Session: 40 min   Stress: No Stress Concern Present (12/20/2023)    [place] of [employer]ational Health - Occupational Stress Questionnaire     Feeling of Stress : Not at all   Social Connections: Moderately Integrated (12/20/2023)    Social Connection and Isolation Panel [NHANES]     Frequency of Communication with Friends and Family: More than three times a week     Frequency of Social Gatherings with Friends and Family: More than three times a week     Attends Rastafari Services: Never     Active Member of Clubs or Organizations: Yes     Attends Club or Organization Meetings: More than 4 times per year     Marital Status:    Housing Stability: Low Risk  (12/20/2023)    Housing Stability Vital Sign     Unable to Pay for Housing in the Last Year: No     Number of Places Lived in the Last Year: 1     Unstable Housing in the Last Year: No     Family History   Problem Relation Age of Onset    Stroke Mother     Heart disease Mother 78        MI    Parkinsonism Father     No Known Problems Daughter     Melanoma Neg Hx     Psoriasis Neg Hx     Lupus Neg Hx     Eczema Neg Hx     Acne Neg Hx        Review of patient's allergies indicates:   Allergen Reactions    Erythromycin Itching and Swelling     Eye swelling   Local eye ointment       Medication List with Changes/Refills   Current Medications    ACETAMINOPHEN (TYLENOL) 650 MG TBSR    Take 1 tablet (650 mg total) by mouth every 8 (eight) hours.    APIXABAN (ELIQUIS) 5 MG TAB    Take 2 tablets (10 mg total) by mouth 2 (two) times daily for the first 7 days, then reduce to 1 tablet (5 mg total) by mouth 2 (two) times daily thereafter.    ASCORBIC ACID, VITAMIN C, (VITAMIN C) 1000 MG TABLET    Take 1 tablet by  mouth Daily. Hold medication one week prior to surgery    ASPIRIN (ECOTRIN) 81 MG EC TABLET    Take 1 tablet (81 mg total) by mouth 2 (two) times a day.    BIOTIN ORAL    Take 5,000 mcg by mouth once daily. Hold medication one week prior to surgery    CELECOXIB (CELEBREX) 200 MG CAPSULE    Take 1 capsule (200 mg total) by mouth once daily.    CHOLECALCIFEROL, VITAMIN D3, (VITAMIN D3) 5,000 UNIT TAB    Take 1 tablet by mouth once daily.    DOXYLAMINE SUCCINATE (UNISOM, DOXYLAMINE, ORAL)    Take by mouth every evening. TAKES AT NIGHT    FISH OIL-OMEGA-3 FATTY ACIDS 300-1,000 MG CAPSULE    Take 2 g by mouth once daily. Hold medication one week prior to surgery    GLUCOSAMINE-CHONDROIT-VIT C--046-06-2.5 MG CAP    Take 1 capsule by mouth once daily. Hold medication one week prior to surgery    MELATONIN ORAL    Take by mouth every evening. TAKES AT NIGHT    METHOCARBAMOL (ROBAXIN) 750 MG TAB    Take 1 tablet (750 mg total) by mouth 3 (three) times daily as needed (muscle spasm).    METHYLPREDNISOLONE (MEDROL DOSEPACK) 4 MG TABLET    use as directed    MULTIVITAMIN WITH MINERALS (MULTIVITAMIN & MINERAL FORMULA) TAB    Take 1 tablet by mouth Daily. Hold medication one week prior to surgery    PANTOPRAZOLE (PROTONIX) 40 MG TABLET    Take 1 tablet (40 mg total) by mouth once daily.    PREGABALIN (LYRICA) 75 MG CAPSULE    Take 1 capsule (75 mg total) by mouth 2 (two) times daily.    SENNA-DOCUSATE 8.6-50 MG (SENNA WITH DOCUSATE SODIUM) 8.6-50 MG PER TABLET    Take 1 tablet by mouth once daily.    TRAMADOL (ULTRAM) 50 MG TABLET    Take 1 tablet (50 mg total) by mouth every 12 (twelve) hours as needed for Pain.    VALSARTAN (DIOVAN) 80 MG TABLET    Take 1 tablet (80 mg total) by mouth once daily.   Discontinued Medications    CEFADROXIL (DURICEF) 500 MG CAP    Take 1 capsule (500 mg total) by mouth every 12 (twelve) hours.       Review of Systems  Constitution: Denies chills, fever, and sweats.  HENT: Denies headaches or  "blurry vision.  Cardiovascular: Denies chest pain or irregular heart beat.  Respiratory: Denies cough or shortness of breath.  Gastrointestinal: Denies abdominal pain, nausea, or vomiting.  Musculoskeletal: Positive for leg swelling and LLE pain.  Neurological: Denies dizziness or focal weakness.  Psychiatric/Behavioral: Normal mental status.  Hematologic/Lymphatic: Denies bleeding problem or easy bruising/bleeding.  Skin: Denies rash or suspicious lesions    Physical Examination  Ht 5' 11" (1.803 m)   Wt 81.6 kg (179 lb 14.3 oz)   LMP  (LMP Unknown)   BMI 25.09 kg/m²     Constitutional: No acute distress, conversant  HEENT: Sclera anicteric, Pupils equal, round and reactive to light, extraocular motions intact, Oropharynx clear  Neck: No JVD, no carotid bruits  Cardiovascular: regular rate and rhythm, no murmur, rubs or gallops, normal S1/S2  Pulmonary: Clear to auscultation bilaterally  Abdominal: Abdomen soft, nontender, nondistended, positive bowel sounds  Extremities: BLE's with mild pitting edema, prominent varicose veins and changes consistent with mild lymphedema (L>R)   Pulses:  Carotid pulses are 2+ on the right side, and 2+ on the left side.  Radial pulses are 2+ on the right side, and 2+ on the left side.   Femoral pulses are 2+ on the right side, and 2+ on the left side.  Popliteal pulses are 2+ on the right side, and 2+ on the left side.   Dorsalis pedis pulses are 2+ on the right side, and 2+ on the left side.   Posterior tibial pulses are 2+ on the right side, and 2+ on the left side.    Skin: No ecchymosis, erythema, or ulcers  Psych: Alert and oriented x 3, appropriate affect  Neuro: CNII-XII intact, no focal deficits    Labs:  Most Recent Data  CBC:   Lab Results   Component Value Date    WBC 5.68 12/21/2023    HGB 13.2 12/21/2023    HCT 37.5 12/21/2023     12/21/2023    MCV 95 12/21/2023    RDW 13.0 12/21/2023     BMP:   Lab Results   Component Value Date     12/21/2023    K 4.3 " "12/21/2023     12/21/2023    CO2 24 12/21/2023    BUN 21 12/21/2023    CREATININE 0.7 12/21/2023    GLU 98 12/21/2023    CALCIUM 9.4 12/21/2023     LFTS;   Lab Results   Component Value Date    PROT 7.0 12/21/2023    ALBUMIN 4.1 12/21/2023    BILITOT 0.5 12/21/2023    AST 22 12/21/2023    ALKPHOS 63 12/21/2023    ALT 17 12/21/2023     COAGS:   Lab Results   Component Value Date    INR 1.0 12/21/2023     FLP:   Lab Results   Component Value Date    CHOL 140 11/28/2022    HDL 64 11/28/2022    LDLCALC 68.6 11/28/2022    TRIG 37 11/28/2022    CHOLHDL 45.7 11/28/2022     CARDIAC: No results found for: "TROPONINI", "CKTOTAL", "CKMB", "BNP"    Imaging:    LLE Venous Ultrasound 4/2/2024:  No evidence of deep venous thrombosis in the left lower extremity above the knee.  Somewhat sluggish flow seen within 1 of the paired left peroneal veins but remains patent.  This could be related to technical factors versus venous reflux; although, some component of nonocclusive thrombus not entirely excluded on the basis of this examination.  Clinical correlation advised.  Further evaluation/follow-up as warranted.    Assessment/Plan:  Aurora Castle is a 78 y.o. female with HTN, arthritis, s/p left TKA (1/8/2024), who presents for an initial appointment.    LLE DVT- Appears unprovoked, as left TKA was done on 1/8/2024 and DVT diagnosed 4/2/2024.  Refer to Heme/Onc for hypercoagulable workup and age-appropriate cancer screening.    2. HTN- Continue current medications.     3. BLE Edema- Due to mild lymphedema and possible venous insufficiency.  Check BLE venous reflux study.  Will refer to lymphedema clinic after DVT has been appropriated treated.  Recommend wearing graduated compression hose.  Limit sodium intake to 2000 mg daily.  Limit volume intake to 1.5 L daily.  Elevate legs when resting.    Follow up in 3 months with BLE venous reflux study prior    Total duration of face to face visit time 30 minutes.  Total time spent " counseling greater than fifty percent of total visit time.  Counseling included discussion regarding imaging findings, diagnosis, possibilities, treatment options, risks and benefits.  The patient had many questions regarding the options and long-term effects.    Trev Kelly MD, PhD  Interventional Cardiology

## 2024-04-16 ENCOUNTER — OFFICE VISIT (OUTPATIENT)
Dept: ORTHOPEDICS | Facility: CLINIC | Age: 79
End: 2024-04-16
Payer: MEDICARE

## 2024-04-16 VITALS — HEIGHT: 70 IN | BODY MASS INDEX: 24.62 KG/M2 | WEIGHT: 172 LBS

## 2024-04-16 DIAGNOSIS — Z96.652 STATUS POST LEFT KNEE REPLACEMENT: Primary | ICD-10-CM

## 2024-04-16 PROCEDURE — 1159F MED LIST DOCD IN RCRD: CPT | Mod: HCNC,CPTII,S$GLB, | Performed by: ORTHOPAEDIC SURGERY

## 2024-04-16 PROCEDURE — 1101F PT FALLS ASSESS-DOCD LE1/YR: CPT | Mod: HCNC,CPTII,S$GLB, | Performed by: ORTHOPAEDIC SURGERY

## 2024-04-16 PROCEDURE — 1125F AMNT PAIN NOTED PAIN PRSNT: CPT | Mod: HCNC,CPTII,S$GLB, | Performed by: ORTHOPAEDIC SURGERY

## 2024-04-16 PROCEDURE — 99999 PR PBB SHADOW E&M-EST. PATIENT-LVL III: CPT | Mod: PBBFAC,HCNC,, | Performed by: ORTHOPAEDIC SURGERY

## 2024-04-16 PROCEDURE — 3288F FALL RISK ASSESSMENT DOCD: CPT | Mod: HCNC,CPTII,S$GLB, | Performed by: ORTHOPAEDIC SURGERY

## 2024-04-16 PROCEDURE — 99212 OFFICE O/P EST SF 10 MIN: CPT | Mod: HCNC,S$GLB,, | Performed by: ORTHOPAEDIC SURGERY

## 2024-04-16 NOTE — PROGRESS NOTES
Subjective:     HPI:   Aurora Castle is a 78 y.o. female who presents for rpt eval L knee     Being treated for presumed DVT by Dr Kelly     Swelling better   Wearing comp socks      History of Present Illness         Objective:   Body mass index is 25.04 kg/m².  Exam:    Physical Exam      TKA exam unchanged  LE swelling improved    Imaging:    Results      None today      Assessment/Plan:     No diagnosis found.     Sof tissue irritation better    Delayed presentation of ?incomplete DVT 3 months post op    Assessment/Plan:     Assessment & Plan      Appreciate Dr Kelly's management    Will provide thigh high TEDS today  Can also use bombas 15-20 v 20-30mmHG    3 month f/u     No orders of the defined types were placed in this encounter.      This note was generated with the assistance of ambient listening technology. Verbal consent was obtained by the patient and accompanying visitor(s) for the recording of patient appointment to facilitate this note. I attest to having reviewed and edited the generated note for accuracy, though some syntax or spelling errors may persist. Please contact the author of this note for any clarification.            Past Medical History:   Diagnosis Date    Arthritis     History of adenomatous polyp of colon     Hypertension     Joint pain     Pneumonia     during college     Primary osteoarthritis of right knee 2016    Tendonitis of ankle or foot     Trouble in sleeping     Urinary incontinence        Past Surgical History:   Procedure Laterality Date    ARTHROPLASTY, KNEE, TOTAL, USING COMPUTER-ASSISTED NAVIGATION Left 2024    Procedure: ARTHROPLASTY, KNEE, TOTAL, USING COMPUTER-ASSISTED NAVIGATION: GAGANDEEP: LEFT: NICHOLAS - EDDIE;  Surgeon: Zack Patton III, MD;  Location: [facility];  Service: Orthopedics;  Laterality: Left;     SECTION          COLONOSCOPY N/A 2018    Procedure: COLONOSCOPY;  Surgeon: Jim Borges MD;  Location: [facility]  (4TH FLR);  Service: Endoscopy;  Laterality: N/A;    hemorrhiodectomy      tendon surgery L foot      TOTAL KNEE ARTHROPLASTY Right 6/8/2020    Procedure: ARTHROPLASTY, KNEE, TOTAL-DEPUY-SIGMA/DEPUY-TC3/MBT back up;  Surgeon: Zack Patton III, MD;  Location: [facility];  Service: Orthopedics;  Laterality: Right;       Family History   Problem Relation Name Age of Onset    Stroke Mother Lucía Castle     Heart disease Mother Lucía Castle 78        MI    Parkinsonism Father Zack Castle     No Known Problems Daughter Joanie Mcdermott     Melanoma Neg Hx      Psoriasis Neg Hx      Lupus Neg Hx      Eczema Neg Hx      Acne Neg Hx         Social History     Socioeconomic History    Marital status:      Spouse name: Salomon Mcdermott    Number of children: 1   Occupational History    Occupation: jeweler   Tobacco Use    Smoking status: Never    Smokeless tobacco: Never   Substance and Sexual Activity    Alcohol use: Yes     Alcohol/week: 1.0 - 2.0 standard drink of alcohol     Types: 1 - 2 Glasses of wine per week    Drug use: No    Sexual activity: Yes     Partners: Male   Other Topics Concern    Are you pregnant or think you may be? No    Breast-feeding No   Social History Narrative    Biking, walking with dog, but limits after knee replacement.        Bikes 60 min most days.     Social Determinants of Health     Financial Resource Strain: Low Risk  (12/20/2023)    Overall Financial Resource Strain (CARDIA)     Difficulty of Paying Living Expenses: Not hard at all   Food Insecurity: No Food Insecurity (12/20/2023)    Hunger Vital Sign     Worried About Running Out of Food in the Last Year: Never true     Ran Out of Food in the Last Year: Never true   Transportation Needs: No Transportation Needs (12/20/2023)    PRAPARE - Transportation     Lack of Transportation (Medical): No     Lack of Transportation (Non-Medical): No   Physical Activity: Sufficiently Active (12/20/2023)    Exercise Vital Sign     Days of  Exercise per Week: 6 days     Minutes of Exercise per Session: 40 min   Stress: No Stress Concern Present (12/20/2023)    [employer] [place] [employer] - Occupational Stress Questionnaire     Feeling of Stress : Not at all   Social Connections: Moderately Integrated (12/20/2023)    Social Connection and Isolation Panel [NHANES]     Frequency of Communication with Friends and Family: More than three times a week     Frequency of Social Gatherings with Friends and Family: More than three times a week     Attends Gnosticism Services: Never     Active Member of Clubs or Organizations: Yes     Attends Club or Organization Meetings: More than 4 times per year     Marital Status:    Housing Stability: Low Risk  (12/20/2023)    Housing Stability Vital Sign     Unable to Pay for Housing in the Last Year: No     Number of Places Lived in the Last Year: 1     Unstable Housing in the Last Year: No

## 2024-04-22 ENCOUNTER — PATIENT MESSAGE (OUTPATIENT)
Dept: ORTHOPEDICS | Facility: CLINIC | Age: 79
End: 2024-04-22
Payer: MEDICARE

## 2024-04-22 ENCOUNTER — PATIENT MESSAGE (OUTPATIENT)
Dept: CARDIOLOGY | Facility: CLINIC | Age: 79
End: 2024-04-22
Payer: MEDICARE

## 2024-04-23 ENCOUNTER — PATIENT MESSAGE (OUTPATIENT)
Dept: ORTHOPEDICS | Facility: CLINIC | Age: 79
End: 2024-04-23
Payer: MEDICARE

## 2024-04-23 ENCOUNTER — PATIENT MESSAGE (OUTPATIENT)
Dept: CARDIOLOGY | Facility: CLINIC | Age: 79
End: 2024-04-23
Payer: MEDICARE

## 2024-04-23 DIAGNOSIS — Z96.652 S/P TOTAL KNEE REPLACEMENT, LEFT: ICD-10-CM

## 2024-04-23 RX ORDER — METHOCARBAMOL 750 MG/1
750 TABLET, FILM COATED ORAL 3 TIMES DAILY PRN
Qty: 60 TABLET | Refills: 0 | Status: SHIPPED | OUTPATIENT
Start: 2024-04-23

## 2024-04-23 RX ORDER — CELECOXIB 200 MG/1
200 CAPSULE ORAL DAILY
Qty: 30 CAPSULE | Refills: 0 | Status: SHIPPED | OUTPATIENT
Start: 2024-04-23 | End: 2024-06-03

## 2024-04-23 NOTE — TELEPHONE ENCOUNTER
Patient sent a [facility] messages asking for a refill of her celebrex and robaxin. I told her that I would pass this along to Dr. Patton. The patient verbalized understanding and has no further questions.

## 2024-05-06 NOTE — PROGRESS NOTES
"PATIENT: Aurora Castle  MRN: [id number]  DATE: 5/9/2024    Diagnosis:   1. Acute deep vein thrombosis of peroneal vein, left    2. Senile purpura    3. Advance care planning      Chief Complaint: Deep Vein Thrombosis    Oncologic History:      Oncologic History     Oncologic Treatment     Pathology       Subjective:    History of Present Illness: Ms. Castle is a 78 y.o. female who presents for evaluation and management of deep vein thrombosis of left peroneal vein. She is referred by Dr. Kelly.    - information per Dr. Kelly's note dated 4/8/24:  "-4/4/2024 telephone visit: Mrs. Castle reports progressive lower leg swelling 3 months after left TKA.  LLE Venous ultrasound on 4/2/2024 documents "somewhat sluggish flow seen within 1 of the paired left peroneal veins but remains patent.  This could be related to technical factors versus venous reflux; although, some component of nonocclusive thrombus not entirely excluded on the basis of this examination."  I have reviewed the images and recommend treating empirically for peroneal vein DVT.    Plan:  -Start Eliquis 10 mg bid with the 1st 7 days, then reduce to 5 mg bid thereafter.  -Repeat LLE venous ultrasound in 3 months."    - today, she is doing well. She endorses knee pain. She has no issues with apixaban. She denies shortness of breath, chest pain, nausea, vomiting, diarrhea, constipation.        Past medical, surgical, family, and social histories have been reviewed and updated below.    Past Medical History:   Past Medical History:   Diagnosis Date    Arthritis     History of adenomatous polyp of colon     Hypertension     Joint pain     Pneumonia     during college     Primary osteoarthritis of right knee 02/22/2016    Tendonitis of ankle or foot     Trouble in sleeping     Urinary incontinence        Past Surgical History:   Past Surgical History:   Procedure Laterality Date    ARTHROPLASTY, KNEE, TOTAL, USING COMPUTER-ASSISTED NAVIGATION Left 1/8/2024 "    Procedure: ARTHROPLASTY, KNEE, TOTAL, USING COMPUTER-ASSISTED NAVIGATION: GAGANDEEP: LEFT: NICHOLAS - TRIATHALON;  Surgeon: Zack Patton III, MD;  Location: [facility];  Service: Orthopedics;  Laterality: Left;     SECTION          COLONOSCOPY N/A 2018    Procedure: COLONOSCOPY;  Surgeon: Jim Borges MD;  Location: [facility] (4TH FLR);  Service: Endoscopy;  Laterality: N/A;    hemorrhiodectomy      tendon surgery L foot      TOTAL KNEE ARTHROPLASTY Right 2020    Procedure: ARTHROPLASTY, KNEE, TOTAL-DEPUY-SIGMA/DEPUY-TC3/MBT back up;  Surgeon: Zack Patton III, MD;  Location: [facility];  Service: Orthopedics;  Laterality: Right;       Family History:   Family History   Problem Relation Name Age of Onset    Stroke Mother Lucía Castle     Heart disease Mother Lucía Castle 78        MI    Parkinsonism Father Zack Castle     No Known Problems Daughter Joanie Mcdermott     Melanoma Neg Hx      Psoriasis Neg Hx      Lupus Neg Hx      Eczema Neg Hx      Acne Neg Hx         Social History:  reports that she has never smoked. She has never used smokeless tobacco. She reports current alcohol use of about 1.0 - 2.0 standard drink of alcohol per week. She reports that she does not use drugs.    Allergies:  Review of patient's allergies indicates:   Allergen Reactions    Erythromycin Itching and Swelling     Eye swelling   Local eye ointment       Medications:  Current Outpatient Medications   Medication Sig Dispense Refill    acetaminophen (TYLENOL) 650 MG TbSR Take 1 tablet (650 mg total) by mouth every 8 (eight) hours. 120 tablet 0    apixaban (ELIQUIS) 5 mg Tab Take 2 tablets (10 mg total) by mouth 2 (two) times daily for the first 7 days, then reduce to 1 tablet (5 mg total) by mouth 2 (two) times daily thereafter. 60 tablet 11    ascorbic acid, vitamin C, (VITAMIN C) 1000 MG tablet Take 1 tablet by mouth Daily. Hold medication one week prior to surgery      aspirin (ECOTRIN) 81 MG EC tablet  Take 1 tablet (81 mg total) by mouth 2 (two) times a day. (Patient not taking: Reported on 4/8/2024) 60 tablet 0    BIOTIN ORAL Take 5,000 mcg by mouth once daily. Hold medication one week prior to surgery      celecoxib (CELEBREX) 200 MG capsule Take 1 capsule (200 mg total) by mouth once daily. 30 capsule 0    cholecalciferol, vitamin D3, (VITAMIN D3) 5,000 unit Tab Take 1 tablet by mouth once daily.      doxylamine succinate (UNISOM, DOXYLAMINE, ORAL) Take by mouth every evening. TAKES AT NIGHT      fish oil-omega-3 fatty acids 300-1,000 mg capsule Take 2 g by mouth once daily. Hold medication one week prior to surgery      glucosamine-chondroit-vit C-Mn 249-946-97-2.5 mg Cap Take 1 capsule by mouth once daily. Hold medication one week prior to surgery      MELATONIN ORAL Take by mouth every evening. TAKES AT NIGHT      methocarbamoL (ROBAXIN) 750 MG Tab Take 1 tablet (750 mg total) by mouth 3 (three) times daily as needed (muscle spasm). 60 tablet 0    MULTIVITAMIN WITH MINERALS (MULTIVITAMIN & MINERAL FORMULA) Tab Take 1 tablet by mouth Daily. Hold medication one week prior to surgery      senna-docusate 8.6-50 mg (SENNA WITH DOCUSATE SODIUM) 8.6-50 mg per tablet Take 1 tablet by mouth once daily. (Patient not taking: Reported on 4/8/2024) 30 tablet 0    traMADoL (ULTRAM) 50 mg tablet Take 1 tablet (50 mg total) by mouth every 12 (twelve) hours as needed for Pain. (Patient not taking: Reported on 4/16/2024) 30 tablet 0    valsartan (DIOVAN) 80 MG tablet Take 1 tablet (80 mg total) by mouth once daily. 90 tablet 2     No current facility-administered medications for this visit.       Review of Systems   Constitutional:  Negative for fatigue.   HENT:  Negative for sore throat.    Eyes:  Negative for visual disturbance.   Respiratory:  Negative for cough and shortness of breath.    Cardiovascular:  Negative for chest pain.   Gastrointestinal:  Negative for abdominal pain, constipation, diarrhea, nausea and  vomiting.   Genitourinary:  Negative for dysuria.   Musculoskeletal:  Negative for back pain.        Knee pain   Skin:  Negative for rash.   Neurological:  Negative for headaches.   Hematological:  Negative for adenopathy.   Psychiatric/Behavioral:  The patient is not nervous/anxious.        ECOG Performance Status:   ECOG SCORE    1 - Restricted in strenuous activity-ambulatory and able to carry out work of a light nature         Objective:      Vitals:   Vitals:    05/09/24 1113   BP: 133/82   Pulse: 72   SpO2: 97%   Weight: 81.1 kg (178 lb 12.7 oz)     BMI: Body mass index is 26.02 kg/m².    Physical Exam  Vitals and nursing note reviewed.   Constitutional:       Appearance: She is well-developed.   HENT:      Head: Normocephalic and atraumatic.   Eyes:      Pupils: Pupils are equal, round, and reactive to light.   Cardiovascular:      Rate and Rhythm: Normal rate and regular rhythm.   Pulmonary:      Effort: Pulmonary effort is normal.      Breath sounds: Normal breath sounds.   Abdominal:      General: Bowel sounds are normal.      Palpations: Abdomen is soft.   Musculoskeletal:         General: Normal range of motion.      Cervical back: Normal range of motion and neck supple.   Skin:     General: Skin is warm and dry.   Neurological:      Mental Status: She is alert and oriented to person, place, and time.   Psychiatric:         Behavior: Behavior normal.         Thought Content: Thought content normal.         Judgment: Judgment normal.         Laboratory Data:  Labs have been reviewed.    Lab Results   Component Value Date    WBC 5.68 12/21/2023    HGB 13.2 12/21/2023    HCT 37.5 12/21/2023    MCV 95 12/21/2023     12/21/2023           Imaging:    Ultrasound left lower extremity (4/2/24):  Somewhat sluggish flow seen within 1 of the paired left peroneal veins but remains patent. This could be related to technical factors versus venous reflux; although, some component of nonocclusive thrombus not  entirely excluded on the basis of this examination. Clinical correlation advised. Further evaluation/follow-up as warranted.       Assessment:       1. Acute deep vein thrombosis of peroneal vein, left    2. Senile purpura    3. Advance care planning           Plan:     Acute deep vein thrombosis of left peroneal vein  - I have reviewed her chart  - diagnosed with acute deep vein thrombosis of left peroneal vein on 4/2/24. Of note, she had undergone a left total knee arthroplasty on 1/8/24.  - she denies history of previous thrombosis  - it sounds like she had a provoked thrombosis.  - I will perform an abbreviated hypercoagulable workup.  - recommend at least 3 months of full-dose anticoagulation. She is scheduled for follow-up ultrasound and appt with cardiology in late June/early July. Depending on results of repeat ultrasound as well as her clinical symptoms, may consider discontinuation of anticoagulation.  - I will contact her with results of hypercoagulable workup.    2. Senile purpura  - Noted on exam  - continue to monitor    3. Advance Care Planning     Date: 05/08/2024    Power of   I initiated the process of voluntary advance care planning today and explained the importance of this process to the patient.  I introduced the concept of advance directives to the patient, as well. Then the patient received detailed information about the importance of designating a Health Care Power of  (HCPOA). She was also instructed to communicate with this person about their wishes for future healthcare, should she become sick and lose decision-making capacity. The patient has not previously appointed a HCPOA. After our discussion, the patient has decided not to complete a HCPOA.          - I will contact her with results of hypercoagulable workup.    Johann Kumar M.D.  Hematology/Oncology  [facility] [address], [address]  [place] [address]  Phone: [phone]  Fax:  [phone]

## 2024-05-09 ENCOUNTER — LAB VISIT (OUTPATIENT)
Dept: LAB | Facility: HOSPITAL | Age: 79
End: 2024-05-09
Attending: INTERNAL MEDICINE
Payer: MEDICARE

## 2024-05-09 ENCOUNTER — OFFICE VISIT (OUTPATIENT)
Dept: HEMATOLOGY/ONCOLOGY | Facility: CLINIC | Age: 79
End: 2024-05-09
Payer: MEDICARE

## 2024-05-09 VITALS
WEIGHT: 178.81 LBS | BODY MASS INDEX: 26.02 KG/M2 | HEART RATE: 72 BPM | OXYGEN SATURATION: 97 % | DIASTOLIC BLOOD PRESSURE: 82 MMHG | SYSTOLIC BLOOD PRESSURE: 133 MMHG

## 2024-05-09 DIAGNOSIS — D69.2 SENILE PURPURA: ICD-10-CM

## 2024-05-09 DIAGNOSIS — I82.452: ICD-10-CM

## 2024-05-09 DIAGNOSIS — I82.452: Primary | ICD-10-CM

## 2024-05-09 DIAGNOSIS — Z71.89 ADVANCE CARE PLANNING: ICD-10-CM

## 2024-05-09 DIAGNOSIS — I82.402 LEG DVT (DEEP VENOUS THROMBOEMBOLISM), ACUTE, LEFT: ICD-10-CM

## 2024-05-09 PROCEDURE — 1158F ADVNC CARE PLAN TLK DOCD: CPT | Mod: HCNC,CPTII,S$GLB, | Performed by: INTERNAL MEDICINE

## 2024-05-09 PROCEDURE — 3075F SYST BP GE 130 - 139MM HG: CPT | Mod: HCNC,CPTII,S$GLB, | Performed by: INTERNAL MEDICINE

## 2024-05-09 PROCEDURE — 3079F DIAST BP 80-89 MM HG: CPT | Mod: HCNC,CPTII,S$GLB, | Performed by: INTERNAL MEDICINE

## 2024-05-09 PROCEDURE — 81241 F5 GENE: CPT | Mod: HCNC | Performed by: INTERNAL MEDICINE

## 2024-05-09 PROCEDURE — 99204 OFFICE O/P NEW MOD 45 MIN: CPT | Mod: HCNC,S$GLB,, | Performed by: INTERNAL MEDICINE

## 2024-05-09 PROCEDURE — 81240 F2 GENE: CPT | Mod: HCNC | Performed by: INTERNAL MEDICINE

## 2024-05-09 PROCEDURE — 1125F AMNT PAIN NOTED PAIN PRSNT: CPT | Mod: HCNC,CPTII,S$GLB, | Performed by: INTERNAL MEDICINE

## 2024-05-09 PROCEDURE — 36415 COLL VENOUS BLD VENIPUNCTURE: CPT | Mod: HCNC | Performed by: INTERNAL MEDICINE

## 2024-05-09 PROCEDURE — 1160F RVW MEDS BY RX/DR IN RCRD: CPT | Mod: HCNC,CPTII,S$GLB, | Performed by: INTERNAL MEDICINE

## 2024-05-09 PROCEDURE — 1101F PT FALLS ASSESS-DOCD LE1/YR: CPT | Mod: HCNC,CPTII,S$GLB, | Performed by: INTERNAL MEDICINE

## 2024-05-09 PROCEDURE — 1159F MED LIST DOCD IN RCRD: CPT | Mod: HCNC,CPTII,S$GLB, | Performed by: INTERNAL MEDICINE

## 2024-05-09 PROCEDURE — 99999 PR PBB SHADOW E&M-EST. PATIENT-LVL IV: CPT | Mod: PBBFAC,HCNC,, | Performed by: INTERNAL MEDICINE

## 2024-05-09 PROCEDURE — 3288F FALL RISK ASSESSMENT DOCD: CPT | Mod: HCNC,CPTII,S$GLB, | Performed by: INTERNAL MEDICINE

## 2024-05-13 LAB
F2 C.20210G>A GENO BLD/T: NEGATIVE
F5 P.R506Q BLD/T QL: NEGATIVE

## 2024-05-16 ENCOUNTER — PATIENT MESSAGE (OUTPATIENT)
Dept: ORTHOPEDICS | Facility: CLINIC | Age: 79
End: 2024-05-16
Payer: MEDICARE

## 2024-05-17 ENCOUNTER — PATIENT MESSAGE (OUTPATIENT)
Dept: ORTHOPEDICS | Facility: CLINIC | Age: 79
End: 2024-05-17
Payer: MEDICARE

## 2024-05-22 ENCOUNTER — PATIENT MESSAGE (OUTPATIENT)
Dept: INTERNAL MEDICINE | Facility: CLINIC | Age: 79
End: 2024-05-22
Payer: MEDICARE
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
## 2024-05-23 ENCOUNTER — TELEPHONE (OUTPATIENT)
Dept: HEMATOLOGY/ONCOLOGY | Facility: CLINIC | Age: 79
End: 2024-05-23
Payer: MEDICARE

## 2024-05-23 ENCOUNTER — OFFICE VISIT (OUTPATIENT)
Dept: INTERNAL MEDICINE | Facility: CLINIC | Age: 79
End: 2024-05-23
Payer: MEDICARE

## 2024-05-23 ENCOUNTER — HOSPITAL ENCOUNTER (OUTPATIENT)
Dept: RADIOLOGY | Facility: HOSPITAL | Age: 79
Discharge: HOME | End: 2024-05-23
Attending: INTERNAL MEDICINE
Payer: MEDICARE

## 2024-05-23 VITALS
DIASTOLIC BLOOD PRESSURE: 78 MMHG | OXYGEN SATURATION: 98 % | WEIGHT: 178.38 LBS | SYSTOLIC BLOOD PRESSURE: 148 MMHG | HEIGHT: 70 IN | BODY MASS INDEX: 25.54 KG/M2 | HEART RATE: 64 BPM

## 2024-05-23 DIAGNOSIS — I10 ESSENTIAL HYPERTENSION: ICD-10-CM
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
## 2024-05-23 DIAGNOSIS — Z86.718 PERSONAL HISTORY OF DVT (DEEP VEIN THROMBOSIS): ICD-10-CM

## 2024-05-23 DIAGNOSIS — S29.9XXA INJURY OF CHEST WALL, INITIAL ENCOUNTER: ICD-10-CM

## 2024-05-23 DIAGNOSIS — S29.9XXA INJURY OF CHEST WALL, INITIAL ENCOUNTER: Primary | ICD-10-CM

## 2024-05-23 PROCEDURE — 71120 X-RAY EXAM BREASTBONE 2/>VWS: CPT | Mod: TC,HCNC

## 2024-05-23 PROCEDURE — 3288F FALL RISK ASSESSMENT DOCD: CPT | Mod: HCNC,CPTII,S$GLB, | Performed by: INTERNAL MEDICINE

## 2024-05-23 PROCEDURE — 99999 PR PBB SHADOW E&M-EST. PATIENT-LVL IV: CPT | Mod: PBBFAC,HCNC,, | Performed by: INTERNAL MEDICINE

## 2024-05-23 PROCEDURE — 1101F PT FALLS ASSESS-DOCD LE1/YR: CPT | Mod: HCNC,CPTII,S$GLB, | Performed by: INTERNAL MEDICINE

## 2024-05-23 PROCEDURE — 3077F SYST BP >= 140 MM HG: CPT | Mod: HCNC,CPTII,S$GLB, | Performed by: INTERNAL MEDICINE

## 2024-05-23 PROCEDURE — 1125F AMNT PAIN NOTED PAIN PRSNT: CPT | Mod: HCNC,CPTII,S$GLB, | Performed by: INTERNAL MEDICINE

## 2024-05-23 PROCEDURE — 71046 X-RAY EXAM CHEST 2 VIEWS: CPT | Mod: 26,HCNC,, | Performed by: RADIOLOGY

## 2024-05-23 PROCEDURE — 99214 OFFICE O/P EST MOD 30 MIN: CPT | Mod: HCNC,S$GLB,, | Performed by: INTERNAL MEDICINE

## 2024-05-23 PROCEDURE — 3078F DIAST BP <80 MM HG: CPT | Mod: HCNC,CPTII,S$GLB, | Performed by: INTERNAL MEDICINE

## 2024-05-23 PROCEDURE — 71046 X-RAY EXAM CHEST 2 VIEWS: CPT | Mod: TC,HCNC

## 2024-05-23 PROCEDURE — 71120 X-RAY EXAM BREASTBONE 2/>VWS: CPT | Mod: 26,HCNC,, | Performed by: RADIOLOGY

## 2024-05-23 PROCEDURE — 1159F MED LIST DOCD IN RCRD: CPT | Mod: HCNC,CPTII,S$GLB, | Performed by: INTERNAL MEDICINE

## 2024-05-23 RX ORDER — HYDROCODONE BITARTRATE AND ACETAMINOPHEN 5; 325 MG/1; MG/1
1 TABLET ORAL EVERY 6 HOURS PRN
Qty: 21 TABLET | Refills: 0 | Status: SHIPPED | OUTPATIENT
Start: 2024-05-23

## 2024-05-23 RX ORDER — HYDROCODONE BITARTRATE AND ACETAMINOPHEN 5; 325 MG/1; MG/1
1 TABLET ORAL EVERY 6 HOURS PRN
Qty: 21 TABLET | Refills: 0 | Status: SHIPPED | OUTPATIENT
Start: 2024-05-23 | End: 2024-05-23
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
## 2024-05-23 NOTE — TELEPHONE ENCOUNTER
I called and reviewed labs. Factor 5 leiden and prothrombin testing were negative.    Johann Kumar M.D.  Hematology/Oncology  [facility] [address], [address]  [place] [address]  Phone: [phone]  Fax: [phone]

## 2024-05-28 DIAGNOSIS — Z96.652 S/P TOTAL KNEE REPLACEMENT, LEFT: ICD-10-CM

## 2024-06-03 RX ORDER — CELECOXIB 200 MG/1
200 CAPSULE ORAL DAILY
Qty: 30 CAPSULE | Refills: 0 | Status: SHIPPED | OUTPATIENT
Start: 2024-06-03

## 2024-06-10 ENCOUNTER — PATIENT MESSAGE (OUTPATIENT)
Dept: INTERNAL MEDICINE | Facility: CLINIC | Age: 79
End: 2024-06-10
Payer: MEDICARE

## 2024-06-24 ENCOUNTER — HOSPITAL ENCOUNTER (OUTPATIENT)
Dept: CARDIOLOGY | Facility: HOSPITAL | Age: 79
Discharge: HOME | End: 2024-06-24
Attending: INTERNAL MEDICINE
Payer: MEDICARE

## 2024-06-24 DIAGNOSIS — I82.402 LEG DVT (DEEP VENOUS THROMBOEMBOLISM), ACUTE, LEFT: ICD-10-CM

## 2024-06-24 DIAGNOSIS — I89.0 LYMPHEDEMA OF BOTH LOWER EXTREMITIES: ICD-10-CM

## 2024-06-24 DIAGNOSIS — I83.893 VARICOSE VEINS OF BOTH LEGS WITH EDEMA: ICD-10-CM

## 2024-06-24 LAB
LEFT GREAT SAPHENOUS DISTAL THIGH DIA: 0.23 CM
LEFT GREAT SAPHENOUS JUNCTION DIA: 0.37 CM
LEFT GREAT SAPHENOUS KNEE DIA: 0.11 CM
LEFT GREAT SAPHENOUS MIDDLE THIGH DIA: 0.29 CM
LEFT GREAT SAPHENOUS PROXIMAL CALF DIA: 0.19 CM
LEFT SMALL SAPHENOUS KNEE DIA: 0.51 CM
LEFT SMALL SAPHENOUS KNEE REFLUX: 5434 MS
LEFT SMALL SAPHENOUS SPJ DIA: 0.2 CM
LEFT SMALL SAPHENOUS SPJ REFLUX: 4983 MS
RIGHT GREAT SAPHENOUS DISTAL THIGH DIA: 0.17 CM
RIGHT GREAT SAPHENOUS JUNCTION DIA: 0.63 CM
RIGHT GREAT SAPHENOUS KNEE DIA: 0.16 CM
RIGHT GREAT SAPHENOUS KNEE REFLUX: 1455 MS
RIGHT GREAT SAPHENOUS MIDDLE THIGH DIA: 0.2 CM
RIGHT GREAT SAPHENOUS PROXIMAL CALF DIA: 0.13 CM
RIGHT GREAT SAPHENOUS PROXIMAL CALF REFLUX: 1472 MS

## 2024-06-24 PROCEDURE — 93970 EXTREMITY STUDY: CPT | Mod: TC,HCNC,PO

## 2024-06-24 PROCEDURE — 93970 EXTREMITY STUDY: CPT | Mod: 26,HCNC,, | Performed by: INTERNAL MEDICINE

## 2024-07-03 ENCOUNTER — OFFICE VISIT (OUTPATIENT)
Dept: CARDIOLOGY | Facility: CLINIC | Age: 79
End: 2024-07-03
Payer: MEDICARE

## 2024-07-03 VITALS
BODY MASS INDEX: 25.62 KG/M2 | DIASTOLIC BLOOD PRESSURE: 78 MMHG | WEIGHT: 179 LBS | HEART RATE: 62 BPM | SYSTOLIC BLOOD PRESSURE: 134 MMHG | HEIGHT: 70 IN

## 2024-07-03 DIAGNOSIS — I83.893 VARICOSE VEINS OF BOTH LEGS WITH EDEMA: ICD-10-CM

## 2024-07-03 DIAGNOSIS — I82.402 LEG DVT (DEEP VENOUS THROMBOEMBOLISM), ACUTE, LEFT: ICD-10-CM

## 2024-07-03 DIAGNOSIS — I10 PRIMARY HYPERTENSION: Primary | ICD-10-CM

## 2024-07-03 DIAGNOSIS — Z86.718 PERSONAL HISTORY OF DVT (DEEP VEIN THROMBOSIS): ICD-10-CM

## 2024-07-03 DIAGNOSIS — R60.0 EDEMA OF BOTH LOWER EXTREMITIES: ICD-10-CM

## 2024-07-03 DIAGNOSIS — I89.0 LYMPHEDEMA OF BOTH LOWER EXTREMITIES: ICD-10-CM

## 2024-07-03 PROCEDURE — 1159F MED LIST DOCD IN RCRD: CPT | Mod: HCNC,CPTII,S$GLB, | Performed by: INTERNAL MEDICINE

## 2024-07-03 PROCEDURE — 99999 PR PBB SHADOW E&M-EST. PATIENT-LVL III: CPT | Mod: PBBFAC,HCNC,, | Performed by: INTERNAL MEDICINE

## 2024-07-03 PROCEDURE — 1101F PT FALLS ASSESS-DOCD LE1/YR: CPT | Mod: HCNC,CPTII,S$GLB, | Performed by: INTERNAL MEDICINE

## 2024-07-03 PROCEDURE — 1126F AMNT PAIN NOTED NONE PRSNT: CPT | Mod: HCNC,CPTII,S$GLB, | Performed by: INTERNAL MEDICINE

## 2024-07-03 PROCEDURE — 3288F FALL RISK ASSESSMENT DOCD: CPT | Mod: HCNC,CPTII,S$GLB, | Performed by: INTERNAL MEDICINE

## 2024-07-03 PROCEDURE — 3075F SYST BP GE 130 - 139MM HG: CPT | Mod: HCNC,CPTII,S$GLB, | Performed by: INTERNAL MEDICINE

## 2024-07-03 PROCEDURE — 3078F DIAST BP <80 MM HG: CPT | Mod: HCNC,CPTII,S$GLB, | Performed by: INTERNAL MEDICINE

## 2024-07-03 PROCEDURE — 99215 OFFICE O/P EST HI 40 MIN: CPT | Mod: HCNC,S$GLB,, | Performed by: INTERNAL MEDICINE

## 2024-07-03 NOTE — PATIENT INSTRUCTIONS
Assessment/Plan:  Aurora Castle is a 78 y.o. female with HTN, arthritis, s/p left TKA (1/8/2024), who presents for an initial appointment.    LLE DVT- Appears unprovoked, as left TKA was done on 1/8/2024 and DVT diagnosed 4/2/2024.  Abbreviated hypercoagulable workup performed by Heme/Onc is negative.  BLE Venous Reflux Study on 6/24/2024 revealed no evidence of right or left lower extremity DVT.  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  Continue prophylactic dose anticoagulation with Eliquis 2.5 mg bid lifelong.     2. HTN- Continue current medications.     3. BLE Edema- Due to mild lymphedema and possible venous insufficiency.  BLE Venous Reflux Study on 6/24/2024 revealed no evidence of right or left lower extremity DVT.  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  Recommend wearing graduated compression hose.  Limit sodium intake to 2000 mg daily.  Limit volume intake to 1.5 L daily.  Elevate legs when resting.    Follow up in 6 months

## 2024-07-03 NOTE — PROGRESS NOTES
"[facility]      Chief Complaint   Patient presents with    Leg DVT (deep venous thromboembolism), acute, left       Patient ID: Aurora Castle is a 78 y.o. female with HTN, arthritis, s/p left TKA (1/8/2024), who presents for a follow up appointment.  Pertinent history/events are as follows:    -Pt kindly referred by Dr. Patton for evaluation of LLE DVT.    -4/4/2024 telephone visit: Mrs. Castle reports progressive lower leg swelling 3 months after left TKA.  LLE Venous ultrasound on 4/2/2024 documents "somewhat sluggish flow seen within 1 of the paired left peroneal veins but remains patent.  This could be related to technical factors versus venous reflux; although, some component of nonocclusive thrombus not entirely excluded on the basis of this examination."  I have reviewed the images and recommend treating empirically for peroneal vein DVT.    Plan:  -Start Eliquis 10 mg bid with the 1st 7 days, then reduce to 5 mg bid thereafter.  -Pt scheduled to see me in clinic on 4/8/2024.  -Repeat LLE venous ultrasound in 3 months.  -Ultrasound results and plan discussed in detail with Mrs. Castle, who voiced understanding.    -At our initial clinic visit on 4/8/2024, Mrs. Castle reported pain and swelling the the left lower leg starting 10 days ago (3/29/2024).  LLE Venous ultrasound on 4/2/2024 documents "somewhat sluggish flow seen within 1 of the paired left peroneal veins but remains patent.  This could be related to technical factors versus venous reflux; although, some component of nonocclusive thrombus not entirely excluded on the basis of this examination."  I reviewed the images and pt was started on full dose anticoagulation with apixaban for empiric treatment of peroneal vein DVT.  She reports no bleeding issues.  Plan:   LLE DVT- Appears unprovoked, as left TKA was done on 1/8/2024 and DVT diagnosed 4/2/2024.  Refer to Heme/Onc for hypercoagulable workup and age-appropriate cancer " screening.  HTN- Continue current medications.   BLE Edema- Due to mild lymphedema and possible venous insufficiency.  Check BLE venous reflux study.  Will refer to lymphedema clinic after DVT has been appropriated treated.  Recommend wearing graduated compression hose.  Limit sodium intake to 2000 mg daily.  Limit volume intake to 1.5 L daily.  Elevate legs when resting.    HPI:  Mrs. Castle reports resolution of leg swelling.  Abbreviated hypercoagulable workup performed by Heme/Onc is negative.  BLE Venous Reflux Study on 2024 revealed no evidence of right or left lower extremity DVT.  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     Past Medical History:   Diagnosis Date    Arthritis     History of adenomatous polyp of colon     Hypertension     Joint pain     Pneumonia     during college     Primary osteoarthritis of right knee 2016    Tendonitis of ankle or foot     Trouble in sleeping     Urinary incontinence      Past Surgical History:   Procedure Laterality Date    ARTHROPLASTY, KNEE, TOTAL, USING COMPUTER-ASSISTED NAVIGATION Left 2024    Procedure: ARTHROPLASTY, KNEE, TOTAL, USING COMPUTER-ASSISTED NAVIGATION: GAGANDEEP: LEFT: NICHOLAS - TRIATHALON;  Surgeon: Zack aPtton III, MD;  Location: [facility];  Service: Orthopedics;  Laterality: Left;     SECTION          COLONOSCOPY N/A 2018    Procedure: COLONOSCOPY;  Surgeon: Jim Borges MD;  Location: [facility] ([address] [place] [address]);  Service: Endoscopy;  Laterality: N/A;    hemorrhiodectomy      tendon surgery L foot      TOTAL KNEE ARTHROPLASTY Right 2020    Procedure: ARTHROPLASTY, KNEE, TOTAL-DEPUY-SIGMA/DEPUY-TC3/MBT back up;  Surgeon: Zack Patton III, MD;  Location: [facility] OR;  Service: Orthopedics;  Laterality: Right;     Social History     Socioeconomic History    Marital status:      Spouse name: Salomon Mcdermott    Number of children: 1   Occupational History    Occupation: jeweler   Tobacco Use    Smoking status:  Never    Smokeless tobacco: Never   Substance and Sexual Activity    Alcohol use: Yes     Alcohol/week: 1.0 - 2.0 standard drink of alcohol     Types: 1 - 2 Glasses of wine per week    Drug use: No    Sexual activity: Yes     Partners: Male   Other Topics Concern    Are you pregnant or think you may be? No    Breast-feeding No   Social History Narrative    Biking, walking with dog, but limits after knee replacement.        Bikes 60 min most days.     Social Determinants of Health     Financial Resource Strain: Low Risk  (12/20/2023)    Overall Financial Resource Strain (CARDIA)     Difficulty of Paying Living Expenses: Not hard at all   Food Insecurity: No Food Insecurity (12/20/2023)    Hunger Vital Sign     Worried About Running Out of Food in the Last Year: Never true     Ran Out of Food in the Last Year: Never true   Transportation Needs: No Transportation Needs (12/20/2023)    PRAPARE - Transportation     Lack of Transportation (Medical): No     Lack of Transportation (Non-Medical): No   Physical Activity: Sufficiently Active (12/20/2023)    Exercise Vital Sign     Days of Exercise per Week: 6 days     Minutes of Exercise per Session: 40 min   Stress: No Stress Concern Present (12/20/2023)    [employer] - Occupational Stress Questionnaire     Feeling of Stress : Not at all   Housing Stability: Low Risk  (12/20/2023)    Housing Stability Vital Sign     Unable to Pay for Housing in the Last Year: No     Number of Places Lived in the Last Year: 1     Unstable Housing in the Last Year: No     Family History   Problem Relation Name Age of Onset    Stroke Mother Lucía Castle     Heart disease Mother Lucía Castle 78        MI    Parkinsonism Father Zack Castle     No Known Problems Daughter Joanie Mcdermott     Melanoma Neg Hx      Psoriasis Neg Hx      Lupus Neg Hx      Eczema Neg Hx      Acne Neg Hx         Review of patient's allergies indicates:   Allergen Reactions     Erythromycin Itching and Swelling     Eye swelling   Local eye ointment       Medication List with Changes/Refills   Current Medications    APIXABAN (ELIQUIS) 5 MG TAB    Take 2 tablets (10 mg total) by mouth 2 (two) times daily for the first 7 days, then reduce to 1 tablet (5 mg total) by mouth 2 (two) times daily thereafter.    ASCORBIC ACID, VITAMIN C, (VITAMIN C) 1000 MG TABLET    Take 1 tablet by mouth Daily. Hold medication one week prior to surgery    BIOTIN ORAL    Take 5,000 mcg by mouth once daily. Hold medication one week prior to surgery    CHOLECALCIFEROL, VITAMIN D3, (VITAMIN D3) 5,000 UNIT TAB    Take 1 tablet by mouth once daily.    DOXYLAMINE SUCCINATE (UNISOM, DOXYLAMINE, ORAL)    Take by mouth every evening. TAKES AT NIGHT    FISH OIL-OMEGA-3 FATTY ACIDS 300-1,000 MG CAPSULE    Take 2 g by mouth once daily. Hold medication one week prior to surgery    GLUCOSAMINE-CHONDROIT-VIT C--068-25-2.5 MG CAP    Take 1 capsule by mouth once daily. Hold medication one week prior to surgery    MELATONIN ORAL    Take by mouth every evening. TAKES AT NIGHT    MULTIVITAMIN WITH MINERALS (MULTIVITAMIN & MINERAL FORMULA) TAB    Take 1 tablet by mouth Daily. Hold medication one week prior to surgery    VALSARTAN (DIOVAN) 80 MG TABLET    Take 1 tablet (80 mg total) by mouth once daily.   Discontinued Medications    CELECOXIB (CELEBREX) 200 MG CAPSULE    TAKE 1 CAPSULE (200 MG TOTAL) BY MOUTH ONCE DAILY.    HYDROCODONE-ACETAMINOPHEN (NORCO) 5-325 MG PER TABLET    Take 1 tablet by mouth every 6 (six) hours as needed for Pain.    METHOCARBAMOL (ROBAXIN) 750 MG TAB    Take 1 tablet (750 mg total) by mouth 3 (three) times daily as needed (muscle spasm).       Review of Systems  Constitution: Denies chills, fever, and sweats.  HENT: Denies headaches or blurry vision.  Cardiovascular: Denies chest pain or irregular heart beat.  Respiratory: Denies cough or shortness of breath.  Gastrointestinal: Denies abdominal pain,  "nausea, or vomiting.  Musculoskeletal: Positive for leg swelling and LLE pain.  Neurological: Denies dizziness or focal weakness.  Psychiatric/Behavioral: Normal mental status.  Hematologic/Lymphatic: Denies bleeding problem or easy bruising/bleeding.  Skin: Denies rash or suspicious lesions    Physical Examination  /78   Pulse 62   Ht 5' 10" (1.778 m)   Wt 81.2 kg (179 lb 0.2 oz)   LMP  (LMP Unknown)   BMI 25.69 kg/m²     Constitutional: No acute distress, conversant  HEENT: Sclera anicteric, Pupils equal, round and reactive to light, extraocular motions intact, Oropharynx clear  Neck: No JVD, no carotid bruits  Cardiovascular: regular rate and rhythm, no murmur, rubs or gallops, normal S1/S2  Pulmonary: Clear to auscultation bilaterally  Abdominal: Abdomen soft, nontender, nondistended, positive bowel sounds  Extremities: BLE's with mild pitting edema, prominent varicose veins and changes consistent with mild lymphedema (L>R)   Pulses:  Carotid pulses are 2+ on the right side, and 2+ on the left side.  Radial pulses are 2+ on the right side, and 2+ on the left side.   Femoral pulses are 2+ on the right side, and 2+ on the left side.  Popliteal pulses are 2+ on the right side, and 2+ on the left side.   Dorsalis pedis pulses are 2+ on the right side, and 2+ on the left side.   Posterior tibial pulses are 2+ on the right side, and 2+ on the left side.    Skin: No ecchymosis, erythema, or ulcers  Psych: Alert and oriented x 3, appropriate affect  Neuro: CNII-XII intact, no focal deficits    Labs:  Most Recent Data  CBC:   Lab Results   Component Value Date    WBC 5.68 12/21/2023    HGB 13.2 12/21/2023    HCT 37.5 12/21/2023     12/21/2023    MCV 95 12/21/2023    RDW 13.0 12/21/2023     BMP:   Lab Results   Component Value Date     12/21/2023    K 4.3 12/21/2023     12/21/2023    CO2 24 12/21/2023    BUN 21 12/21/2023    CREATININE 0.7 12/21/2023    GLU 98 12/21/2023    CALCIUM 9.4 " "12/21/2023     LFTS;   Lab Results   Component Value Date    PROT 7.0 12/21/2023    ALBUMIN 4.1 12/21/2023    BILITOT 0.5 12/21/2023    AST 22 12/21/2023    ALKPHOS 63 12/21/2023    ALT 17 12/21/2023     COAGS:   Lab Results   Component Value Date    INR 1.0 12/21/2023     FLP:   Lab Results   Component Value Date    CHOL 140 11/28/2022    HDL 64 11/28/2022    LDLCALC 68.6 11/28/2022    TRIG 37 11/28/2022    CHOLHDL 45.7 11/28/2022     CARDIAC: No results found for: "TROPONINI", "CKTOTAL", "CKMB", "BNP"    Imaging:    BLE Venous Reflux Study 6/24/2024:    There is no evidence of a right lower extremity DVT.    The right greater saphenous vein has reflux.    There is no evidence of a left lower extremity DVT.    The left smaller saphenous vein has reflux.    LLE Venous Ultrasound 4/2/2024:  No evidence of deep venous thrombosis in the left lower extremity above the knee.  Somewhat sluggish flow seen within 1 of the paired left peroneal veins but remains patent.  This could be related to technical factors versus venous reflux; although, some component of nonocclusive thrombus not entirely excluded on the basis of this examination.  Clinical correlation advised.  Further evaluation/follow-up as warranted.    Assessment/Plan:  Aurora Castle is a 78 y.o. female with HTN, arthritis, s/p left TKA (1/8/2024), who presents for an initial appointment.    LLE DVT- Appears unprovoked, as left TKA was done on 1/8/2024 and DVT diagnosed 4/2/2024.  Abbreviated hypercoagulable workup performed by Heme/Onc is negative.  BLE Venous Reflux Study on 6/24/2024 revealed no evidence of right or left lower extremity DVT.  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  Continue prophylactic dose anticoagulation with Eliquis 2.5 mg bid lifelong.     2. HTN- Continue current medications.     3. BLE Edema- Due to mild lymphedema and possible venous insufficiency.  BLE Venous Reflux Study on 6/24/2024 revealed no evidence of right or left " lower extremity DVT.  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  Recommend wearing graduated compression hose.  Limit sodium intake to 2000 mg daily.  Limit volume intake to 1.5 L daily.  Elevate legs when resting.    Follow up in 6 months    Total duration of face to face visit time 30 minutes.  Total time spent counseling greater than fifty percent of total visit time.  Counseling included discussion regarding imaging findings, diagnosis, possibilities, treatment options, risks and benefits.  The patient had many questions regarding the options and long-term effects.    Trev Kelly MD, PhD  Interventional Cardiology

## 2024-07-09 ENCOUNTER — OFFICE VISIT (OUTPATIENT)
Dept: ORTHOPEDICS | Facility: CLINIC | Age: 79
End: 2024-07-09
Payer: MEDICARE

## 2024-07-09 ENCOUNTER — HOSPITAL ENCOUNTER (OUTPATIENT)
Dept: RADIOLOGY | Facility: HOSPITAL | Age: 79
Discharge: HOME | End: 2024-07-09
Attending: ORTHOPAEDIC SURGERY
Payer: MEDICARE

## 2024-07-09 VITALS — BODY MASS INDEX: 25.5 KG/M2 | HEIGHT: 70 IN | WEIGHT: 178.13 LBS

## 2024-07-09 DIAGNOSIS — Z96.652 STATUS POST LEFT KNEE REPLACEMENT: ICD-10-CM

## 2024-07-09 DIAGNOSIS — Z96.651 STATUS POST RIGHT KNEE REPLACEMENT: ICD-10-CM

## 2024-07-09 DIAGNOSIS — S80.01XA CONTUSION OF RIGHT KNEE, INITIAL ENCOUNTER: ICD-10-CM

## 2024-07-09 DIAGNOSIS — Z96.652 STATUS POST LEFT KNEE REPLACEMENT: Primary | ICD-10-CM

## 2024-07-09 DIAGNOSIS — S80.02XA CONTUSION OF LEFT KNEE, INITIAL ENCOUNTER: ICD-10-CM

## 2024-07-09 PROCEDURE — 73562 X-RAY EXAM OF KNEE 3: CPT | Mod: TC,50,HCNC

## 2024-07-09 PROCEDURE — 73562 X-RAY EXAM OF KNEE 3: CPT | Mod: 26,50,HCNC, | Performed by: RADIOLOGY

## 2024-07-09 PROCEDURE — 1159F MED LIST DOCD IN RCRD: CPT | Mod: HCNC,CPTII,S$GLB, | Performed by: ORTHOPAEDIC SURGERY

## 2024-07-09 PROCEDURE — 99999 PR PBB SHADOW E&M-EST. PATIENT-LVL III: CPT | Mod: PBBFAC,HCNC,, | Performed by: ORTHOPAEDIC SURGERY

## 2024-07-09 PROCEDURE — 1101F PT FALLS ASSESS-DOCD LE1/YR: CPT | Mod: HCNC,CPTII,S$GLB, | Performed by: ORTHOPAEDIC SURGERY

## 2024-07-09 PROCEDURE — 3288F FALL RISK ASSESSMENT DOCD: CPT | Mod: HCNC,CPTII,S$GLB, | Performed by: ORTHOPAEDIC SURGERY

## 2024-07-09 PROCEDURE — 99213 OFFICE O/P EST LOW 20 MIN: CPT | Mod: HCNC,S$GLB,, | Performed by: ORTHOPAEDIC SURGERY

## 2024-07-09 PROCEDURE — 1125F AMNT PAIN NOTED PAIN PRSNT: CPT | Mod: HCNC,CPTII,S$GLB, | Performed by: ORTHOPAEDIC SURGERY
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
## 2024-09-26 RX ORDER — VALSARTAN 80 MG/1
80 TABLET ORAL DAILY
Qty: 90 TABLET | Refills: 0 | Status: SHIPPED | OUTPATIENT
Start: 2024-09-26

## 2024-09-26 NOTE — TELEPHONE ENCOUNTER
Care Due:                  Date            Visit Type   Department     Provider  --------------------------------------------------------------------------------                                SAME DAY -                              ESTABLISHED   [facility] INTERNAL  Last Visit: 05-      PATIENT      MEDICINE       FRANCHESKA LARSOE  Next Visit: None Scheduled  None         None Found                                                            Last  Test          Frequency    Reason                     Performed    Due Date  --------------------------------------------------------------------------------    CMP.........  12 months..  valsartan................  12-   12-    [facility] Embedded Care Due Messages. Reference number: [id number].   9/26/2024 11:15:55 AM CDT

## 2024-09-26 NOTE — TELEPHONE ENCOUNTER
Provider Staff:  Action required for this patient    Requires labs      Please see care gap opportunities below in Care Due Message.    Thanks!  [facility]     Appointments      Date Provider   Last Visit   5/23/2024 Isabelle Vincent MD   Next Visit   Visit date not found Isabelle Vincent MD     Refill Decision Note   Aurora Esparzaurry  is requesting a refill authorization.  Brief Assessment and Rationale for Refill:  Approve     Medication Therapy Plan:         Comments:     Note composed:6:43 PM 09/26/2024

## 2024-10-16 ENCOUNTER — OFFICE VISIT (OUTPATIENT)
Dept: INTERNAL MEDICINE | Facility: CLINIC | Age: 79
End: 2024-10-16
Payer: MEDICARE

## 2024-10-16 VITALS
BODY MASS INDEX: 24.71 KG/M2 | WEIGHT: 172.63 LBS | SYSTOLIC BLOOD PRESSURE: 132 MMHG | HEART RATE: 61 BPM | HEIGHT: 70 IN | OXYGEN SATURATION: 95 % | DIASTOLIC BLOOD PRESSURE: 76 MMHG

## 2024-10-16 DIAGNOSIS — N81.4 UTEROVAGINAL PROLAPSE: ICD-10-CM

## 2024-10-16 DIAGNOSIS — Z86.718 PERSONAL HISTORY OF DVT (DEEP VEIN THROMBOSIS): ICD-10-CM

## 2024-10-16 DIAGNOSIS — I89.0 LYMPHEDEMA OF BOTH LOWER EXTREMITIES: ICD-10-CM

## 2024-10-16 DIAGNOSIS — Z00.00 ENCOUNTER FOR PREVENTIVE HEALTH EXAMINATION: ICD-10-CM

## 2024-10-16 DIAGNOSIS — G47.00 INSOMNIA, UNSPECIFIED TYPE: ICD-10-CM

## 2024-10-16 DIAGNOSIS — E55.9 MILD VITAMIN D DEFICIENCY: ICD-10-CM

## 2024-10-16 DIAGNOSIS — I10 PRIMARY HYPERTENSION: Primary | ICD-10-CM

## 2024-10-16 DIAGNOSIS — N39.46 URINARY INCONTINENCE, MIXED: ICD-10-CM

## 2024-10-16 DIAGNOSIS — I83.893 VARICOSE VEINS OF BOTH LEGS WITH EDEMA: ICD-10-CM

## 2024-10-16 DIAGNOSIS — Z96.652 HISTORY OF TOTAL LEFT KNEE REPLACEMENT: ICD-10-CM

## 2024-10-16 DIAGNOSIS — D69.2 SENILE PURPURA: ICD-10-CM

## 2024-10-16 DIAGNOSIS — Z86.0101 HISTORY OF ADENOMATOUS POLYP OF COLON: ICD-10-CM

## 2024-10-16 DIAGNOSIS — Z96.651 STATUS POST TOTAL RIGHT KNEE REPLACEMENT: ICD-10-CM

## 2024-10-16 DIAGNOSIS — R60.0 EDEMA OF BOTH LOWER EXTREMITIES: ICD-10-CM

## 2024-10-16 DIAGNOSIS — N81.11 CYSTOCELE, MIDLINE: ICD-10-CM

## 2024-10-16 DIAGNOSIS — N81.6 RECTOCELE, FEMALE: ICD-10-CM

## 2024-10-16 PROCEDURE — 99999 PR PBB SHADOW E&M-EST. PATIENT-LVL IV: CPT | Mod: PBBFAC,HCNC,,

## 2024-10-16 NOTE — PROGRESS NOTES
"  Aurora Castle presented for a follow-up Medicare AWV today. The following components were reviewed and updated:    Medical history  Family History  Social history  Allergies and Current Medications  Health Risk Assessment  Health Maintenance  Care Team    **See Completed Assessments for Annual Wellness visit with in the encounter summary    The following assessments were completed:  Depression Screening  Cognitive function Screening  Timed Get Up Test  Whisper Test      Opioid documentation:      Patient does not have a current opioid prescription.          Vitals:    10/16/24 1118   BP: 132/76   BP Location: Left arm   Patient Position: Sitting   Pulse: 61   SpO2: 95%   Weight: 78.3 kg (172 lb 9.9 oz)   Height: 5' 10" (1.778 m)     Body mass index is 24.77 kg/m².       Physical Exam  Constitutional:       General: She is not in acute distress.     Appearance: Normal appearance. She is normal weight. She is not ill-appearing or toxic-appearing.   HENT:      Head: Normocephalic and atraumatic.      Right Ear: Tympanic membrane and ear canal normal. There is no impacted cerumen.      Left Ear: Tympanic membrane and ear canal normal. There is no impacted cerumen.      Nose: Nose normal. No congestion or rhinorrhea.      Mouth/Throat:      Mouth: Mucous membranes are moist.      Pharynx: Oropharynx is clear. No oropharyngeal exudate or posterior oropharyngeal erythema.   Eyes:      Extraocular Movements: Extraocular movements intact.      Pupils: Pupils are equal, round, and reactive to light.   Cardiovascular:      Rate and Rhythm: Normal rate.      Pulses: Normal pulses.   Pulmonary:      Effort: Pulmonary effort is normal. No respiratory distress.      Breath sounds: Normal breath sounds. No wheezing.   Abdominal:      General: There is no distension.      Palpations: Abdomen is soft.      Tenderness: There is no abdominal tenderness. There is no guarding.   Musculoskeletal:      Cervical back: Normal range of " motion and neck supple. No rigidity or tenderness.      Right lower leg: No edema.      Left lower leg: No edema.   Lymphadenopathy:      Cervical: No cervical adenopathy.   Neurological:      Mental Status: She is alert and oriented to person, place, and time.           Diagnoses and health risks identified today and associated recommendations/orders:  1. Primary hypertension  Stable. Cont present mgmt. Work on low na diet and cont regular exercise. Fu with PCP    2. Varicose veins of both legs with edema  Stable. Cont present mgmt. Keep fu with PCP/Vascular as needed    3. Urinary incontinence, mixed  Stable. Doing well. Cont present mgmt. Fu with Urology as needed.    4. Uterovaginal prolapse  Stable. Cont present mgmt. Fu with UroGyn as needed    5. Cystocele, midline  Stable. Cont present mgmt. Fu with UroGyn as needed    6. Senile purpura  Stable. No bruising present today. Keep fu with PCP    7. Personal history of DVT (deep vein thrombosis)  Stable. Doing well. Fu with Vascular as needed.    8. Mild vitamin D deficiency  Stable. On Vit D supplementation. Fu with PCP    9. History of adenomatous polyp of colon  Stable. Last crc screen ~2018 with 5 year recall. FU with PCP to discuss need for further screens r/t age    10. Rectocele, female  Stable, Cont present mgmt. Fu Urogyn as needed.    11. Status post total right knee replacement 6/8/2020  Stable. Doing well. Cont present mgmt. Fu Ortho as needed.    12. Insomnia, unspecified type  Stable. Doing well. Cont present mgmt. Fu with PCP as needed.    13. Lymphedema of both lower extremities  Stable. Doing well. Cont present mgmt. Fu Vascular as needed.    14. Edema of both lower extremities  Stable. Improved. Cont present mgmt. Fu PCP/Vascular as needed.    15. History of total left knee replacement  Stable. Doing well. Cont present mgmt. Fu with Ortho as needed.    16. Encounter for preventive health examination  All age-related screenings and HM measures  discussed with patient. Vaccine card given showing eligibility for [id number] and rsv vaccines.      Provided Aurora with a 5-10 year written screening schedule and personal prevention plan. Recommendations were developed using the USPSTF age appropriate recommendations. Education, counseling, and referrals were provided as needed.  After Visit Summary printed and given to patient which includes a list of additional screenings\tests needed.    Follow up in about 1 year (around 10/16/2025) for Annual Medicare Wellness Visit.      Elizabeth Tilley NP  I offered to discuss advanced care planning, including how to pick a person who would make decisions for you if you were unable to make them for yourself, called a health care power of , and what kind of decisions you might make such as use of life sustaining treatments such as ventilators and tube feeding when faced with a life limiting illness recorded on a living will that they will need to know. (How you want to be cared for as you near the end of your natural life)     X Patient is interested in learning more about how to make advanced directives.  I provided them paperwork and offered to discuss this with them.

## 2024-10-16 NOTE — PATIENT INSTRUCTIONS
Counseling and Referral of Other Preventative  (Italic type indicates deductible and co-insurance are waived)    Patient Name: Aurora Castle  Today's Date: 10/16/2024    Health Maintenance       Date Due Completion Date    Mammogram 10/30/2024 10/30/2023    Lipid Panel 11/28/2027 11/28/2022    TETANUS VACCINE 11/02/2033 11/2/2023        No orders of the defined types were placed in this encounter.    The following information is provided to all patients.  This information is to help you find resources for any of the problems found today that may be affecting your health:                  Living healthy guide: www.[facility].[place].[facility]      Understanding Diabetes: www.diabetes.org      Eating healthy: www.cdc.gov/healthyweight      CDC home safety checklist: www.cdc.gov/steadi/patient.html      Agency on Aging: www.goea.[place].[facility]      Alcoholics anonymous (AA): www.aa.org      Physical Activity: www.jim.nih.gov/se6omin      Tobacco use: www.quitwithusla.org

## 2024-11-04 ENCOUNTER — HOSPITAL ENCOUNTER (OUTPATIENT)
Dept: RADIOLOGY | Facility: HOSPITAL | Age: 79
Discharge: HOME | End: 2024-11-04
Attending: INTERNAL MEDICINE
Payer: MEDICARE

## 2024-11-04 DIAGNOSIS — Z12.31 ENCOUNTER FOR SCREENING MAMMOGRAM FOR BREAST CANCER: ICD-10-CM

## 2024-11-04 PROCEDURE — 77067 SCR MAMMO BI INCL CAD: CPT | Mod: TC,HCNC

## 2024-11-04 PROCEDURE — 77067 SCR MAMMO BI INCL CAD: CPT | Mod: 26,HCNC,, | Performed by: RADIOLOGY

## 2024-11-04 PROCEDURE — 77063 BREAST TOMOSYNTHESIS BI: CPT | Mod: 26,HCNC,, | Performed by: RADIOLOGY

## 2024-11-14 ENCOUNTER — TELEPHONE (OUTPATIENT)
Dept: INTERNAL MEDICINE | Facility: CLINIC | Age: 79
End: 2024-11-14
Payer: MEDICARE

## 2024-11-14 DIAGNOSIS — R71.8 ABNORMAL RBC: ICD-10-CM

## 2024-11-14 DIAGNOSIS — I10 PRIMARY HYPERTENSION: Primary | ICD-10-CM

## 2024-11-14 NOTE — TELEPHONE ENCOUNTER
----- Message from Emma sent at 11/14/2024 10:45 AM CST -----  Contact: [phone] Patient  type: Lab    Caller is requesting to schedule their Lab appointment prior to an appointment.    Order is not listed in EPIC.  Please enter order and contact patient to schedule.    Preferred Date and Time of Labs: 01/07/2025 8 am    Date of Appointment:01/14/2025    Where would they like the lab performed?[facility]    Would the patient rather a call back or a response via [facility]? Pt portal      Best Call Back Number:[phone]    Additional Information: N/A

## 2024-11-22 NOTE — TELEPHONE ENCOUNTER
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 Yes  Light duty/ Modified duty available? Yes  If unable to return to the pre-injury job, is patient aware that patient may be required to return to work at a different job with same employer? Yes  Concerns about return to work: Causciously optimistic about return to work             related to the injury:? No  Currently employed elsewhere: No  Current Source of Income: Worker's Compensation    History:   Previous Employers (in the last 10 years):  1. Employer: [employer], Job Title: ; Length of time with employer: 8 months  2. Employer: [employer], Job Title: Instructional Coacj; Length of time with employer: 1.5 years  3. Employer: [employer], Job Title: Advanced Learning Director; Length of time with employer: 1.5 years  4. Employers: [employer], Job title: Clinical Faculty, Length of Time with the employer; 1 year  5. [employer], Job Title; , Length of Time with the employer; 17 years    Education History:   Highest grade completed: College Diploma    Transportation:  Valid 's License: Yes  Method: Own Car    TREATMENT   Patient feels her right shoulder condition is progressing.  She reports continued improvements in both range and strength.  Reviewed current light duty activities (with above restrictions), as well as regular duty tasks. Patient feels the WSRP has been of  great benefit in her rehabilitation plan. Workers' compensation education was provided.    PLAN     Return to Work Issues Discussed  Discussed Issues of Communication with Employer/ Insurer  Review of  Current Job Restrictions  Review of Job Demands  Workers Compensation Education    Goals:  1. Client to verbalize decreased return to work concerns-Progressing  2. Client will complete request for mileage reimbursement and submit to insurer-Progressing  3. Client will verbalize greater understanding of workers' compensation benefits and process of return to regular duties-Met    THERAPY DAILY BILLING   Untimed Procedures:  Vocational Rehabilitation    Total Treatment Time: 20 minutes

## 2025-01-07 ENCOUNTER — LAB VISIT (OUTPATIENT)
Dept: LAB | Facility: HOSPITAL | Age: 80
End: 2025-01-07
Attending: INTERNAL MEDICINE
Payer: MEDICARE

## 2025-01-07 DIAGNOSIS — I10 PRIMARY HYPERTENSION: ICD-10-CM

## 2025-01-07 DIAGNOSIS — R71.8 ABNORMAL RBC: ICD-10-CM

## 2025-01-07 LAB
ALBUMIN SERPL BCP-MCNC: 4.2 G/DL (ref 3.5–5.2)
ALP SERPL-CCNC: 64 U/L (ref 40–150)
ALT SERPL W/O P-5'-P-CCNC: 16 U/L (ref 10–44)
ANION GAP SERPL CALC-SCNC: 5 MMOL/L (ref 8–16)
AST SERPL-CCNC: 19 U/L (ref 10–40)
BILIRUB SERPL-MCNC: 0.6 MG/DL (ref 0.1–1)
BUN SERPL-MCNC: 22 MG/DL (ref 8–23)
CALCIUM SERPL-MCNC: 9.4 MG/DL (ref 8.7–10.5)
CHLORIDE SERPL-SCNC: 106 MMOL/L (ref 95–110)
CO2 SERPL-SCNC: 27 MMOL/L (ref 23–29)
CREAT SERPL-MCNC: 0.7 MG/DL (ref 0.5–1.4)
ERYTHROCYTE [DISTWIDTH] IN BLOOD BY AUTOMATED COUNT: 13 % (ref 11.5–14.5)
EST. GFR  (NO RACE VARIABLE): >60 ML/MIN/1.73 M^2
GLUCOSE SERPL-MCNC: 79 MG/DL (ref 70–110)
HCT VFR BLD AUTO: 40.7 % (ref 37–48.5)
HGB BLD-MCNC: 13.7 G/DL (ref 12–16)
MCH RBC QN AUTO: 33.8 PG (ref 27–31)
MCHC RBC AUTO-ENTMCNC: 33.7 G/DL (ref 32–36)
MCV RBC AUTO: 101 FL (ref 82–98)
PLATELET # BLD AUTO: 229 K/UL (ref 150–450)
PMV BLD AUTO: 9.9 FL (ref 9.2–12.9)
POTASSIUM SERPL-SCNC: 4.4 MMOL/L (ref 3.5–5.1)
PROT SERPL-MCNC: 7.3 G/DL (ref 6–8.4)
RBC # BLD AUTO: 4.05 M/UL (ref 4–5.4)
SODIUM SERPL-SCNC: 138 MMOL/L (ref 136–145)
WBC # BLD AUTO: 4.98 K/UL (ref 3.9–12.7)

## 2025-01-07 PROCEDURE — 80053 COMPREHEN METABOLIC PANEL: CPT | Mod: HCNC | Performed by: INTERNAL MEDICINE

## 2025-01-07 PROCEDURE — 36415 COLL VENOUS BLD VENIPUNCTURE: CPT | Mod: HCNC | Performed by: INTERNAL MEDICINE

## 2025-01-07 PROCEDURE — 85027 COMPLETE CBC AUTOMATED: CPT | Mod: HCNC | Performed by: INTERNAL MEDICINE

## 2025-01-16 ENCOUNTER — PATIENT MESSAGE (OUTPATIENT)
Dept: ORTHOPEDICS | Facility: CLINIC | Age: 80
End: 2025-01-16
Payer: MEDICARE

## 2025-01-16 DIAGNOSIS — Z96.652 STATUS POST LEFT KNEE REPLACEMENT: Primary | ICD-10-CM

## 2025-01-16 DIAGNOSIS — Z96.651 STATUS POST RIGHT KNEE REPLACEMENT: ICD-10-CM

## 2025-01-28 ENCOUNTER — OFFICE VISIT (OUTPATIENT)
Dept: INTERNAL MEDICINE | Facility: CLINIC | Age: 80
End: 2025-01-28
Payer: MEDICARE

## 2025-01-28 VITALS
HEART RATE: 71 BPM | DIASTOLIC BLOOD PRESSURE: 64 MMHG | SYSTOLIC BLOOD PRESSURE: 130 MMHG | HEIGHT: 70 IN | BODY MASS INDEX: 24.46 KG/M2 | OXYGEN SATURATION: 98 % | WEIGHT: 170.88 LBS

## 2025-01-28 DIAGNOSIS — I10 PRIMARY HYPERTENSION: ICD-10-CM

## 2025-01-28 DIAGNOSIS — Z00.00 ANNUAL PHYSICAL EXAM: Primary | ICD-10-CM

## 2025-01-28 DIAGNOSIS — D75.89 MACROCYTOSIS WITHOUT ANEMIA: ICD-10-CM

## 2025-01-28 PROCEDURE — 1159F MED LIST DOCD IN RCRD: CPT | Mod: CPTII,S$GLB,, | Performed by: INTERNAL MEDICINE

## 2025-01-28 PROCEDURE — 3288F FALL RISK ASSESSMENT DOCD: CPT | Mod: CPTII,S$GLB,, | Performed by: INTERNAL MEDICINE

## 2025-01-28 PROCEDURE — 3075F SYST BP GE 130 - 139MM HG: CPT | Mod: CPTII,S$GLB,, | Performed by: INTERNAL MEDICINE

## 2025-01-28 PROCEDURE — 1101F PT FALLS ASSESS-DOCD LE1/YR: CPT | Mod: CPTII,S$GLB,, | Performed by: INTERNAL MEDICINE

## 2025-01-28 PROCEDURE — 99397 PER PM REEVAL EST PAT 65+ YR: CPT | Mod: S$GLB,,, | Performed by: INTERNAL MEDICINE

## 2025-01-28 PROCEDURE — 3078F DIAST BP <80 MM HG: CPT | Mod: CPTII,S$GLB,, | Performed by: INTERNAL MEDICINE

## 2025-01-28 PROCEDURE — 1126F AMNT PAIN NOTED NONE PRSNT: CPT | Mod: CPTII,S$GLB,, | Performed by: INTERNAL MEDICINE

## 2025-01-28 PROCEDURE — 99999 PR PBB SHADOW E&M-EST. PATIENT-LVL III: CPT | Mod: PBBFAC,,, | Performed by: INTERNAL MEDICINE

## 2025-01-28 RX ORDER — VALSARTAN 80 MG/1
80 TABLET ORAL DAILY
Qty: 90 TABLET | Refills: 3 | Status: SHIPPED | OUTPATIENT
Start: 2025-01-28
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
## 2025-02-11 ENCOUNTER — OFFICE VISIT (OUTPATIENT)
Dept: ORTHOPEDICS | Facility: CLINIC | Age: 80
End: 2025-02-11
Payer: MEDICARE

## 2025-02-11 ENCOUNTER — HOSPITAL ENCOUNTER (OUTPATIENT)
Dept: RADIOLOGY | Facility: HOSPITAL | Age: 80
Discharge: HOME | End: 2025-02-11
Attending: ORTHOPAEDIC SURGERY
Payer: MEDICARE

## 2025-02-11 VITALS — WEIGHT: 173.38 LBS | HEIGHT: 70 IN | BODY MASS INDEX: 24.82 KG/M2

## 2025-02-11 DIAGNOSIS — Z96.652 STATUS POST LEFT KNEE REPLACEMENT: ICD-10-CM

## 2025-02-11 DIAGNOSIS — Z96.651 STATUS POST RIGHT KNEE REPLACEMENT: ICD-10-CM

## 2025-02-11 DIAGNOSIS — Z96.652 STATUS POST LEFT KNEE REPLACEMENT: Primary | ICD-10-CM

## 2025-02-11 PROCEDURE — 99999 PR PBB SHADOW E&M-EST. PATIENT-LVL III: CPT | Mod: PBBFAC,HCNC,, | Performed by: ORTHOPAEDIC SURGERY

## 2025-02-11 PROCEDURE — 73562 X-RAY EXAM OF KNEE 3: CPT | Mod: TC,50,HCNC

## 2025-02-11 PROCEDURE — 73562 X-RAY EXAM OF KNEE 3: CPT | Mod: 26,50,HCNC, | Performed by: RADIOLOGY

## 2025-02-11 RX ORDER — PREGABALIN 75 MG/1
75 CAPSULE ORAL 2 TIMES DAILY
Qty: 60 CAPSULE | Refills: 2 | Status: SHIPPED | OUTPATIENT
Start: 2025-02-11

## 2025-02-11 RX ORDER — METHYLPREDNISOLONE 4 MG/1
TABLET ORAL
Qty: 1 EACH | Refills: 0 | Status: SHIPPED | OUTPATIENT
Start: 2025-02-11
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
## 2025-05-06 RX ORDER — APIXABAN 5 MG/1
TABLET, FILM COATED ORAL
Qty: 60 TABLET | Refills: 0 | OUTPATIENT
Start: 2025-05-06

## 2025-05-08 NOTE — TELEPHONE ENCOUNTER
Refill Routing Note   Medication(s) are not appropriate for processing by [facility] for the following reason(s):        New or recently adjusted medication  Clarification of medication (Rx) details    [facility] action(s):  Defer        Medication Therapy Plan: Patient has been taking Eliquis 5 mg according to fill history found in Epic; deferred for assessment of proper dose provider wants patient on      Appointments  past 12m or future 3m with PCP    Date Provider   Last Visit   7/3/2024 Trev Kelly MD PhD   Next Visit   Visit date not found Trev Kelly MD PhD   ED visits in past 90 days: 0        Note composed:1:11 PM 05/08/2025

## 2025-06-12 ENCOUNTER — OFFICE VISIT (OUTPATIENT)
Dept: URGENT CARE | Facility: CLINIC | Age: 80
End: 2025-06-12
Payer: MEDICARE

## 2025-06-12 VITALS
BODY MASS INDEX: 24.84 KG/M2 | OXYGEN SATURATION: 97 % | DIASTOLIC BLOOD PRESSURE: 74 MMHG | HEIGHT: 70 IN | WEIGHT: 173.5 LBS | HEART RATE: 68 BPM | RESPIRATION RATE: 18 BRPM | TEMPERATURE: 98 F | SYSTOLIC BLOOD PRESSURE: 124 MMHG

## 2025-06-12 DIAGNOSIS — S61.011A LACERATION OF RIGHT THUMB WITHOUT FOREIGN BODY WITHOUT DAMAGE TO NAIL, INITIAL ENCOUNTER: Primary | ICD-10-CM

## 2025-06-12 PROCEDURE — 99213 OFFICE O/P EST LOW 20 MIN: CPT | Mod: S$GLB,,,

## 2025-06-12 RX ORDER — KETOROLAC TROMETHAMINE 10 MG/1
10 TABLET, FILM COATED ORAL EVERY 6 HOURS PRN
COMMUNITY
Start: 2025-02-27

## 2025-06-12 RX ORDER — MUPIROCIN 20 MG/G
OINTMENT TOPICAL 2 TIMES DAILY
Qty: 15 G | Refills: 0 | Status: SHIPPED | OUTPATIENT
Start: 2025-06-12

## 2025-06-12 RX ORDER — APIXABAN 5 MG/1
TABLET, FILM COATED ORAL
COMMUNITY
Start: 2025-05-06

## 2025-06-12 NOTE — PROGRESS NOTES
"Subjective:      Patient ID: Aurora Castle is a 79 y.o. female.    Vitals:  height is 5' 10" (1.778 m) and weight is 78.7 kg (173 lb 8 oz). Her oral temperature is 98.1 °F (36.7 °C). Her blood pressure is 124/74 and her pulse is 68. Her respiration is 18 and oxygen saturation is 97%.     Chief Complaint: Laceration    Pt presents today w/ rt thumb laceration ; incident occurred approx a hour ago.  Laceration is approx 1cm and 1/2 cm in depth of rt thumb ; currently bleeding. Pt reports she was cutting up cucumbers and accidentally cut the top of rt thumb. Pt denies foreign bodies, loss of ROM, loss of ability to bear full weight and loss of sensation. Pt is on rx eliquis . Pt  tdap is utd.     Laceration   The incident occurred less than 1 hour ago. Pain location: rt thumb. The laceration is 1 cm in size. The laceration mechanism was a clean knife. The pain is at a severity of 2/10. The pain is mild. The pain has been Constant since onset. She reports no foreign bodies present. Her tetanus status is UTD.       Constitution: Negative for fever.   Musculoskeletal:  Negative for joint pain, joint swelling and abnormal ROM of joint.   Skin:  Positive for laceration. Negative for erythema.      Objective:     Physical Exam   Constitutional: She is oriented to person, place, and time. She appears well-developed.   HENT:   Head: Normocephalic and atraumatic. Head is without abrasion, without contusion and without laceration.   Ears:   Right Ear: External ear normal.   Left Ear: External ear normal.   Nose: Nose normal.   Mouth/Throat: Oropharynx is clear and moist and mucous membranes are normal.   Eyes: Conjunctivae, EOM and lids are normal. Pupils are equal, round, and reactive to light.   Neck: Trachea normal and phonation normal. Neck supple.   Cardiovascular: Normal rate, regular rhythm and normal heart sounds.   Pulmonary/Chest: Effort normal and breath sounds normal. No stridor. No respiratory distress. "   Musculoskeletal: Normal range of motion.         General: Normal range of motion.        Hands:    Neurological: She is alert and oriented to person, place, and time.   Skin: Skin is warm, dry, intact and no rash. Capillary refill takes less than 2 seconds. No abrasion, No burn, No bruising, No erythema and No ecchymosis   Psychiatric: Her speech is normal and behavior is normal. Judgment and thought content normal.   Nursing note and vitals reviewed.      Assessment:     1. Laceration of right thumb without foreign body without damage to nail, initial encounter        Plan:       Laceration of right thumb without foreign body without damage to nail, initial encounter  -     mupirocin (BACTROBAN) 2 % ointment; Apply topically 2 (two) times daily.  Dispense: 15 g; Refill: 0      Irrigated with sterile water. Pressure dressing applied for 30 min. Bleeding lessened. I offered nerve block with cautery but patient declined. Applied mupirocin ointment, non-adherent dressing, gauze and coban. Advised to continue elevating. Keep covered for 2 days. Wound care instructions for afterwards. Consider holding eliquis tomorrow. RTC for s/s of infection or continued bleeding.         Discussed results/diagnosis/plan with patient in clinic. Strict precautions given to patient to monitor for worsening signs and symptoms. Advised to follow up with PCP or specialist.  Explained side effects of medications prescribed with patient and informed him/her to discontinue use if he/she has any side effects and to inform UC or PCP if this occurs. All questions answered. Strict ED verses clinic return precautions stressed and given in depth. Advised if symptoms worsens of fail to improve he/she should go to the Emergency Room. Discharge and follow-up instructions given verbally/printed with the patient who expressed understanding and willingness to comply with my recommendations. Patient voiced understanding and in agreement with current  treatment plan. Patient exits the exam room in no acute distress. Conversant and engaged during discharge discussion, verbalized understanding.

## 2025-06-13 NOTE — PATIENT INSTRUCTIONS
Keep covered for 2 days. Afterwards, cleanse wound once per day with gentle soap and water. Do not use peroxide to cleanse as this will disrupt the natural skin healing process. Cover during the day with bandaid to avoid introduction of bacteria. You can leave open to air at night.     Apply antibiotic ointment once per day x 7 days.     Monitor for signs of infection such as redness, warmth, increase in pain, purulent drainage, fevers, chills, body aches.     Return to the clinic if signs of infection occur, bleeding reoccurs.

## (undated) DEVICE — SUT MONOCRYL 4-0 PS-2

## (undated) DEVICE — TAPE SILK 3IN

## (undated) DEVICE — SEE MEDLINE ITEM 146298

## (undated) DEVICE — UNDERGLOVES BIOGEL PI SIZE 8.5

## (undated) DEVICE — ADHESIVE DERMABOND ADVANCED

## (undated) DEVICE — GAUZE SPONGE 4X4 12PLY

## (undated) DEVICE — DRESSING TRANS 4X4 TEGADERM

## (undated) DEVICE — SUT VICRYL PLUS 3-0 SH 18IN

## (undated) DEVICE — GOWN SMARTGOWN 3XL XLONG

## (undated) DEVICE — DRAPE INCISE IOBAN 2 23X33IN

## (undated) DEVICE — ALCOHOL 70% ANTISEPTIC ISO 4OZ

## (undated) DEVICE — SYR 50CC LL

## (undated) DEVICE — SUT MONOCYRL 4-0 PS2 UND

## (undated) DEVICE — CUP MEDICINE STERILE 2OZ

## (undated) DEVICE — SUT 2/0 36IN COATED VICRYL

## (undated) DEVICE — BLADE SURG #15 CARBON STEEL

## (undated) DEVICE — KIT TOTAL KNEE TKOFG

## (undated) DEVICE — KIT TOTAL KNEE TKOFG OMC

## (undated) DEVICE — MARKER SKIN RULER STERILE

## (undated) DEVICE — DRESSING AQUACEL AG RBBN 2X45

## (undated) DEVICE — SUT 1 36IN COATED VICRYL UN

## (undated) DEVICE — BLADE MAKO STANDARD

## (undated) DEVICE — BLADE DUAL CUT SAG 35X64X.89MM

## (undated) DEVICE — ELECTRODE REM PLYHSV RETURN 9

## (undated) DEVICE — TUBE SUCTION FRAZIER VENT 10FR

## (undated) DEVICE — SUT MCRYL PLUS 4-0 PS2 27IN

## (undated) DEVICE — PAD COLD THERAPY KNEE WRAP ON

## (undated) DEVICE — DRESSING TELFA N ADH 3X8

## (undated) DEVICE — Device

## (undated) DEVICE — GLOVE BIOGEL SKINSENSE PI 8.0

## (undated) DEVICE — DRAPE THREE-QTR REINF 53X77IN

## (undated) DEVICE — SOL IRR NACL .9% 3000ML

## (undated) DEVICE — UNDERGLOVES BIOGEL PI SIZE 7.5

## (undated) DEVICE — PIN BONE 3.2X110MM
Type: IMPLANTABLE DEVICE | Site: KNEE | Status: NON-FUNCTIONAL
Removed: 2024-01-08

## (undated) DEVICE — KIT DRAPE RIO ONE PIECE W/POCK

## (undated) DEVICE — COVER MAYO STND XL 30X57IN

## (undated) DEVICE — SUT QUILL PDO VIOL CP 45CM 2

## (undated) DEVICE — PUMP COLD THERAPY

## (undated) DEVICE — HOOD T-5 TEAR AWAY STERILE

## (undated) DEVICE — DRESSING AQUACEL RIBBON 2X45CM

## (undated) DEVICE — ALCOHOL 70% ISOP W/GREEN 16OZ

## (undated) DEVICE — SEE MEDLINE ITEM 157131

## (undated) DEVICE — PAD KNEE POLAR XL

## (undated) DEVICE — STOCKING ANTI-EMBOLIC LG REG

## (undated) DEVICE — SOL BETADINE 5%

## (undated) DEVICE — SYS REVOLUTION CEMENT MIXING

## (undated) DEVICE — PRESSURIZER HI VAC HIP KIT

## (undated) DEVICE — DRAPE SURG W/TWL 17 5/8X23

## (undated) DEVICE — SOL NACL IRR 1000ML BTL

## (undated) DEVICE — KIT VIZADISC KNEE TRACKING

## (undated) DEVICE — BLADE RECIP RIBBED

## (undated) DEVICE — SPONGE GAUZE 16PLY 4X4

## (undated) DEVICE — SOL NACL IRR 3000ML

## (undated) DEVICE — SYS KNEE EPAK PIN ATTUNE
Type: IMPLANTABLE DEVICE | Site: KNEE | Status: NON-FUNCTIONAL
Removed: 2020-06-08

## (undated) DEVICE — TOWEL OR XRAY WHITE 17X26IN

## (undated) DEVICE — NDL 18GA X1 1/2 REG BEVEL

## (undated) DEVICE — COVER LIGHT HANDLE 80/CA

## (undated) DEVICE — KIT IRR SUCTION HND PIECE

## (undated) DEVICE — BRUSH SCRUB HIBICLENS 4%

## (undated) DEVICE — GLOVE BIOGEL PI MICRO SZ 7

## (undated) DEVICE — MASK FLYTE HOOD PEEL AWAY

## (undated) DEVICE — COVER CAMERA OPERATING ROOM

## (undated) DEVICE — CATH SUCTION 10FR

## (undated) DEVICE — BLADE SAGITTAL 18 X 1.27 X 90M

## (undated) DEVICE — SEE MEDLINE ITEM 146271

## (undated) DEVICE — MARKER SKIN STND TIP BLUE BARR

## (undated) DEVICE — SEE MEDLINE ITEM 157144

## (undated) DEVICE — DRAPE T EXTRM SURG 121X128X90

## (undated) DEVICE — CONTAINER SPECIMEN STRL 4OZ

## (undated) DEVICE — DRAPE TOP 53X102IN

## (undated) DEVICE — KIT CHECKPOINT MAKO

## (undated) DEVICE — WRAP KNEE ACCU THERM GEL PACK

## (undated) DEVICE — PIN FIXATION BONE 140X3.2MM
Type: IMPLANTABLE DEVICE | Site: KNEE | Status: NON-FUNCTIONAL
Removed: 2024-01-08

## (undated) DEVICE — HOOD T7 W/ PEEL AWAY LENS